# Patient Record
Sex: FEMALE | Race: WHITE | NOT HISPANIC OR LATINO | Employment: UNEMPLOYED | URBAN - METROPOLITAN AREA
[De-identification: names, ages, dates, MRNs, and addresses within clinical notes are randomized per-mention and may not be internally consistent; named-entity substitution may affect disease eponyms.]

---

## 2018-02-09 ENCOUNTER — HOSPITAL ENCOUNTER (EMERGENCY)
Facility: HOSPITAL | Age: 28
Discharge: HOME | End: 2018-02-10
Attending: EMERGENCY MEDICINE | Admitting: EMERGENCY MEDICINE

## 2018-02-09 ENCOUNTER — APPOINTMENT (OUTPATIENT)
Dept: GENERAL RADIOLOGY | Facility: HOSPITAL | Age: 28
End: 2018-02-09

## 2018-02-09 DIAGNOSIS — R11.2 NAUSEA AND VOMITING IN ADULT: Primary | ICD-10-CM

## 2018-02-09 DIAGNOSIS — R53.83 FATIGUE, UNSPECIFIED TYPE: ICD-10-CM

## 2018-02-09 PROCEDURE — 80164 ASSAY DIPROPYLACETIC ACD TOT: CPT | Performed by: EMERGENCY MEDICINE

## 2018-02-09 PROCEDURE — 99284 EMERGENCY DEPT VISIT MOD MDM: CPT

## 2018-02-09 PROCEDURE — 85025 COMPLETE CBC W/AUTO DIFF WBC: CPT | Performed by: EMERGENCY MEDICINE

## 2018-02-09 PROCEDURE — 25010000002 ONDANSETRON PER 1 MG: Performed by: EMERGENCY MEDICINE

## 2018-02-09 PROCEDURE — 96374 THER/PROPH/DIAG INJ IV PUSH: CPT

## 2018-02-09 PROCEDURE — 71045 X-RAY EXAM CHEST 1 VIEW: CPT

## 2018-02-09 PROCEDURE — 96361 HYDRATE IV INFUSION ADD-ON: CPT

## 2018-02-09 PROCEDURE — 80307 DRUG TEST PRSMV CHEM ANLYZR: CPT | Performed by: EMERGENCY MEDICINE

## 2018-02-09 PROCEDURE — 80053 COMPREHEN METABOLIC PANEL: CPT | Performed by: EMERGENCY MEDICINE

## 2018-02-09 RX ORDER — NALTREXONE HYDROCHLORIDE 50 MG/1
50 TABLET, FILM COATED ORAL DAILY
COMMUNITY
End: 2018-05-01 | Stop reason: HOSPADM

## 2018-02-09 RX ORDER — ONDANSETRON 2 MG/ML
4 INJECTION INTRAMUSCULAR; INTRAVENOUS ONCE
Status: COMPLETED | OUTPATIENT
Start: 2018-02-09 | End: 2018-02-09

## 2018-02-09 RX ORDER — SODIUM CHLORIDE 0.9 % (FLUSH) 0.9 %
10 SYRINGE (ML) INJECTION AS NEEDED
Status: DISCONTINUED | OUTPATIENT
Start: 2018-02-09 | End: 2018-02-10 | Stop reason: HOSPADM

## 2018-02-09 RX ORDER — HALOPERIDOL 5 MG/1
5 TABLET ORAL DAILY PRN
COMMUNITY

## 2018-02-09 RX ORDER — MONTELUKAST SODIUM 10 MG/1
10 TABLET ORAL NIGHTLY
COMMUNITY

## 2018-02-09 RX ADMIN — SODIUM CHLORIDE 1000 ML: 9 INJECTION, SOLUTION INTRAVENOUS at 22:34

## 2018-02-09 RX ADMIN — ONDANSETRON 4 MG: 2 INJECTION INTRAMUSCULAR; INTRAVENOUS at 22:34

## 2018-02-10 VITALS
DIASTOLIC BLOOD PRESSURE: 69 MMHG | TEMPERATURE: 98 F | RESPIRATION RATE: 16 BRPM | HEART RATE: 84 BPM | SYSTOLIC BLOOD PRESSURE: 105 MMHG | HEIGHT: 61 IN | BODY MASS INDEX: 36.25 KG/M2 | OXYGEN SATURATION: 98 % | WEIGHT: 192 LBS

## 2018-02-10 LAB
ALBUMIN SERPL-MCNC: 3.6 G/DL (ref 3.2–4.8)
ALBUMIN/GLOB SERPL: 1.5 G/DL (ref 1.5–2.5)
ALP SERPL-CCNC: 92 U/L (ref 25–100)
ALT SERPL W P-5'-P-CCNC: 21 U/L (ref 7–40)
AMPHET+METHAMPHET UR QL: NEGATIVE
AMPHETAMINES UR QL: NEGATIVE
ANION GAP SERPL CALCULATED.3IONS-SCNC: 8 MMOL/L (ref 3–11)
AST SERPL-CCNC: 24 U/L (ref 0–33)
BARBITURATES UR QL SCN: NEGATIVE
BASOPHILS # BLD AUTO: 0.03 10*3/MM3 (ref 0–0.2)
BASOPHILS NFR BLD AUTO: 0.2 % (ref 0–1)
BENZODIAZ UR QL SCN: NEGATIVE
BILIRUB SERPL-MCNC: 0.2 MG/DL (ref 0.3–1.2)
BILIRUB UR QL STRIP: NEGATIVE
BUN BLD-MCNC: 12 MG/DL (ref 9–23)
BUN/CREAT SERPL: 13.3 (ref 7–25)
BUPRENORPHINE SERPL-MCNC: NEGATIVE NG/ML
CALCIUM SPEC-SCNC: 8.5 MG/DL (ref 8.7–10.4)
CANNABINOIDS SERPL QL: NEGATIVE
CHLORIDE SERPL-SCNC: 106 MMOL/L (ref 99–109)
CLARITY UR: CLEAR
CO2 SERPL-SCNC: 22 MMOL/L (ref 20–31)
COCAINE UR QL: NEGATIVE
COLOR UR: YELLOW
CREAT BLD-MCNC: 0.9 MG/DL (ref 0.6–1.3)
DEPRECATED RDW RBC AUTO: 50.5 FL (ref 37–54)
EOSINOPHIL # BLD AUTO: 0.05 10*3/MM3 (ref 0–0.3)
EOSINOPHIL NFR BLD AUTO: 0.4 % (ref 0–3)
ERYTHROCYTE [DISTWIDTH] IN BLOOD BY AUTOMATED COUNT: 17.5 % (ref 11.3–14.5)
ETHANOL BLD-MCNC: <10 MG/DL (ref 0–10)
GFR SERPL CREATININE-BSD FRML MDRD: 76 ML/MIN/1.73
GLOBULIN UR ELPH-MCNC: 2.4 GM/DL
GLUCOSE BLD-MCNC: 99 MG/DL (ref 70–100)
GLUCOSE UR STRIP-MCNC: NEGATIVE MG/DL
HCT VFR BLD AUTO: 33.4 % (ref 34.5–44)
HGB BLD-MCNC: 10.4 G/DL (ref 11.5–15.5)
HGB UR QL STRIP.AUTO: NEGATIVE
IMM GRANULOCYTES # BLD: 0.12 10*3/MM3 (ref 0–0.03)
IMM GRANULOCYTES NFR BLD: 1 % (ref 0–0.6)
KETONES UR QL STRIP: NEGATIVE
LEUKOCYTE ESTERASE UR QL STRIP.AUTO: NEGATIVE
LYMPHOCYTES # BLD AUTO: 2.08 10*3/MM3 (ref 0.6–4.8)
LYMPHOCYTES NFR BLD AUTO: 17.3 % (ref 24–44)
MCH RBC QN AUTO: 25 PG (ref 27–31)
MCHC RBC AUTO-ENTMCNC: 31.1 G/DL (ref 32–36)
MCV RBC AUTO: 80.3 FL (ref 80–99)
METHADONE UR QL SCN: NEGATIVE
MONOCYTES # BLD AUTO: 1.7 10*3/MM3 (ref 0–1)
MONOCYTES NFR BLD AUTO: 14.2 % (ref 0–12)
NEUTROPHILS # BLD AUTO: 8.03 10*3/MM3 (ref 1.5–8.3)
NEUTROPHILS NFR BLD AUTO: 66.9 % (ref 41–71)
NITRITE UR QL STRIP: NEGATIVE
OPIATES UR QL: NEGATIVE
OXYCODONE UR QL SCN: NEGATIVE
PCP UR QL SCN: NEGATIVE
PH UR STRIP.AUTO: 5.5 [PH] (ref 5–8)
PLATELET # BLD AUTO: 311 10*3/MM3 (ref 150–450)
PMV BLD AUTO: 9.8 FL (ref 6–12)
POTASSIUM BLD-SCNC: 4.3 MMOL/L (ref 3.5–5.5)
PROPOXYPH UR QL: NEGATIVE
PROT SERPL-MCNC: 6 G/DL (ref 5.7–8.2)
PROT UR QL STRIP: NEGATIVE
RBC # BLD AUTO: 4.16 10*6/MM3 (ref 3.89–5.14)
SODIUM BLD-SCNC: 136 MMOL/L (ref 132–146)
SP GR UR STRIP: 1.01 (ref 1–1.03)
TRICYCLICS UR QL SCN: POSITIVE
UROBILINOGEN UR QL STRIP: NORMAL
VALPROATE SERPL-MCNC: 41 MCG/ML (ref 50–150)
WBC NRBC COR # BLD: 12.01 10*3/MM3 (ref 3.5–10.8)

## 2018-02-10 PROCEDURE — 80306 DRUG TEST PRSMV INSTRMNT: CPT | Performed by: EMERGENCY MEDICINE

## 2018-02-10 PROCEDURE — 81003 URINALYSIS AUTO W/O SCOPE: CPT | Performed by: EMERGENCY MEDICINE

## 2018-02-10 NOTE — ED PROVIDER NOTES
Subjective   HPI Comments: Ms. Fartun Amin is a 27 y.o. female who presents to the ED with c/o vomiting. She reports that she was feeling good this morning and then went out to [facility] with a staff member and 3 other residents of her group home. However, while she was out she states that she developed vomiting as well as weakness, which she reports has been unchanged since onset. She also complains of a decreased appetite. Her caregiver called to tell us she started a new medication a week ago while at [facility] for a behavioral issue. No other acute complaints at this time.    Patient is a 27 y.o. female presenting with vomiting.   History provided by:  Patient and caregiver  Vomiting   The primary symptoms include vomiting. The illness began today. The onset was sudden. The problem has not changed since onset.  The vomiting began today.       Review of Systems   Constitutional: Positive for appetite change (Decreased appetite).   Gastrointestinal: Positive for vomiting.   Neurological: Positive for weakness.   All other systems reviewed and are negative.      Past Medical History:   Diagnosis Date   • ADHD (attention deficit hyperactivity disorder)    • Adjustment disorder    • Asthma    • GERD (gastroesophageal reflux disease)    • Hearing impairment    • Hypertension    • Intermittent explosive disorder    • Mood disorder    • Obesity    • PCOS (polycystic ovarian syndrome)    • Personality disorder    • PTSD (post-traumatic stress disorder)        Allergies   Allergen Reactions   • Penicillins    • Sulfa Antibiotics        History reviewed. No pertinent surgical history.    History reviewed. No pertinent family history.    Social History     Social History   • Marital status: Single     Spouse name: N/A   • Number of children: N/A   • Years of education: N/A     Social History Main Topics   • Smoking status: Never Smoker   • Smokeless tobacco: Never Used   • Alcohol use No   • Drug use: No   • Sexual  activity: Defer     Other Topics Concern   • None     Social History Narrative    Lives in a group home         Objective   Physical Exam   Constitutional: She is oriented to person, place, and time. She appears well-developed and well-nourished. No distress.   Patient is awake but somewhat somnolent appearing.   HENT:   Head: Normocephalic and atraumatic.   Nose: Nose normal.   Eyes: Conjunctivae are normal. No scleral icterus.   Neck: Normal range of motion. Neck supple.   Cardiovascular: Normal rate, regular rhythm and normal heart sounds.    No murmur heard.  Pulmonary/Chest: Effort normal and breath sounds normal. No respiratory distress.   Abdominal: Soft. There is no tenderness.   Musculoskeletal: Normal range of motion.   Patient has no significant edema or abnormalities to her lower extremities.    Neurological: She is alert and oriented to person, place, and time.   Skin: Skin is warm and dry. She is not diaphoretic. No erythema.   Patient has a wound in her antecubital fossa, which appears to be an old scar. There is no surrounding erythema or purulent drainage.   Psychiatric: She has a normal mood and affect. Her behavior is normal. Her speech is slurred.   Patient has slowed and slightly slurred speech.   Nursing note and vitals reviewed.      Procedures         ED Course  ED Course                     MDM  Number of Diagnoses or Management Options  Fatigue, unspecified type: new and requires workup  Nausea and vomiting in adult: new and requires workup  Diagnosis management comments: No acute abnormalities on labs or imaging.     Patient has stable vitals, and continues to appear well, nontoxic, in no distress.        Amount and/or Complexity of Data Reviewed  Clinical lab tests: ordered and reviewed  Tests in the radiology section of CPT®: ordered and reviewed  Decide to obtain previous medical records or to obtain history from someone other than the patient: yes  Obtain history from someone other  than the patient: yes  Review and summarize past medical records: yes  Independent visualization of images, tracings, or specimens: yes    Patient Progress  Patient progress: stable      Final diagnoses:   Nausea and vomiting in adult   Fatigue, unspecified type       Documentation assistance provided by cinthia Palafox.  Information recorded by the scribe was done at my direction and has been verified and validated by me.     Emerald Palafox  02/09/18 2227       Marquis Anne MD  02/10/18 012

## 2018-04-23 ENCOUNTER — APPOINTMENT (OUTPATIENT)
Dept: GENERAL RADIOLOGY | Facility: HOSPITAL | Age: 28
End: 2018-04-23

## 2018-04-23 ENCOUNTER — HOSPITAL ENCOUNTER (INPATIENT)
Facility: HOSPITAL | Age: 28
LOS: 8 days | End: 2018-05-01
Attending: EMERGENCY MEDICINE | Admitting: FAMILY MEDICINE

## 2018-04-23 ENCOUNTER — APPOINTMENT (OUTPATIENT)
Dept: CT IMAGING | Facility: HOSPITAL | Age: 28
End: 2018-04-23

## 2018-04-23 DIAGNOSIS — E86.0 DEHYDRATION: ICD-10-CM

## 2018-04-23 DIAGNOSIS — N39.0 ACUTE UTI: Primary | ICD-10-CM

## 2018-04-23 DIAGNOSIS — Z74.09 IMPAIRED MOBILITY AND ADLS: ICD-10-CM

## 2018-04-23 DIAGNOSIS — N17.9 AKI (ACUTE KIDNEY INJURY) (HCC): ICD-10-CM

## 2018-04-23 DIAGNOSIS — Z78.9 IMPAIRED MOBILITY AND ADLS: ICD-10-CM

## 2018-04-23 DIAGNOSIS — R41.82 ALTERED MENTAL STATUS, UNSPECIFIED ALTERED MENTAL STATUS TYPE: ICD-10-CM

## 2018-04-23 DIAGNOSIS — K52.9 ACUTE COLITIS: ICD-10-CM

## 2018-04-23 DIAGNOSIS — Z74.09 IMPAIRED FUNCTIONAL MOBILITY, BALANCE, GAIT, AND ENDURANCE: ICD-10-CM

## 2018-04-23 PROBLEM — E28.2 PCOS (POLYCYSTIC OVARIAN SYNDROME): Chronic | Status: ACTIVE | Noted: 2018-04-23

## 2018-04-23 PROBLEM — I10 HTN (HYPERTENSION): Chronic | Status: ACTIVE | Noted: 2018-04-23

## 2018-04-23 PROBLEM — E87.5 HYPERKALEMIA: Status: ACTIVE | Noted: 2018-04-23

## 2018-04-23 PROBLEM — R94.31 QT PROLONGATION: Status: ACTIVE | Noted: 2018-04-23

## 2018-04-23 PROBLEM — F43.10 PTSD (POST-TRAUMATIC STRESS DISORDER): Chronic | Status: ACTIVE | Noted: 2018-04-23

## 2018-04-23 PROBLEM — J45.909 ASTHMA: Chronic | Status: ACTIVE | Noted: 2018-04-23

## 2018-04-23 PROBLEM — T50.902A SUICIDE ATTEMPT BY DRUG INGESTION (HCC): Status: ACTIVE | Noted: 2018-04-23

## 2018-04-23 PROBLEM — K21.9 GERD (GASTROESOPHAGEAL REFLUX DISEASE): Chronic | Status: ACTIVE | Noted: 2018-04-23

## 2018-04-23 PROBLEM — F63.81 INTERMITTENT EXPLOSIVE DISORDER: Chronic | Status: ACTIVE | Noted: 2018-04-23

## 2018-04-23 PROBLEM — A41.9 SEPSIS (HCC): Status: ACTIVE | Noted: 2018-04-23

## 2018-04-23 PROBLEM — F60.9 PERSONALITY DISORDER (HCC): Chronic | Status: ACTIVE | Noted: 2018-04-23

## 2018-04-23 PROBLEM — F43.20 ADJUSTMENT DISORDER: Chronic | Status: ACTIVE | Noted: 2018-04-23

## 2018-04-23 PROBLEM — R89.2 ABNORMAL DRUG SCREEN: Status: ACTIVE | Noted: 2018-04-23

## 2018-04-23 LAB
ALBUMIN SERPL-MCNC: 3.5 G/DL (ref 3.2–4.8)
ALBUMIN/GLOB SERPL: 1.3 G/DL (ref 1.5–2.5)
ALP SERPL-CCNC: 69 U/L (ref 25–100)
ALT SERPL W P-5'-P-CCNC: 123 U/L (ref 7–40)
AMORPH URATE CRY URNS QL MICRO: ABNORMAL /HPF
AMPHET+METHAMPHET UR QL: NEGATIVE
AMPHETAMINES UR QL: NEGATIVE
ANION GAP SERPL CALCULATED.3IONS-SCNC: 10 MMOL/L (ref 3–11)
APAP SERPL-MCNC: <10 MCG/ML (ref 0–30)
AST SERPL-CCNC: 116 U/L (ref 0–33)
B-HCG UR QL: NEGATIVE
BACTERIA UR QL AUTO: ABNORMAL /HPF
BARBITURATES UR QL SCN: NEGATIVE
BASOPHILS # BLD AUTO: 0.03 10*3/MM3 (ref 0–0.2)
BASOPHILS NFR BLD AUTO: 0.2 % (ref 0–1)
BENZODIAZ UR QL SCN: NEGATIVE
BILIRUB SERPL-MCNC: 0.1 MG/DL (ref 0.3–1.2)
BILIRUB UR QL STRIP: NEGATIVE
BNP SERPL-MCNC: 14 PG/ML (ref 0–100)
BUN BLD-MCNC: 26 MG/DL (ref 9–23)
BUN/CREAT SERPL: 7.6 (ref 7–25)
BUPRENORPHINE SERPL-MCNC: NEGATIVE NG/ML
CALCIUM SPEC-SCNC: 8.9 MG/DL (ref 8.7–10.4)
CANNABINOIDS SERPL QL: NEGATIVE
CHLORIDE SERPL-SCNC: 103 MMOL/L (ref 99–109)
CLARITY UR: ABNORMAL
CO2 SERPL-SCNC: 23 MMOL/L (ref 20–31)
COCAINE UR QL: NEGATIVE
COLOR UR: ABNORMAL
CREAT BLD-MCNC: 3.4 MG/DL (ref 0.6–1.3)
CREAT UR-MCNC: 190 MG/DL
D-LACTATE SERPL-SCNC: 1.3 MMOL/L (ref 0.5–2)
D-LACTATE SERPL-SCNC: 1.4 MMOL/L (ref 0.5–2)
DEPRECATED RDW RBC AUTO: 51.2 FL (ref 37–54)
EOSINOPHIL # BLD AUTO: 0.03 10*3/MM3 (ref 0–0.3)
EOSINOPHIL NFR BLD AUTO: 0.2 % (ref 0–3)
ERYTHROCYTE [DISTWIDTH] IN BLOOD BY AUTOMATED COUNT: 16.7 % (ref 11.3–14.5)
ETHANOL BLD-MCNC: <10 MG/DL (ref 0–10)
GFR SERPL CREATININE-BSD FRML MDRD: 16 ML/MIN/1.73
GLOBULIN UR ELPH-MCNC: 2.6 GM/DL
GLUCOSE BLD-MCNC: 107 MG/DL (ref 70–100)
GLUCOSE BLDC GLUCOMTR-MCNC: 111 MG/DL (ref 70–130)
GLUCOSE UR STRIP-MCNC: NEGATIVE MG/DL
HCT VFR BLD AUTO: 35 % (ref 34.5–44)
HGB BLD-MCNC: 11.4 G/DL (ref 11.5–15.5)
HGB UR QL STRIP.AUTO: ABNORMAL
HOLD SPECIMEN: NORMAL
HOLD SPECIMEN: NORMAL
HYALINE CASTS UR QL AUTO: ABNORMAL /LPF
IMM GRANULOCYTES # BLD: 0.31 10*3/MM3 (ref 0–0.03)
IMM GRANULOCYTES NFR BLD: 2.5 % (ref 0–0.6)
KETONES UR QL STRIP: ABNORMAL
LEUKOCYTE ESTERASE UR QL STRIP.AUTO: ABNORMAL
LIPASE SERPL-CCNC: 24 U/L (ref 6–51)
LYMPHOCYTES # BLD AUTO: 1.91 10*3/MM3 (ref 0.6–4.8)
LYMPHOCYTES NFR BLD AUTO: 15.3 % (ref 24–44)
MAGNESIUM SERPL-MCNC: 2.4 MG/DL (ref 1.3–2.7)
MCH RBC QN AUTO: 27.1 PG (ref 27–31)
MCHC RBC AUTO-ENTMCNC: 32.6 G/DL (ref 32–36)
MCV RBC AUTO: 83.3 FL (ref 80–99)
METHADONE UR QL SCN: NEGATIVE
MONOCYTES # BLD AUTO: 2.39 10*3/MM3 (ref 0–1)
MONOCYTES NFR BLD AUTO: 19.1 % (ref 0–12)
NEUTROPHILS # BLD AUTO: 7.84 10*3/MM3 (ref 1.5–8.3)
NEUTROPHILS NFR BLD AUTO: 62.7 % (ref 41–71)
NITRITE UR QL STRIP: NEGATIVE
OPIATES UR QL: POSITIVE
OXYCODONE UR QL SCN: NEGATIVE
PCP UR QL SCN: NEGATIVE
PH UR STRIP.AUTO: <=5 [PH] (ref 5–8)
PLATELET # BLD AUTO: 284 10*3/MM3 (ref 150–450)
PMV BLD AUTO: 9.6 FL (ref 6–12)
POTASSIUM BLD-SCNC: 6 MMOL/L (ref 3.5–5.5)
PROCALCITONIN SERPL-MCNC: 0.81 NG/ML
PROPOXYPH UR QL: POSITIVE
PROT SERPL-MCNC: 6.1 G/DL (ref 5.7–8.2)
PROT UR QL STRIP: ABNORMAL
RBC # BLD AUTO: 4.2 10*6/MM3 (ref 3.89–5.14)
RBC # UR: ABNORMAL /HPF
REF LAB TEST METHOD: ABNORMAL
SODIUM BLD-SCNC: 136 MMOL/L (ref 132–146)
SODIUM UR-SCNC: 51 MMOL/L (ref 30–90)
SP GR UR STRIP: 1.02 (ref 1–1.03)
SQUAMOUS #/AREA URNS HPF: ABNORMAL /HPF
TRICYCLICS UR QL SCN: POSITIVE
TROPONIN I SERPL-MCNC: 0.01 NG/ML (ref 0–0.07)
UROBILINOGEN UR QL STRIP: ABNORMAL
VALPROATE SERPL-MCNC: 89 MCG/ML (ref 50–150)
WBC NRBC COR # BLD: 12.51 10*3/MM3 (ref 3.5–10.8)
WBC UR QL AUTO: ABNORMAL /HPF
WHOLE BLOOD HOLD SPECIMEN: NORMAL
WHOLE BLOOD HOLD SPECIMEN: NORMAL

## 2018-04-23 PROCEDURE — 82962 GLUCOSE BLOOD TEST: CPT

## 2018-04-23 PROCEDURE — 81025 URINE PREGNANCY TEST: CPT | Performed by: EMERGENCY MEDICINE

## 2018-04-23 PROCEDURE — G0378 HOSPITAL OBSERVATION PER HR: HCPCS

## 2018-04-23 PROCEDURE — 83690 ASSAY OF LIPASE: CPT | Performed by: NURSE PRACTITIONER

## 2018-04-23 PROCEDURE — 82550 ASSAY OF CK (CPK): CPT | Performed by: NURSE PRACTITIONER

## 2018-04-23 PROCEDURE — 83880 ASSAY OF NATRIURETIC PEPTIDE: CPT | Performed by: NURSE PRACTITIONER

## 2018-04-23 PROCEDURE — 83735 ASSAY OF MAGNESIUM: CPT | Performed by: EMERGENCY MEDICINE

## 2018-04-23 PROCEDURE — 84484 ASSAY OF TROPONIN QUANT: CPT | Performed by: NURSE PRACTITIONER

## 2018-04-23 PROCEDURE — 84145 PROCALCITONIN (PCT): CPT | Performed by: EMERGENCY MEDICINE

## 2018-04-23 PROCEDURE — 80307 DRUG TEST PRSMV CHEM ANLYZR: CPT | Performed by: EMERGENCY MEDICINE

## 2018-04-23 PROCEDURE — 80307 DRUG TEST PRSMV CHEM ANLYZR: CPT | Performed by: NURSE PRACTITIONER

## 2018-04-23 PROCEDURE — P9612 CATHETERIZE FOR URINE SPEC: HCPCS

## 2018-04-23 PROCEDURE — 71045 X-RAY EXAM CHEST 1 VIEW: CPT

## 2018-04-23 PROCEDURE — 87086 URINE CULTURE/COLONY COUNT: CPT | Performed by: HOSPITALIST

## 2018-04-23 PROCEDURE — 93005 ELECTROCARDIOGRAM TRACING: CPT

## 2018-04-23 PROCEDURE — 83605 ASSAY OF LACTIC ACID: CPT | Performed by: NURSE PRACTITIONER

## 2018-04-23 PROCEDURE — 74176 CT ABD & PELVIS W/O CONTRAST: CPT

## 2018-04-23 PROCEDURE — 87186 SC STD MICRODIL/AGAR DIL: CPT | Performed by: HOSPITALIST

## 2018-04-23 PROCEDURE — 83605 ASSAY OF LACTIC ACID: CPT | Performed by: EMERGENCY MEDICINE

## 2018-04-23 PROCEDURE — 87077 CULTURE AEROBIC IDENTIFY: CPT | Performed by: HOSPITALIST

## 2018-04-23 PROCEDURE — 99285 EMERGENCY DEPT VISIT HI MDM: CPT

## 2018-04-23 PROCEDURE — 93005 ELECTROCARDIOGRAM TRACING: CPT | Performed by: EMERGENCY MEDICINE

## 2018-04-23 PROCEDURE — 87040 BLOOD CULTURE FOR BACTERIA: CPT | Performed by: EMERGENCY MEDICINE

## 2018-04-23 PROCEDURE — 80164 ASSAY DIPROPYLACETIC ACD TOT: CPT | Performed by: EMERGENCY MEDICINE

## 2018-04-23 PROCEDURE — 84300 ASSAY OF URINE SODIUM: CPT | Performed by: NURSE PRACTITIONER

## 2018-04-23 PROCEDURE — 99223 1ST HOSP IP/OBS HIGH 75: CPT | Performed by: FAMILY MEDICINE

## 2018-04-23 PROCEDURE — 81001 URINALYSIS AUTO W/SCOPE: CPT | Performed by: EMERGENCY MEDICINE

## 2018-04-23 PROCEDURE — 25010000002 MEROPENEM: Performed by: EMERGENCY MEDICINE

## 2018-04-23 PROCEDURE — 84484 ASSAY OF TROPONIN QUANT: CPT

## 2018-04-23 PROCEDURE — 85025 COMPLETE CBC W/AUTO DIFF WBC: CPT | Performed by: EMERGENCY MEDICINE

## 2018-04-23 PROCEDURE — 82570 ASSAY OF URINE CREATININE: CPT | Performed by: NURSE PRACTITIONER

## 2018-04-23 PROCEDURE — 70450 CT HEAD/BRAIN W/O DYE: CPT

## 2018-04-23 PROCEDURE — 25010000002 VANCOMYCIN: Performed by: EMERGENCY MEDICINE

## 2018-04-23 PROCEDURE — 80306 DRUG TEST PRSMV INSTRMNT: CPT | Performed by: EMERGENCY MEDICINE

## 2018-04-23 PROCEDURE — 82140 ASSAY OF AMMONIA: CPT | Performed by: NURSE PRACTITIONER

## 2018-04-23 PROCEDURE — 80053 COMPREHEN METABOLIC PANEL: CPT | Performed by: EMERGENCY MEDICINE

## 2018-04-23 RX ORDER — DIVALPROEX SODIUM 500 MG/1
500 TABLET, EXTENDED RELEASE ORAL DAILY
Status: DISCONTINUED | OUTPATIENT
Start: 2018-04-24 | End: 2018-05-01 | Stop reason: HOSPADM

## 2018-04-23 RX ORDER — BISACODYL 10 MG
10 SUPPOSITORY, RECTAL RECTAL ONCE
Status: DISCONTINUED | OUTPATIENT
Start: 2018-04-23 | End: 2018-04-24

## 2018-04-23 RX ORDER — ATORVASTATIN CALCIUM 20 MG/1
20 TABLET, FILM COATED ORAL NIGHTLY
COMMUNITY

## 2018-04-23 RX ORDER — MONTELUKAST SODIUM 10 MG/1
10 TABLET ORAL NIGHTLY
Status: DISCONTINUED | OUTPATIENT
Start: 2018-04-23 | End: 2018-05-01 | Stop reason: HOSPADM

## 2018-04-23 RX ORDER — FOLIC ACID 1 MG/1
1 TABLET ORAL DAILY
Status: DISCONTINUED | OUTPATIENT
Start: 2018-04-24 | End: 2018-05-01 | Stop reason: HOSPADM

## 2018-04-23 RX ORDER — ESCITALOPRAM OXALATE 10 MG/1
10 TABLET ORAL EVERY MORNING
COMMUNITY

## 2018-04-23 RX ORDER — ESCITALOPRAM OXALATE 10 MG/1
10 TABLET ORAL EVERY MORNING
Status: DISCONTINUED | OUTPATIENT
Start: 2018-04-24 | End: 2018-05-01 | Stop reason: HOSPADM

## 2018-04-23 RX ORDER — DIVALPROEX SODIUM 500 MG/1
1000 TABLET, EXTENDED RELEASE ORAL NIGHTLY
Status: DISCONTINUED | OUTPATIENT
Start: 2018-04-24 | End: 2018-05-01 | Stop reason: HOSPADM

## 2018-04-23 RX ORDER — FLUTICASONE PROPIONATE 50 MCG
2 SPRAY, SUSPENSION (ML) NASAL DAILY
Status: DISCONTINUED | OUTPATIENT
Start: 2018-04-24 | End: 2018-05-01 | Stop reason: HOSPADM

## 2018-04-23 RX ORDER — ACETAMINOPHEN 325 MG/1
650 TABLET ORAL EVERY 6 HOURS PRN
Status: DISCONTINUED | OUTPATIENT
Start: 2018-04-23 | End: 2018-05-01 | Stop reason: HOSPADM

## 2018-04-23 RX ORDER — BISACODYL 10 MG
10 SUPPOSITORY, RECTAL RECTAL DAILY PRN
Status: DISCONTINUED | OUTPATIENT
Start: 2018-04-23 | End: 2018-05-01 | Stop reason: HOSPADM

## 2018-04-23 RX ORDER — RANITIDINE 150 MG/1
150 TABLET ORAL NIGHTLY
COMMUNITY

## 2018-04-23 RX ORDER — BENZTROPINE MESYLATE 1 MG/1
2 TABLET ORAL EVERY 12 HOURS SCHEDULED
Status: DISCONTINUED | OUTPATIENT
Start: 2018-04-23 | End: 2018-05-01 | Stop reason: HOSPADM

## 2018-04-23 RX ORDER — DOCUSATE SODIUM 100 MG/1
100 CAPSULE, LIQUID FILLED ORAL 2 TIMES DAILY
Status: DISCONTINUED | OUTPATIENT
Start: 2018-04-24 | End: 2018-04-30

## 2018-04-23 RX ORDER — CETIRIZINE HYDROCHLORIDE 10 MG/1
10 TABLET ORAL NIGHTLY
Status: DISCONTINUED | OUTPATIENT
Start: 2018-04-23 | End: 2018-05-01 | Stop reason: HOSPADM

## 2018-04-23 RX ORDER — FAMOTIDINE 20 MG/1
20 TABLET, FILM COATED ORAL NIGHTLY
Status: DISCONTINUED | OUTPATIENT
Start: 2018-04-23 | End: 2018-05-01 | Stop reason: HOSPADM

## 2018-04-23 RX ORDER — ACETAMINOPHEN 650 MG/1
650 SUPPOSITORY RECTAL 2 TIMES DAILY PRN
Status: DISCONTINUED | OUTPATIENT
Start: 2018-04-23 | End: 2018-05-01 | Stop reason: HOSPADM

## 2018-04-23 RX ORDER — PANTOPRAZOLE SODIUM 40 MG/1
40 TABLET, DELAYED RELEASE ORAL DAILY
Status: DISCONTINUED | OUTPATIENT
Start: 2018-04-24 | End: 2018-05-01 | Stop reason: HOSPADM

## 2018-04-23 RX ORDER — SODIUM CHLORIDE 0.9 % (FLUSH) 0.9 %
10 SYRINGE (ML) INJECTION AS NEEDED
Status: DISCONTINUED | OUTPATIENT
Start: 2018-04-23 | End: 2018-05-01 | Stop reason: HOSPADM

## 2018-04-23 RX ORDER — HEPARIN SODIUM 5000 [USP'U]/ML
5000 INJECTION, SOLUTION INTRAVENOUS; SUBCUTANEOUS EVERY 12 HOURS SCHEDULED
Status: DISCONTINUED | OUTPATIENT
Start: 2018-04-24 | End: 2018-05-01 | Stop reason: HOSPADM

## 2018-04-23 RX ORDER — SODIUM CHLORIDE 9 MG/ML
125 INJECTION, SOLUTION INTRAVENOUS CONTINUOUS
Status: DISCONTINUED | OUTPATIENT
Start: 2018-04-23 | End: 2018-04-27

## 2018-04-23 RX ORDER — SODIUM CHLORIDE 0.9 % (FLUSH) 0.9 %
1-10 SYRINGE (ML) INJECTION AS NEEDED
Status: DISCONTINUED | OUTPATIENT
Start: 2018-04-23 | End: 2018-05-01 | Stop reason: HOSPADM

## 2018-04-23 RX ORDER — PROMETHAZINE HYDROCHLORIDE 12.5 MG/1
12.5 SUPPOSITORY RECTAL EVERY 6 HOURS PRN
Status: DISCONTINUED | OUTPATIENT
Start: 2018-04-23 | End: 2018-05-01 | Stop reason: HOSPADM

## 2018-04-23 RX ADMIN — CETIRIZINE HYDROCHLORIDE 10 MG: 10 TABLET, FILM COATED ORAL at 22:36

## 2018-04-23 RX ADMIN — SODIUM CHLORIDE 100 ML/HR: 9 INJECTION, SOLUTION INTRAVENOUS at 22:37

## 2018-04-23 RX ADMIN — FAMOTIDINE 20 MG: 20 TABLET, FILM COATED ORAL at 22:36

## 2018-04-23 RX ADMIN — BENZTROPINE MESYLATE 2 MG: 1 TABLET ORAL at 22:36

## 2018-04-23 RX ADMIN — MEROPENEM 1 G: 1 INJECTION, POWDER, FOR SOLUTION INTRAVENOUS at 18:55

## 2018-04-23 RX ADMIN — MONTELUKAST SODIUM 10 MG: 10 TABLET, FILM COATED ORAL at 22:36

## 2018-04-23 RX ADMIN — VANCOMYCIN HYDROCHLORIDE 1750 MG: 10 INJECTION, POWDER, LYOPHILIZED, FOR SOLUTION INTRAVENOUS at 19:37

## 2018-04-23 RX ADMIN — SODIUM CHLORIDE 2000 ML: 9 INJECTION, SOLUTION INTRAVENOUS at 17:41

## 2018-04-23 RX ADMIN — ACETAMINOPHEN 650 MG: 325 TABLET ORAL at 23:08

## 2018-04-24 ENCOUNTER — APPOINTMENT (OUTPATIENT)
Dept: NEUROLOGY | Facility: HOSPITAL | Age: 28
End: 2018-04-24
Attending: FAMILY MEDICINE

## 2018-04-24 ENCOUNTER — APPOINTMENT (OUTPATIENT)
Dept: CARDIOLOGY | Facility: HOSPITAL | Age: 28
End: 2018-04-24
Attending: FAMILY MEDICINE

## 2018-04-24 PROBLEM — M62.82 NON-TRAUMATIC RHABDOMYOLYSIS: Status: ACTIVE | Noted: 2018-04-24

## 2018-04-24 LAB
ALBUMIN SERPL-MCNC: 2.9 G/DL (ref 3.2–4.8)
ALBUMIN/GLOB SERPL: 1.3 G/DL (ref 1.5–2.5)
ALP SERPL-CCNC: 52 U/L (ref 25–100)
ALT SERPL W P-5'-P-CCNC: 137 U/L (ref 7–40)
AMMONIA BLD-SCNC: <10 UMOL/L (ref 19–60)
ANION GAP SERPL CALCULATED.3IONS-SCNC: 6 MMOL/L (ref 3–11)
ANION GAP SERPL CALCULATED.3IONS-SCNC: 9 MMOL/L (ref 3–11)
APTT PPP: 25.6 SECONDS (ref 24–31)
ARTICHOKE IGE QN: 38 MG/DL (ref 0–130)
AST SERPL-CCNC: 244 U/L (ref 0–33)
BASOPHILS # BLD AUTO: 0.02 10*3/MM3 (ref 0–0.2)
BASOPHILS NFR BLD AUTO: 0.2 % (ref 0–1)
BH CV ECHO MEAS - BSA(HAYCOCK): 2.3 M^2
BH CV ECHO MEAS - BSA: 2.1 M^2
BH CV ECHO MEAS - BZI_BMI: 42.9 KILOGRAMS/M^2
BH CV ECHO MEAS - BZI_METRIC_HEIGHT: 160 CM
BH CV ECHO MEAS - BZI_METRIC_WEIGHT: 109.8 KG
BH CV LOWER VASCULAR LEFT COMMON FEMORAL AUGMENT: NORMAL
BH CV LOWER VASCULAR LEFT COMMON FEMORAL COMPRESS: NORMAL
BH CV LOWER VASCULAR LEFT COMMON FEMORAL PHASIC: NORMAL
BH CV LOWER VASCULAR LEFT COMMON FEMORAL SPONT: NORMAL
BH CV LOWER VASCULAR RIGHT COMMON FEMORAL AUGMENT: NORMAL
BH CV LOWER VASCULAR RIGHT COMMON FEMORAL COMPRESS: NORMAL
BH CV LOWER VASCULAR RIGHT COMMON FEMORAL PHASIC: NORMAL
BH CV LOWER VASCULAR RIGHT COMMON FEMORAL SPONT: NORMAL
BH CV LOWER VASCULAR RIGHT DISTAL FEMORAL COMPRESS: NORMAL
BH CV LOWER VASCULAR RIGHT GASTRONEMIUS COMPRESS: NORMAL
BH CV LOWER VASCULAR RIGHT GREATER SAPH AK COMPRESS: NORMAL
BH CV LOWER VASCULAR RIGHT GREATER SAPH BK COMPRESS: NORMAL
BH CV LOWER VASCULAR RIGHT MID FEMORAL AUGMENT: NORMAL
BH CV LOWER VASCULAR RIGHT MID FEMORAL COMPRESS: NORMAL
BH CV LOWER VASCULAR RIGHT MID FEMORAL PHASIC: NORMAL
BH CV LOWER VASCULAR RIGHT MID FEMORAL SPONT: NORMAL
BH CV LOWER VASCULAR RIGHT PERONEAL COMPRESS: NORMAL
BH CV LOWER VASCULAR RIGHT POPLITEAL AUGMENT: NORMAL
BH CV LOWER VASCULAR RIGHT POPLITEAL COMPRESS: NORMAL
BH CV LOWER VASCULAR RIGHT POPLITEAL PHASIC: NORMAL
BH CV LOWER VASCULAR RIGHT POPLITEAL SPONT: NORMAL
BH CV LOWER VASCULAR RIGHT POSTERIOR TIBIAL COMPRESS: NORMAL
BH CV LOWER VASCULAR RIGHT PROXIMAL FEMORAL COMPRESS: NORMAL
BH CV LOWER VASCULAR RIGHT SAPHENOFEMORAL JUNCTION AUGMENT: NORMAL
BH CV LOWER VASCULAR RIGHT SAPHENOFEMORAL JUNCTION COMPRESS: NORMAL
BH CV LOWER VASCULAR RIGHT SAPHENOFEMORAL JUNCTION PHASIC: NORMAL
BH CV LOWER VASCULAR RIGHT SAPHENOFEMORAL JUNCTION SPONT: NORMAL
BILIRUB SERPL-MCNC: 0.2 MG/DL (ref 0.3–1.2)
BUN BLD-MCNC: 20 MG/DL (ref 9–23)
BUN BLD-MCNC: 24 MG/DL (ref 9–23)
BUN/CREAT SERPL: 10 (ref 7–25)
BUN/CREAT SERPL: 12.5 (ref 7–25)
CALCIUM SPEC-SCNC: 7.6 MG/DL (ref 8.7–10.4)
CALCIUM SPEC-SCNC: 7.8 MG/DL (ref 8.7–10.4)
CHLORIDE SERPL-SCNC: 108 MMOL/L (ref 99–109)
CHLORIDE SERPL-SCNC: 109 MMOL/L (ref 99–109)
CHOLEST SERPL-MCNC: 118 MG/DL (ref 0–200)
CK SERPL-CCNC: 6559 U/L (ref 26–174)
CK SERPL-CCNC: >7800 U/L (ref 26–174)
CO2 SERPL-SCNC: 21 MMOL/L (ref 20–31)
CO2 SERPL-SCNC: 22 MMOL/L (ref 20–31)
CREAT BLD-MCNC: 1.6 MG/DL (ref 0.6–1.3)
CREAT BLD-MCNC: 2.4 MG/DL (ref 0.6–1.3)
DEPRECATED RDW RBC AUTO: 52.6 FL (ref 37–54)
EOSINOPHIL # BLD AUTO: 0.07 10*3/MM3 (ref 0–0.3)
EOSINOPHIL NFR BLD AUTO: 0.7 % (ref 0–3)
ERYTHROCYTE [DISTWIDTH] IN BLOOD BY AUTOMATED COUNT: 17 % (ref 11.3–14.5)
GFR SERPL CREATININE-BSD FRML MDRD: 24 ML/MIN/1.73
GFR SERPL CREATININE-BSD FRML MDRD: 39 ML/MIN/1.73
GLOBULIN UR ELPH-MCNC: 2.3 GM/DL
GLUCOSE BLD-MCNC: 79 MG/DL (ref 70–100)
GLUCOSE BLD-MCNC: 92 MG/DL (ref 70–100)
HAV IGM SERPL QL IA: NORMAL
HBA1C MFR BLD: 5.5 % (ref 4.8–5.6)
HBV CORE IGM SERPL QL IA: NORMAL
HBV SURFACE AG SERPL QL IA: NORMAL
HCT VFR BLD AUTO: 28 % (ref 34.5–44)
HCT VFR BLD AUTO: 31.1 % (ref 34.5–44)
HCV AB SER DONR QL: NORMAL
HDLC SERPL-MCNC: 37 MG/DL (ref 40–60)
HGB BLD-MCNC: 9 G/DL (ref 11.5–15.5)
HGB BLD-MCNC: 9.5 G/DL (ref 11.5–15.5)
HIV1+2 AB SER QL: NORMAL
IMM GRANULOCYTES # BLD: 0.21 10*3/MM3 (ref 0–0.03)
IMM GRANULOCYTES NFR BLD: 2.1 % (ref 0–0.6)
INR PPP: 1.04 (ref 0.91–1.09)
LYMPHOCYTES # BLD AUTO: 1.73 10*3/MM3 (ref 0.6–4.8)
LYMPHOCYTES NFR BLD AUTO: 16.9 % (ref 24–44)
MCH RBC QN AUTO: 27 PG (ref 27–31)
MCHC RBC AUTO-ENTMCNC: 32.1 G/DL (ref 32–36)
MCV RBC AUTO: 84.1 FL (ref 80–99)
MONOCYTES # BLD AUTO: 2.03 10*3/MM3 (ref 0–1)
MONOCYTES NFR BLD AUTO: 19.8 % (ref 0–12)
NEUTROPHILS # BLD AUTO: 6.39 10*3/MM3 (ref 1.5–8.3)
NEUTROPHILS NFR BLD AUTO: 62.4 % (ref 41–71)
PLATELET # BLD AUTO: 259 10*3/MM3 (ref 150–450)
PMV BLD AUTO: 9.9 FL (ref 6–12)
POTASSIUM BLD-SCNC: 4.5 MMOL/L (ref 3.5–5.5)
POTASSIUM BLD-SCNC: 5.1 MMOL/L (ref 3.5–5.5)
PROT SERPL-MCNC: 5.2 G/DL (ref 5.7–8.2)
PROTHROMBIN TIME: 10.9 SECONDS (ref 9.6–11.5)
RBC # BLD AUTO: 3.33 10*6/MM3 (ref 3.89–5.14)
SODIUM BLD-SCNC: 136 MMOL/L (ref 132–146)
SODIUM BLD-SCNC: 139 MMOL/L (ref 132–146)
TRIGL SERPL-MCNC: 322 MG/DL (ref 0–150)
TROPONIN I SERPL-MCNC: <0.006 NG/ML
TSH SERPL DL<=0.05 MIU/L-ACNC: 2 MIU/ML (ref 0.35–5.35)
VANCOMYCIN TROUGH SERPL-MCNC: 13.9 MCG/ML (ref 10–20)
WBC NRBC COR # BLD: 10.24 10*3/MM3 (ref 3.5–10.8)

## 2018-04-24 PROCEDURE — 25010000002 MORPHINE SULFATE (PF) 2 MG/ML SOLUTION: Performed by: NURSE PRACTITIONER

## 2018-04-24 PROCEDURE — 80053 COMPREHEN METABOLIC PANEL: CPT | Performed by: NURSE PRACTITIONER

## 2018-04-24 PROCEDURE — 84443 ASSAY THYROID STIM HORMONE: CPT | Performed by: NURSE PRACTITIONER

## 2018-04-24 PROCEDURE — 86592 SYPHILIS TEST NON-TREP QUAL: CPT | Performed by: NURSE PRACTITIONER

## 2018-04-24 PROCEDURE — 80061 LIPID PANEL: CPT | Performed by: NURSE PRACTITIONER

## 2018-04-24 PROCEDURE — 25010000002 VANCOMYCIN: Performed by: EMERGENCY MEDICINE

## 2018-04-24 PROCEDURE — 85610 PROTHROMBIN TIME: CPT | Performed by: NURSE PRACTITIONER

## 2018-04-24 PROCEDURE — 99233 SBSQ HOSP IP/OBS HIGH 50: CPT | Performed by: HOSPITALIST

## 2018-04-24 PROCEDURE — 85014 HEMATOCRIT: CPT | Performed by: NURSE PRACTITIONER

## 2018-04-24 PROCEDURE — 93971 EXTREMITY STUDY: CPT | Performed by: INTERNAL MEDICINE

## 2018-04-24 PROCEDURE — 25010000002 VANCOMYCIN

## 2018-04-24 PROCEDURE — 80202 ASSAY OF VANCOMYCIN: CPT | Performed by: FAMILY MEDICINE

## 2018-04-24 PROCEDURE — 85025 COMPLETE CBC W/AUTO DIFF WBC: CPT | Performed by: NURSE PRACTITIONER

## 2018-04-24 PROCEDURE — 85730 THROMBOPLASTIN TIME PARTIAL: CPT | Performed by: NURSE PRACTITIONER

## 2018-04-24 PROCEDURE — 25010000002 MEROPENEM: Performed by: NURSE PRACTITIONER

## 2018-04-24 PROCEDURE — 80074 ACUTE HEPATITIS PANEL: CPT | Performed by: NURSE PRACTITIONER

## 2018-04-24 PROCEDURE — 25010000002 HEPARIN (PORCINE) PER 1000 UNITS: Performed by: NURSE PRACTITIONER

## 2018-04-24 PROCEDURE — 85018 HEMOGLOBIN: CPT | Performed by: NURSE PRACTITIONER

## 2018-04-24 PROCEDURE — 82550 ASSAY OF CK (CPK): CPT | Performed by: FAMILY MEDICINE

## 2018-04-24 PROCEDURE — 95819 EEG AWAKE AND ASLEEP: CPT

## 2018-04-24 PROCEDURE — 93971 EXTREMITY STUDY: CPT

## 2018-04-24 PROCEDURE — 25010000002 VANCOMYCIN PER 500 MG

## 2018-04-24 PROCEDURE — 83036 HEMOGLOBIN GLYCOSYLATED A1C: CPT | Performed by: NURSE PRACTITIONER

## 2018-04-24 PROCEDURE — 25010000002 LORAZEPAM PER 2 MG: Performed by: FAMILY MEDICINE

## 2018-04-24 PROCEDURE — G0432 EIA HIV-1/HIV-2 SCREEN: HCPCS | Performed by: NURSE PRACTITIONER

## 2018-04-24 RX ORDER — LORAZEPAM 2 MG/ML
1 INJECTION INTRAMUSCULAR EVERY 6 HOURS PRN
Status: DISCONTINUED | OUTPATIENT
Start: 2018-04-24 | End: 2018-04-30

## 2018-04-24 RX ORDER — MORPHINE SULFATE 2 MG/ML
2 INJECTION, SOLUTION INTRAMUSCULAR; INTRAVENOUS
Status: DISCONTINUED | OUTPATIENT
Start: 2018-04-24 | End: 2018-04-30

## 2018-04-24 RX ADMIN — SODIUM CHLORIDE 100 ML/HR: 9 INJECTION, SOLUTION INTRAVENOUS at 13:22

## 2018-04-24 RX ADMIN — LORAZEPAM 1 MG: 2 INJECTION INTRAMUSCULAR; INTRAVENOUS at 01:39

## 2018-04-24 RX ADMIN — AZTREONAM 1 G: 1 INJECTION, POWDER, LYOPHILIZED, FOR SOLUTION INTRAMUSCULAR; INTRAVENOUS at 21:57

## 2018-04-24 RX ADMIN — MORPHINE SULFATE 2 MG: 10 INJECTION INTRAVENOUS at 01:06

## 2018-04-24 RX ADMIN — AZTREONAM 1 G: 1 INJECTION, POWDER, LYOPHILIZED, FOR SOLUTION INTRAMUSCULAR; INTRAVENOUS at 14:14

## 2018-04-24 RX ADMIN — HEPARIN SODIUM 5000 UNITS: 5000 INJECTION, SOLUTION INTRAVENOUS; SUBCUTANEOUS at 20:12

## 2018-04-24 RX ADMIN — VANCOMYCIN HYDROCHLORIDE 1750 MG: 10 INJECTION, POWDER, LYOPHILIZED, FOR SOLUTION INTRAVENOUS at 15:21

## 2018-04-24 RX ADMIN — ESCITALOPRAM OXALATE 10 MG: 20 TABLET, FILM COATED ORAL at 09:03

## 2018-04-24 RX ADMIN — MEROPENEM 500 MG: 500 INJECTION, POWDER, FOR SOLUTION INTRAVENOUS at 06:04

## 2018-04-24 RX ADMIN — METRONIDAZOLE 500 MG: 500 INJECTION, SOLUTION INTRAVENOUS at 17:37

## 2018-04-24 RX ADMIN — DIVALPROEX SODIUM 500 MG: 500 TABLET, FILM COATED, EXTENDED RELEASE ORAL at 09:02

## 2018-04-24 RX ADMIN — DIVALPROEX SODIUM 1000 MG: 500 TABLET, FILM COATED, EXTENDED RELEASE ORAL at 20:10

## 2018-04-24 RX ADMIN — METRONIDAZOLE 500 MG: 500 INJECTION, SOLUTION INTRAVENOUS at 08:58

## 2018-04-24 RX ADMIN — CETIRIZINE HYDROCHLORIDE 10 MG: 10 TABLET, FILM COATED ORAL at 20:10

## 2018-04-24 RX ADMIN — FAMOTIDINE 20 MG: 20 TABLET, FILM COATED ORAL at 20:12

## 2018-04-24 RX ADMIN — LORAZEPAM 1 MG: 2 INJECTION INTRAMUSCULAR; INTRAVENOUS at 20:24

## 2018-04-24 RX ADMIN — BENZTROPINE MESYLATE 2 MG: 1 TABLET ORAL at 20:10

## 2018-04-24 RX ADMIN — MORPHINE SULFATE 2 MG: 10 INJECTION INTRAVENOUS at 16:10

## 2018-04-24 RX ADMIN — DOCUSATE SODIUM 100 MG: 100 CAPSULE, LIQUID FILLED ORAL at 20:13

## 2018-04-24 RX ADMIN — MONTELUKAST SODIUM 10 MG: 10 TABLET, FILM COATED ORAL at 20:10

## 2018-04-24 RX ADMIN — POLYETHYLENE GLYCOL (3350) 17 G: 17 POWDER, FOR SOLUTION ORAL at 20:13

## 2018-04-24 RX ADMIN — BENZTROPINE MESYLATE 2 MG: 1 TABLET ORAL at 09:02

## 2018-04-24 RX ADMIN — PANTOPRAZOLE SODIUM 40 MG: 40 TABLET, DELAYED RELEASE ORAL at 06:04

## 2018-04-24 RX ADMIN — METRONIDAZOLE 500 MG: 500 INJECTION, SOLUTION INTRAVENOUS at 00:42

## 2018-04-24 RX ADMIN — VANCOMYCIN HYDROCHLORIDE 750 MG: 750 INJECTION, SOLUTION INTRAVENOUS at 16:42

## 2018-04-24 RX ADMIN — MORPHINE SULFATE 2 MG: 10 INJECTION INTRAVENOUS at 19:44

## 2018-04-24 RX ADMIN — PSYLLIUM HUSK 1 PACKET: 3.5 GRANULE ORAL at 20:13

## 2018-04-24 RX ADMIN — VANCOMYCIN HYDROCHLORIDE 1250 MG: 10 INJECTION, POWDER, LYOPHILIZED, FOR SOLUTION INTRAVENOUS at 09:47

## 2018-04-25 ENCOUNTER — APPOINTMENT (OUTPATIENT)
Dept: GENERAL RADIOLOGY | Facility: HOSPITAL | Age: 28
End: 2018-04-25

## 2018-04-25 LAB
ALBUMIN SERPL-MCNC: 3 G/DL (ref 3.2–4.8)
ALBUMIN/GLOB SERPL: 1.3 G/DL (ref 1.5–2.5)
ALP SERPL-CCNC: 74 U/L (ref 25–100)
ALT SERPL W P-5'-P-CCNC: 204 U/L (ref 7–40)
ANION GAP SERPL CALCULATED.3IONS-SCNC: 6 MMOL/L (ref 3–11)
AST SERPL-CCNC: 430 U/L (ref 0–33)
BASOPHILS # BLD AUTO: 0.06 10*3/MM3 (ref 0–0.2)
BASOPHILS NFR BLD AUTO: 0.5 % (ref 0–1)
BILIRUB SERPL-MCNC: 0.2 MG/DL (ref 0.3–1.2)
BUN BLD-MCNC: 9 MG/DL (ref 9–23)
BUN/CREAT SERPL: 15 (ref 7–25)
CALCIUM SPEC-SCNC: 7.3 MG/DL (ref 8.7–10.4)
CHLORIDE SERPL-SCNC: 108 MMOL/L (ref 99–109)
CK SERPL-CCNC: >7800 U/L (ref 26–174)
CO2 SERPL-SCNC: 24 MMOL/L (ref 20–31)
CREAT BLD-MCNC: 0.6 MG/DL (ref 0.6–1.3)
DEPRECATED RDW RBC AUTO: 51.9 FL (ref 37–54)
EOSINOPHIL # BLD AUTO: 0.03 10*3/MM3 (ref 0–0.3)
EOSINOPHIL NFR BLD AUTO: 0.2 % (ref 0–3)
ERYTHROCYTE [DISTWIDTH] IN BLOOD BY AUTOMATED COUNT: 17 % (ref 11.3–14.5)
GFR SERPL CREATININE-BSD FRML MDRD: 121 ML/MIN/1.73
GLOBULIN UR ELPH-MCNC: 2.3 GM/DL
GLUCOSE BLD-MCNC: 86 MG/DL (ref 70–100)
GLUCOSE BLDC GLUCOMTR-MCNC: 94 MG/DL (ref 70–130)
HAV IGM SERPL QL IA: NORMAL
HBV CORE IGM SERPL QL IA: NORMAL
HBV SURFACE AG SERPL QL IA: NORMAL
HCT VFR BLD AUTO: 28.5 % (ref 34.5–44)
HCV AB SER DONR QL: NORMAL
HEMOCCULT STL QL: POSITIVE
HGB BLD-MCNC: 9.1 G/DL (ref 11.5–15.5)
IMM GRANULOCYTES # BLD: 0.48 10*3/MM3 (ref 0–0.03)
IMM GRANULOCYTES NFR BLD: 4 % (ref 0–0.6)
LYMPHOCYTES # BLD AUTO: 2.34 10*3/MM3 (ref 0.6–4.8)
LYMPHOCYTES NFR BLD AUTO: 19.3 % (ref 24–44)
MCH RBC QN AUTO: 26.7 PG (ref 27–31)
MCHC RBC AUTO-ENTMCNC: 31.9 G/DL (ref 32–36)
MCV RBC AUTO: 83.6 FL (ref 80–99)
MONOCYTES # BLD AUTO: 2.57 10*3/MM3 (ref 0–1)
MONOCYTES NFR BLD AUTO: 21.2 % (ref 0–12)
NEUTROPHILS # BLD AUTO: 7.14 10*3/MM3 (ref 1.5–8.3)
NEUTROPHILS NFR BLD AUTO: 58.8 % (ref 41–71)
PLATELET # BLD AUTO: 243 10*3/MM3 (ref 150–450)
PMV BLD AUTO: 9.8 FL (ref 6–12)
POTASSIUM BLD-SCNC: 4.2 MMOL/L (ref 3.5–5.5)
PROT SERPL-MCNC: 5.3 G/DL (ref 5.7–8.2)
RBC # BLD AUTO: 3.41 10*6/MM3 (ref 3.89–5.14)
RPR SER QL: NORMAL
SODIUM BLD-SCNC: 138 MMOL/L (ref 132–146)
VANCOMYCIN SERPL-MCNC: 9.9 MCG/ML (ref 5–40)
VANCOMYCIN TROUGH SERPL-MCNC: 10 MCG/ML (ref 10–20)
WBC NRBC COR # BLD: 12.14 10*3/MM3 (ref 3.5–10.8)

## 2018-04-25 PROCEDURE — 25010000002 MORPHINE SULFATE (PF) 2 MG/ML SOLUTION: Performed by: NURSE PRACTITIONER

## 2018-04-25 PROCEDURE — 82272 OCCULT BLD FECES 1-3 TESTS: CPT | Performed by: NURSE PRACTITIONER

## 2018-04-25 PROCEDURE — 87046 STOOL CULTR AEROBIC BACT EA: CPT | Performed by: NURSE PRACTITIONER

## 2018-04-25 PROCEDURE — 87045 FECES CULTURE AEROBIC BACT: CPT | Performed by: NURSE PRACTITIONER

## 2018-04-25 PROCEDURE — 85025 COMPLETE CBC W/AUTO DIFF WBC: CPT | Performed by: HOSPITALIST

## 2018-04-25 PROCEDURE — 80053 COMPREHEN METABOLIC PANEL: CPT | Performed by: HOSPITALIST

## 2018-04-25 PROCEDURE — 80202 ASSAY OF VANCOMYCIN: CPT | Performed by: FAMILY MEDICINE

## 2018-04-25 PROCEDURE — 87338 HPYLORI STOOL AG IA: CPT | Performed by: NURSE PRACTITIONER

## 2018-04-25 PROCEDURE — 99254 IP/OBS CNSLTJ NEW/EST MOD 60: CPT | Performed by: PSYCHIATRY & NEUROLOGY

## 2018-04-25 PROCEDURE — 25010000002 VANCOMYCIN

## 2018-04-25 PROCEDURE — 99233 SBSQ HOSP IP/OBS HIGH 50: CPT | Performed by: HOSPITALIST

## 2018-04-25 PROCEDURE — 25010000002 HEPARIN (PORCINE) PER 1000 UNITS: Performed by: NURSE PRACTITIONER

## 2018-04-25 PROCEDURE — 82962 GLUCOSE BLOOD TEST: CPT

## 2018-04-25 PROCEDURE — 82550 ASSAY OF CK (CPK): CPT | Performed by: HOSPITALIST

## 2018-04-25 PROCEDURE — 80074 ACUTE HEPATITIS PANEL: CPT | Performed by: HOSPITALIST

## 2018-04-25 PROCEDURE — 80202 ASSAY OF VANCOMYCIN: CPT

## 2018-04-25 PROCEDURE — 71045 X-RAY EXAM CHEST 1 VIEW: CPT

## 2018-04-25 PROCEDURE — 25010000002 LORAZEPAM PER 2 MG: Performed by: FAMILY MEDICINE

## 2018-04-25 RX ORDER — SACCHAROMYCES BOULARDII 250 MG
250 CAPSULE ORAL 2 TIMES DAILY
Status: DISCONTINUED | OUTPATIENT
Start: 2018-04-25 | End: 2018-05-01 | Stop reason: HOSPADM

## 2018-04-25 RX ADMIN — BISACODYL 10 MG: 10 SUPPOSITORY RECTAL at 16:08

## 2018-04-25 RX ADMIN — FOLIC ACID 1 MG: 1 TABLET ORAL at 08:55

## 2018-04-25 RX ADMIN — VANCOMYCIN HYDROCHLORIDE 1250 MG: 10 INJECTION, POWDER, LYOPHILIZED, FOR SOLUTION INTRAVENOUS at 08:55

## 2018-04-25 RX ADMIN — SODIUM CHLORIDE 125 ML/HR: 9 INJECTION, SOLUTION INTRAVENOUS at 23:26

## 2018-04-25 RX ADMIN — SODIUM CHLORIDE 2000 ML: 9 INJECTION, SOLUTION INTRAVENOUS at 10:20

## 2018-04-25 RX ADMIN — AZTREONAM 1 G: 1 INJECTION, POWDER, LYOPHILIZED, FOR SOLUTION INTRAMUSCULAR; INTRAVENOUS at 06:20

## 2018-04-25 RX ADMIN — PANTOPRAZOLE SODIUM 40 MG: 40 TABLET, DELAYED RELEASE ORAL at 08:55

## 2018-04-25 RX ADMIN — DIVALPROEX SODIUM 500 MG: 500 TABLET, FILM COATED, EXTENDED RELEASE ORAL at 08:55

## 2018-04-25 RX ADMIN — METRONIDAZOLE 500 MG: 500 INJECTION, SOLUTION INTRAVENOUS at 07:35

## 2018-04-25 RX ADMIN — HEPARIN SODIUM 5000 UNITS: 5000 INJECTION, SOLUTION INTRAVENOUS; SUBCUTANEOUS at 08:55

## 2018-04-25 RX ADMIN — MONTELUKAST SODIUM 10 MG: 10 TABLET, FILM COATED ORAL at 22:10

## 2018-04-25 RX ADMIN — BENZTROPINE MESYLATE 2 MG: 1 TABLET ORAL at 08:55

## 2018-04-25 RX ADMIN — DOCUSATE SODIUM 100 MG: 100 CAPSULE, LIQUID FILLED ORAL at 08:55

## 2018-04-25 RX ADMIN — SODIUM CHLORIDE 100 ML/HR: 9 INJECTION, SOLUTION INTRAVENOUS at 02:27

## 2018-04-25 RX ADMIN — Medication 250 MG: at 22:10

## 2018-04-25 RX ADMIN — DIVALPROEX SODIUM 1000 MG: 500 TABLET, FILM COATED, EXTENDED RELEASE ORAL at 22:09

## 2018-04-25 RX ADMIN — AZTREONAM 1 G: 1 INJECTION, POWDER, LYOPHILIZED, FOR SOLUTION INTRAMUSCULAR; INTRAVENOUS at 13:00

## 2018-04-25 RX ADMIN — LORAZEPAM 1 MG: 2 INJECTION INTRAMUSCULAR; INTRAVENOUS at 22:08

## 2018-04-25 RX ADMIN — MORPHINE SULFATE 2 MG: 10 INJECTION INTRAVENOUS at 05:36

## 2018-04-25 RX ADMIN — METRONIDAZOLE 500 MG: 500 INJECTION, SOLUTION INTRAVENOUS at 23:26

## 2018-04-25 RX ADMIN — ESCITALOPRAM OXALATE 10 MG: 20 TABLET, FILM COATED ORAL at 08:56

## 2018-04-25 RX ADMIN — METRONIDAZOLE 500 MG: 500 INJECTION, SOLUTION INTRAVENOUS at 00:15

## 2018-04-25 RX ADMIN — CETIRIZINE HYDROCHLORIDE 10 MG: 10 TABLET, FILM COATED ORAL at 22:10

## 2018-04-25 RX ADMIN — AZTREONAM 1 G: 1 INJECTION, POWDER, LYOPHILIZED, FOR SOLUTION INTRAMUSCULAR; INTRAVENOUS at 22:09

## 2018-04-25 RX ADMIN — LORAZEPAM 1 MG: 2 INJECTION INTRAMUSCULAR; INTRAVENOUS at 02:26

## 2018-04-25 RX ADMIN — FAMOTIDINE 20 MG: 20 TABLET, FILM COATED ORAL at 22:10

## 2018-04-25 RX ADMIN — DOCUSATE SODIUM 100 MG: 100 CAPSULE, LIQUID FILLED ORAL at 22:10

## 2018-04-25 RX ADMIN — HEPARIN SODIUM 5000 UNITS: 5000 INJECTION, SOLUTION INTRAVENOUS; SUBCUTANEOUS at 22:08

## 2018-04-25 RX ADMIN — MORPHINE SULFATE 2 MG: 10 INJECTION INTRAVENOUS at 00:15

## 2018-04-25 RX ADMIN — METRONIDAZOLE 500 MG: 500 INJECTION, SOLUTION INTRAVENOUS at 15:00

## 2018-04-25 RX ADMIN — POLYETHYLENE GLYCOL (3350) 17 G: 17 POWDER, FOR SOLUTION ORAL at 08:55

## 2018-04-26 ENCOUNTER — APPOINTMENT (OUTPATIENT)
Dept: GENERAL RADIOLOGY | Facility: HOSPITAL | Age: 28
End: 2018-04-26

## 2018-04-26 ENCOUNTER — APPOINTMENT (OUTPATIENT)
Dept: MRI IMAGING | Facility: HOSPITAL | Age: 28
End: 2018-04-26

## 2018-04-26 LAB
ALBUMIN SERPL-MCNC: 2.8 G/DL (ref 3.2–4.8)
ALBUMIN/GLOB SERPL: 1.3 G/DL (ref 1.5–2.5)
ALP SERPL-CCNC: 105 U/L (ref 25–100)
ALT SERPL W P-5'-P-CCNC: 164 U/L (ref 7–40)
ANION GAP SERPL CALCULATED.3IONS-SCNC: 7 MMOL/L (ref 3–11)
AST SERPL-CCNC: 289 U/L (ref 0–33)
BACTERIA SPEC AEROBE CULT: ABNORMAL
BACTERIA SPEC AEROBE CULT: ABNORMAL
BASOPHILS # BLD MANUAL: 0.12 10*3/MM3 (ref 0–0.2)
BASOPHILS NFR BLD AUTO: 1 % (ref 0–1)
BILIRUB SERPL-MCNC: 0.2 MG/DL (ref 0.3–1.2)
BUN BLD-MCNC: 6 MG/DL (ref 9–23)
BUN/CREAT SERPL: 12 (ref 7–25)
CALCIUM SPEC-SCNC: 7.4 MG/DL (ref 8.7–10.4)
CHLORIDE SERPL-SCNC: 107 MMOL/L (ref 99–109)
CK SERPL-CCNC: >7800 U/L (ref 26–174)
CO2 SERPL-SCNC: 24 MMOL/L (ref 20–31)
CREAT BLD-MCNC: 0.5 MG/DL (ref 0.6–1.3)
DEPRECATED RDW RBC AUTO: 52.3 FL (ref 37–54)
EOSINOPHIL # BLD MANUAL: 0.12 10*3/MM3 (ref 0.1–0.3)
EOSINOPHIL NFR BLD MANUAL: 1 % (ref 0–3)
ERYTHROCYTE [DISTWIDTH] IN BLOOD BY AUTOMATED COUNT: 16.6 % (ref 11.3–14.5)
GFR SERPL CREATININE-BSD FRML MDRD: 149 ML/MIN/1.73
GLOBULIN UR ELPH-MCNC: 2.1 GM/DL
GLUCOSE BLD-MCNC: 85 MG/DL (ref 70–100)
GLUCOSE BLDC GLUCOMTR-MCNC: 95 MG/DL (ref 70–130)
HCT VFR BLD AUTO: 25.2 % (ref 34.5–44)
HGB BLD-MCNC: 8.1 G/DL (ref 11.5–15.5)
HYPOCHROMIA BLD QL: ABNORMAL
LYMPHOCYTES # BLD MANUAL: 4.16 10*3/MM3 (ref 0.6–4.8)
LYMPHOCYTES NFR BLD MANUAL: 12 % (ref 0–12)
LYMPHOCYTES NFR BLD MANUAL: 34 % (ref 24–44)
MCH RBC QN AUTO: 27.4 PG (ref 27–31)
MCHC RBC AUTO-ENTMCNC: 32.1 G/DL (ref 32–36)
MCV RBC AUTO: 85.1 FL (ref 80–99)
METAMYELOCYTES NFR BLD MANUAL: 3 % (ref 0–0)
MONOCYTES # BLD AUTO: 1.47 10*3/MM3 (ref 0–1)
MYELOCYTES NFR BLD MANUAL: 1 % (ref 0–0)
NEUTROPHILS # BLD AUTO: 5.88 10*3/MM3 (ref 1.5–8.3)
NEUTROPHILS NFR BLD MANUAL: 47 % (ref 41–71)
NEUTS BAND NFR BLD MANUAL: 1 % (ref 0–5)
NRBC SPEC MANUAL: 1 /100 WBC (ref 0–0)
PLAT MORPH BLD: NORMAL
PLATELET # BLD AUTO: 209 10*3/MM3 (ref 150–450)
PMV BLD AUTO: 8.8 FL (ref 6–12)
POTASSIUM BLD-SCNC: 4 MMOL/L (ref 3.5–5.5)
PROT SERPL-MCNC: 4.9 G/DL (ref 5.7–8.2)
RBC # BLD AUTO: 2.96 10*6/MM3 (ref 3.89–5.14)
SMUDGE CELLS BLD QL SMEAR: ABNORMAL
SODIUM BLD-SCNC: 138 MMOL/L (ref 132–146)
WBC NRBC COR # BLD: 12.24 10*3/MM3 (ref 3.5–10.8)

## 2018-04-26 PROCEDURE — 82550 ASSAY OF CK (CPK): CPT | Performed by: HOSPITALIST

## 2018-04-26 PROCEDURE — 94799 UNLISTED PULMONARY SVC/PX: CPT

## 2018-04-26 PROCEDURE — 87591 N.GONORRHOEAE DNA AMP PROB: CPT | Performed by: NURSE PRACTITIONER

## 2018-04-26 PROCEDURE — 99232 SBSQ HOSP IP/OBS MODERATE 35: CPT | Performed by: PSYCHIATRY & NEUROLOGY

## 2018-04-26 PROCEDURE — 25010000002 HEPARIN (PORCINE) PER 1000 UNITS: Performed by: NURSE PRACTITIONER

## 2018-04-26 PROCEDURE — 80053 COMPREHEN METABOLIC PANEL: CPT | Performed by: HOSPITALIST

## 2018-04-26 PROCEDURE — 85025 COMPLETE CBC W/AUTO DIFF WBC: CPT | Performed by: HOSPITALIST

## 2018-04-26 PROCEDURE — 99253 IP/OBS CNSLTJ NEW/EST LOW 45: CPT | Performed by: INTERNAL MEDICINE

## 2018-04-26 PROCEDURE — 82962 GLUCOSE BLOOD TEST: CPT

## 2018-04-26 PROCEDURE — 72141 MRI NECK SPINE W/O DYE: CPT

## 2018-04-26 PROCEDURE — 87661 TRICHOMONAS VAGINALIS AMPLIF: CPT | Performed by: NURSE PRACTITIONER

## 2018-04-26 PROCEDURE — 73060 X-RAY EXAM OF HUMERUS: CPT

## 2018-04-26 PROCEDURE — 99233 SBSQ HOSP IP/OBS HIGH 50: CPT | Performed by: FAMILY MEDICINE

## 2018-04-26 PROCEDURE — 70551 MRI BRAIN STEM W/O DYE: CPT

## 2018-04-26 PROCEDURE — 73552 X-RAY EXAM OF FEMUR 2/>: CPT

## 2018-04-26 PROCEDURE — 85007 BL SMEAR W/DIFF WBC COUNT: CPT | Performed by: HOSPITALIST

## 2018-04-26 PROCEDURE — 25010000002 LORAZEPAM PER 2 MG: Performed by: FAMILY MEDICINE

## 2018-04-26 PROCEDURE — 87491 CHLMYD TRACH DNA AMP PROBE: CPT | Performed by: NURSE PRACTITIONER

## 2018-04-26 PROCEDURE — 72170 X-RAY EXAM OF PELVIS: CPT

## 2018-04-26 RX ORDER — QUETIAPINE FUMARATE 25 MG/1
25 TABLET, FILM COATED ORAL EVERY 12 HOURS SCHEDULED
Status: DISCONTINUED | OUTPATIENT
Start: 2018-04-26 | End: 2018-04-30

## 2018-04-26 RX ORDER — LORAZEPAM 2 MG/ML
1 INJECTION INTRAMUSCULAR EVERY 8 HOURS PRN
Status: DISCONTINUED | OUTPATIENT
Start: 2018-04-26 | End: 2018-04-29

## 2018-04-26 RX ORDER — HALOPERIDOL 5 MG/1
5 TABLET ORAL 2 TIMES DAILY PRN
Status: DISCONTINUED | OUTPATIENT
Start: 2018-04-26 | End: 2018-04-30

## 2018-04-26 RX ORDER — TRAZODONE HYDROCHLORIDE 50 MG/1
50 TABLET ORAL NIGHTLY
Status: DISCONTINUED | OUTPATIENT
Start: 2018-04-26 | End: 2018-05-01 | Stop reason: HOSPADM

## 2018-04-26 RX ADMIN — FOLIC ACID 1 MG: 1 TABLET ORAL at 08:39

## 2018-04-26 RX ADMIN — PANTOPRAZOLE SODIUM 40 MG: 40 TABLET, DELAYED RELEASE ORAL at 06:15

## 2018-04-26 RX ADMIN — TRAZODONE HYDROCHLORIDE 50 MG: 50 TABLET ORAL at 21:39

## 2018-04-26 RX ADMIN — METRONIDAZOLE 500 MG: 500 INJECTION, SOLUTION INTRAVENOUS at 16:23

## 2018-04-26 RX ADMIN — FAMOTIDINE 20 MG: 20 TABLET, FILM COATED ORAL at 21:39

## 2018-04-26 RX ADMIN — AZTREONAM 1 G: 1 INJECTION, POWDER, LYOPHILIZED, FOR SOLUTION INTRAMUSCULAR; INTRAVENOUS at 15:24

## 2018-04-26 RX ADMIN — METRONIDAZOLE 500 MG: 500 INJECTION, SOLUTION INTRAVENOUS at 23:44

## 2018-04-26 RX ADMIN — SODIUM CHLORIDE 125 ML/HR: 9 INJECTION, SOLUTION INTRAVENOUS at 21:51

## 2018-04-26 RX ADMIN — BENZTROPINE MESYLATE 2 MG: 1 TABLET ORAL at 08:40

## 2018-04-26 RX ADMIN — DIVALPROEX SODIUM 1000 MG: 500 TABLET, FILM COATED, EXTENDED RELEASE ORAL at 21:38

## 2018-04-26 RX ADMIN — AZTREONAM 1 G: 1 INJECTION, POWDER, LYOPHILIZED, FOR SOLUTION INTRAMUSCULAR; INTRAVENOUS at 21:37

## 2018-04-26 RX ADMIN — DOCUSATE SODIUM 100 MG: 100 CAPSULE, LIQUID FILLED ORAL at 08:39

## 2018-04-26 RX ADMIN — MONTELUKAST SODIUM 10 MG: 10 TABLET, FILM COATED ORAL at 21:40

## 2018-04-26 RX ADMIN — PSYLLIUM HUSK 2 PACKET: 3.5 GRANULE ORAL at 21:49

## 2018-04-26 RX ADMIN — POLYETHYLENE GLYCOL (3350) 17 G: 17 POWDER, FOR SOLUTION ORAL at 08:40

## 2018-04-26 RX ADMIN — LORAZEPAM 1 MG: 2 INJECTION INTRAMUSCULAR; INTRAVENOUS at 16:14

## 2018-04-26 RX ADMIN — LORAZEPAM 1 MG: 2 INJECTION INTRAMUSCULAR; INTRAVENOUS at 22:39

## 2018-04-26 RX ADMIN — Medication 250 MG: at 21:38

## 2018-04-26 RX ADMIN — Medication 250 MG: at 08:39

## 2018-04-26 RX ADMIN — ESCITALOPRAM OXALATE 10 MG: 20 TABLET, FILM COATED ORAL at 08:39

## 2018-04-26 RX ADMIN — FLUTICASONE PROPIONATE 2 SPRAY: 50 SPRAY, METERED NASAL at 08:40

## 2018-04-26 RX ADMIN — METRONIDAZOLE 500 MG: 500 INJECTION, SOLUTION INTRAVENOUS at 08:06

## 2018-04-26 RX ADMIN — SODIUM CHLORIDE 125 ML/HR: 9 INJECTION, SOLUTION INTRAVENOUS at 08:05

## 2018-04-26 RX ADMIN — QUETIAPINE FUMARATE 25 MG: 25 TABLET ORAL at 21:39

## 2018-04-26 RX ADMIN — HEPARIN SODIUM 5000 UNITS: 5000 INJECTION, SOLUTION INTRAVENOUS; SUBCUTANEOUS at 21:39

## 2018-04-26 RX ADMIN — HEPARIN SODIUM 5000 UNITS: 5000 INJECTION, SOLUTION INTRAVENOUS; SUBCUTANEOUS at 08:39

## 2018-04-26 RX ADMIN — PSYLLIUM HUSK 1 PACKET: 3.5 GRANULE ORAL at 08:39

## 2018-04-26 RX ADMIN — BENZTROPINE MESYLATE 2 MG: 1 TABLET ORAL at 21:51

## 2018-04-26 RX ADMIN — DOCUSATE SODIUM 100 MG: 100 CAPSULE, LIQUID FILLED ORAL at 21:39

## 2018-04-26 RX ADMIN — DIVALPROEX SODIUM 500 MG: 500 TABLET, FILM COATED, EXTENDED RELEASE ORAL at 08:40

## 2018-04-26 RX ADMIN — ACETAMINOPHEN 650 MG: 325 TABLET ORAL at 21:39

## 2018-04-26 RX ADMIN — CETIRIZINE HYDROCHLORIDE 10 MG: 10 TABLET, FILM COATED ORAL at 21:39

## 2018-04-26 RX ADMIN — AZTREONAM 1 G: 1 INJECTION, POWDER, LYOPHILIZED, FOR SOLUTION INTRAMUSCULAR; INTRAVENOUS at 06:15

## 2018-04-27 ENCOUNTER — APPOINTMENT (OUTPATIENT)
Dept: GENERAL RADIOLOGY | Facility: HOSPITAL | Age: 28
End: 2018-04-27

## 2018-04-27 LAB
ALBUMIN SERPL-MCNC: 2.3 G/DL (ref 3.2–4.8)
ALBUMIN/GLOB SERPL: 1.2 G/DL (ref 1.5–2.5)
ALP SERPL-CCNC: 107 U/L (ref 25–100)
ALT SERPL W P-5'-P-CCNC: 170 U/L (ref 7–40)
ANION GAP SERPL CALCULATED.3IONS-SCNC: 6 MMOL/L (ref 3–11)
AST SERPL-CCNC: 212 U/L (ref 0–33)
BILIRUB SERPL-MCNC: 0.2 MG/DL (ref 0.3–1.2)
BILIRUB UR QL STRIP: NEGATIVE
BUN BLD-MCNC: 7 MG/DL (ref 9–23)
BUN/CREAT SERPL: 14 (ref 7–25)
CALCIUM SPEC-SCNC: 6.6 MG/DL (ref 8.7–10.4)
CHLORIDE SERPL-SCNC: 110 MMOL/L (ref 99–109)
CK SERPL-CCNC: 7078 U/L (ref 26–174)
CLARITY UR: CLEAR
CO2 SERPL-SCNC: 23 MMOL/L (ref 20–31)
COLOR UR: YELLOW
CREAT BLD-MCNC: 0.5 MG/DL (ref 0.6–1.3)
DEPRECATED RDW RBC AUTO: 54.4 FL (ref 37–54)
ERYTHROCYTE [DISTWIDTH] IN BLOOD BY AUTOMATED COUNT: 17.1 % (ref 11.3–14.5)
GFR SERPL CREATININE-BSD FRML MDRD: 149 ML/MIN/1.73
GLOBULIN UR ELPH-MCNC: 1.9 GM/DL
GLUCOSE BLD-MCNC: 111 MG/DL (ref 70–100)
GLUCOSE UR STRIP-MCNC: NEGATIVE MG/DL
HCT VFR BLD AUTO: 25.7 % (ref 34.5–44)
HGB BLD-MCNC: 7.9 G/DL (ref 11.5–15.5)
HGB UR QL STRIP.AUTO: NEGATIVE
KETONES UR QL STRIP: NEGATIVE
LEUKOCYTE ESTERASE UR QL STRIP.AUTO: NEGATIVE
MCH RBC QN AUTO: 26.7 PG (ref 27–31)
MCHC RBC AUTO-ENTMCNC: 30.7 G/DL (ref 32–36)
MCV RBC AUTO: 86.8 FL (ref 80–99)
NITRITE UR QL STRIP: NEGATIVE
PH UR STRIP.AUTO: 5.5 [PH] (ref 5–8)
PLATELET # BLD AUTO: 221 10*3/MM3 (ref 150–450)
PMV BLD AUTO: 9.3 FL (ref 6–12)
POTASSIUM BLD-SCNC: 4.1 MMOL/L (ref 3.5–5.5)
PROT SERPL-MCNC: 4.2 G/DL (ref 5.7–8.2)
PROT UR QL STRIP: NEGATIVE
RBC # BLD AUTO: 2.96 10*6/MM3 (ref 3.89–5.14)
SODIUM BLD-SCNC: 139 MMOL/L (ref 132–146)
SP GR UR STRIP: 1.02 (ref 1–1.03)
UROBILINOGEN UR QL STRIP: NORMAL
WBC NRBC COR # BLD: 14.92 10*3/MM3 (ref 3.5–10.8)

## 2018-04-27 PROCEDURE — 81003 URINALYSIS AUTO W/O SCOPE: CPT | Performed by: FAMILY MEDICINE

## 2018-04-27 PROCEDURE — 25010000002 HEPARIN (PORCINE) PER 1000 UNITS: Performed by: NURSE PRACTITIONER

## 2018-04-27 PROCEDURE — 25010000002 CEFTRIAXONE PER 250 MG: Performed by: FAMILY MEDICINE

## 2018-04-27 PROCEDURE — 80053 COMPREHEN METABOLIC PANEL: CPT | Performed by: FAMILY MEDICINE

## 2018-04-27 PROCEDURE — 82550 ASSAY OF CK (CPK): CPT | Performed by: FAMILY MEDICINE

## 2018-04-27 PROCEDURE — 99233 SBSQ HOSP IP/OBS HIGH 50: CPT | Performed by: FAMILY MEDICINE

## 2018-04-27 PROCEDURE — 25010000002 MORPHINE SULFATE (PF) 2 MG/ML SOLUTION: Performed by: NURSE PRACTITIONER

## 2018-04-27 PROCEDURE — 85027 COMPLETE CBC AUTOMATED: CPT | Performed by: FAMILY MEDICINE

## 2018-04-27 PROCEDURE — 25010000002 LORAZEPAM PER 2 MG: Performed by: FAMILY MEDICINE

## 2018-04-27 PROCEDURE — 71045 X-RAY EXAM CHEST 1 VIEW: CPT

## 2018-04-27 PROCEDURE — 99232 SBSQ HOSP IP/OBS MODERATE 35: CPT | Performed by: PSYCHIATRY & NEUROLOGY

## 2018-04-27 PROCEDURE — 25010000002 HALOPERIDOL LACTATE PER 5 MG: Performed by: NURSE PRACTITIONER

## 2018-04-27 RX ORDER — HALOPERIDOL 5 MG/ML
3 INJECTION INTRAMUSCULAR ONCE
Status: COMPLETED | OUTPATIENT
Start: 2018-04-27 | End: 2018-04-27

## 2018-04-27 RX ORDER — SODIUM CHLORIDE 9 MG/ML
100 INJECTION, SOLUTION INTRAVENOUS CONTINUOUS
Status: DISCONTINUED | OUTPATIENT
Start: 2018-04-27 | End: 2018-04-29

## 2018-04-27 RX ORDER — CEFTRIAXONE SODIUM 1 G/50ML
1 INJECTION, SOLUTION INTRAVENOUS EVERY 24 HOURS
Status: COMPLETED | OUTPATIENT
Start: 2018-04-27 | End: 2018-04-29

## 2018-04-27 RX ADMIN — HEPARIN SODIUM 5000 UNITS: 5000 INJECTION, SOLUTION INTRAVENOUS; SUBCUTANEOUS at 20:37

## 2018-04-27 RX ADMIN — POLYETHYLENE GLYCOL (3350) 17 G: 17 POWDER, FOR SOLUTION ORAL at 08:03

## 2018-04-27 RX ADMIN — HALOPERIDOL LACTATE 3 MG: 5 INJECTION, SOLUTION INTRAMUSCULAR at 20:38

## 2018-04-27 RX ADMIN — TRAZODONE HYDROCHLORIDE 50 MG: 50 TABLET ORAL at 20:36

## 2018-04-27 RX ADMIN — CEFTRIAXONE SODIUM 1 G: 1 INJECTION, SOLUTION INTRAVENOUS at 16:54

## 2018-04-27 RX ADMIN — METRONIDAZOLE 500 MG: 500 INJECTION, SOLUTION INTRAVENOUS at 15:56

## 2018-04-27 RX ADMIN — DOCUSATE SODIUM 100 MG: 100 CAPSULE, LIQUID FILLED ORAL at 20:36

## 2018-04-27 RX ADMIN — AZTREONAM 1 G: 1 INJECTION, POWDER, LYOPHILIZED, FOR SOLUTION INTRAMUSCULAR; INTRAVENOUS at 06:01

## 2018-04-27 RX ADMIN — CETIRIZINE HYDROCHLORIDE 10 MG: 10 TABLET, FILM COATED ORAL at 20:37

## 2018-04-27 RX ADMIN — PANTOPRAZOLE SODIUM 40 MG: 40 TABLET, DELAYED RELEASE ORAL at 06:01

## 2018-04-27 RX ADMIN — METRONIDAZOLE 500 MG: 500 INJECTION, SOLUTION INTRAVENOUS at 07:52

## 2018-04-27 RX ADMIN — DOCUSATE SODIUM 100 MG: 100 CAPSULE, LIQUID FILLED ORAL at 08:04

## 2018-04-27 RX ADMIN — LORAZEPAM 1 MG: 2 INJECTION INTRAMUSCULAR; INTRAVENOUS at 19:28

## 2018-04-27 RX ADMIN — FOLIC ACID 1 MG: 1 TABLET ORAL at 08:03

## 2018-04-27 RX ADMIN — FAMOTIDINE 20 MG: 20 TABLET, FILM COATED ORAL at 20:36

## 2018-04-27 RX ADMIN — ACETAMINOPHEN 650 MG: 325 TABLET ORAL at 12:39

## 2018-04-27 RX ADMIN — QUETIAPINE FUMARATE 25 MG: 25 TABLET ORAL at 20:36

## 2018-04-27 RX ADMIN — MORPHINE SULFATE 2 MG: 10 INJECTION INTRAVENOUS at 19:51

## 2018-04-27 RX ADMIN — MONTELUKAST SODIUM 10 MG: 10 TABLET, FILM COATED ORAL at 20:36

## 2018-04-27 RX ADMIN — BENZTROPINE MESYLATE 2 MG: 1 TABLET ORAL at 20:37

## 2018-04-27 RX ADMIN — SODIUM CHLORIDE 125 ML/HR: 9 INJECTION, SOLUTION INTRAVENOUS at 09:19

## 2018-04-27 RX ADMIN — Medication 250 MG: at 20:37

## 2018-04-27 RX ADMIN — Medication 250 MG: at 08:03

## 2018-04-27 RX ADMIN — QUETIAPINE FUMARATE 25 MG: 25 TABLET ORAL at 08:03

## 2018-04-27 RX ADMIN — BENZTROPINE MESYLATE 2 MG: 1 TABLET ORAL at 08:03

## 2018-04-27 RX ADMIN — HEPARIN SODIUM 5000 UNITS: 5000 INJECTION, SOLUTION INTRAVENOUS; SUBCUTANEOUS at 08:03

## 2018-04-27 RX ADMIN — DIVALPROEX SODIUM 1000 MG: 500 TABLET, FILM COATED, EXTENDED RELEASE ORAL at 20:40

## 2018-04-27 RX ADMIN — DIVALPROEX SODIUM 500 MG: 500 TABLET, FILM COATED, EXTENDED RELEASE ORAL at 08:04

## 2018-04-27 RX ADMIN — ESCITALOPRAM OXALATE 10 MG: 20 TABLET, FILM COATED ORAL at 08:04

## 2018-04-28 ENCOUNTER — APPOINTMENT (OUTPATIENT)
Dept: MRI IMAGING | Facility: HOSPITAL | Age: 28
End: 2018-04-28

## 2018-04-28 LAB
ALBUMIN SERPL-MCNC: 2.6 G/DL (ref 3.2–4.8)
ALBUMIN/GLOB SERPL: 1.3 G/DL (ref 1.5–2.5)
ALP SERPL-CCNC: 115 U/L (ref 25–100)
ALT SERPL W P-5'-P-CCNC: 156 U/L (ref 7–40)
ANION GAP SERPL CALCULATED.3IONS-SCNC: 3 MMOL/L (ref 3–11)
AST SERPL-CCNC: 152 U/L (ref 0–33)
BACTERIA SPEC AEROBE CULT: NORMAL
BACTERIA SPEC AEROBE CULT: NORMAL
BACTERIA STL CULT: NORMAL
BILIRUB SERPL-MCNC: 0.2 MG/DL (ref 0.3–1.2)
BUN BLD-MCNC: 8 MG/DL (ref 9–23)
BUN/CREAT SERPL: 16 (ref 7–25)
C TRACH RRNA SPEC DONR QL NAA+PROBE: NEGATIVE
CALCIUM SPEC-SCNC: 7.8 MG/DL (ref 8.7–10.4)
CHLORIDE SERPL-SCNC: 107 MMOL/L (ref 99–109)
CK SERPL-CCNC: 3477 U/L (ref 26–174)
CO2 SERPL-SCNC: 29 MMOL/L (ref 20–31)
CREAT BLD-MCNC: 0.5 MG/DL (ref 0.6–1.3)
DEPRECATED RDW RBC AUTO: 51.2 FL (ref 37–54)
ERYTHROCYTE [DISTWIDTH] IN BLOOD BY AUTOMATED COUNT: 16.6 % (ref 11.3–14.5)
GFR SERPL CREATININE-BSD FRML MDRD: 149 ML/MIN/1.73
GLOBULIN UR ELPH-MCNC: 2 GM/DL
GLUCOSE BLD-MCNC: 89 MG/DL (ref 70–100)
GNRH SERPL-MCNC: NEGATIVE
HCT VFR BLD AUTO: 28.5 % (ref 34.5–44)
HGB BLD-MCNC: 9.2 G/DL (ref 11.5–15.5)
MCH RBC QN AUTO: 27.1 PG (ref 27–31)
MCHC RBC AUTO-ENTMCNC: 32.3 G/DL (ref 32–36)
MCV RBC AUTO: 83.8 FL (ref 80–99)
PLATELET # BLD AUTO: 290 10*3/MM3 (ref 150–450)
PMV BLD AUTO: 8.7 FL (ref 6–12)
POTASSIUM BLD-SCNC: 3.9 MMOL/L (ref 3.5–5.5)
PROT SERPL-MCNC: 4.6 G/DL (ref 5.7–8.2)
RBC # BLD AUTO: 3.4 10*6/MM3 (ref 3.89–5.14)
SODIUM BLD-SCNC: 139 MMOL/L (ref 132–146)
T VAGINALIS RRNA GENITAL QL PROBE: NEGATIVE
WBC NRBC COR # BLD: 12.48 10*3/MM3 (ref 3.5–10.8)

## 2018-04-28 PROCEDURE — 25010000002 LORAZEPAM PER 2 MG: Performed by: FAMILY MEDICINE

## 2018-04-28 PROCEDURE — 72148 MRI LUMBAR SPINE W/O DYE: CPT

## 2018-04-28 PROCEDURE — 97166 OT EVAL MOD COMPLEX 45 MIN: CPT | Performed by: OCCUPATIONAL THERAPIST

## 2018-04-28 PROCEDURE — 99254 IP/OBS CNSLTJ NEW/EST MOD 60: CPT | Performed by: ORTHOPAEDIC SURGERY

## 2018-04-28 PROCEDURE — 72146 MRI CHEST SPINE W/O DYE: CPT

## 2018-04-28 PROCEDURE — 97530 THERAPEUTIC ACTIVITIES: CPT | Performed by: OCCUPATIONAL THERAPIST

## 2018-04-28 PROCEDURE — 25010000002 HEPARIN (PORCINE) PER 1000 UNITS: Performed by: NURSE PRACTITIONER

## 2018-04-28 PROCEDURE — 97162 PT EVAL MOD COMPLEX 30 MIN: CPT

## 2018-04-28 PROCEDURE — 25010000002 CEFTRIAXONE PER 250 MG: Performed by: FAMILY MEDICINE

## 2018-04-28 PROCEDURE — 25010000002 MORPHINE SULFATE (PF) 2 MG/ML SOLUTION: Performed by: NURSE PRACTITIONER

## 2018-04-28 PROCEDURE — 99233 SBSQ HOSP IP/OBS HIGH 50: CPT | Performed by: FAMILY MEDICINE

## 2018-04-28 PROCEDURE — 80053 COMPREHEN METABOLIC PANEL: CPT | Performed by: FAMILY MEDICINE

## 2018-04-28 PROCEDURE — 82550 ASSAY OF CK (CPK): CPT | Performed by: FAMILY MEDICINE

## 2018-04-28 PROCEDURE — 85027 COMPLETE CBC AUTOMATED: CPT | Performed by: FAMILY MEDICINE

## 2018-04-28 RX ADMIN — MORPHINE SULFATE 2 MG: 10 INJECTION INTRAVENOUS at 17:38

## 2018-04-28 RX ADMIN — Medication 250 MG: at 09:24

## 2018-04-28 RX ADMIN — METRONIDAZOLE 500 MG: 500 INJECTION, SOLUTION INTRAVENOUS at 09:22

## 2018-04-28 RX ADMIN — FOLIC ACID 1 MG: 1 TABLET ORAL at 09:23

## 2018-04-28 RX ADMIN — PANTOPRAZOLE SODIUM 40 MG: 40 TABLET, DELAYED RELEASE ORAL at 09:23

## 2018-04-28 RX ADMIN — LORAZEPAM 1 MG: 2 INJECTION INTRAMUSCULAR; INTRAVENOUS at 16:29

## 2018-04-28 RX ADMIN — LORAZEPAM 1 MG: 2 INJECTION INTRAMUSCULAR; INTRAVENOUS at 20:55

## 2018-04-28 RX ADMIN — HEPARIN SODIUM 5000 UNITS: 5000 INJECTION, SOLUTION INTRAVENOUS; SUBCUTANEOUS at 09:24

## 2018-04-28 RX ADMIN — BENZTROPINE MESYLATE 2 MG: 1 TABLET ORAL at 09:24

## 2018-04-28 RX ADMIN — QUETIAPINE FUMARATE 25 MG: 25 TABLET ORAL at 09:23

## 2018-04-28 RX ADMIN — CEFTRIAXONE SODIUM 1 G: 1 INJECTION, SOLUTION INTRAVENOUS at 16:03

## 2018-04-28 RX ADMIN — FLUTICASONE PROPIONATE 2 SPRAY: 50 SPRAY, METERED NASAL at 08:30

## 2018-04-28 RX ADMIN — ESCITALOPRAM OXALATE 10 MG: 20 TABLET, FILM COATED ORAL at 09:24

## 2018-04-28 RX ADMIN — METRONIDAZOLE 500 MG: 500 INJECTION, SOLUTION INTRAVENOUS at 16:02

## 2018-04-29 LAB
ANION GAP SERPL CALCULATED.3IONS-SCNC: 4 MMOL/L (ref 3–11)
BUN BLD-MCNC: 11 MG/DL (ref 9–23)
BUN/CREAT SERPL: 22 (ref 7–25)
CALCIUM SPEC-SCNC: 8.9 MG/DL (ref 8.7–10.4)
CHLORIDE SERPL-SCNC: 102 MMOL/L (ref 99–109)
CK SERPL-CCNC: 2353 U/L (ref 26–174)
CO2 SERPL-SCNC: 30 MMOL/L (ref 20–31)
CREAT BLD-MCNC: 0.5 MG/DL (ref 0.6–1.3)
DEPRECATED RDW RBC AUTO: 50.9 FL (ref 37–54)
ERYTHROCYTE [DISTWIDTH] IN BLOOD BY AUTOMATED COUNT: 16.6 % (ref 11.3–14.5)
GFR SERPL CREATININE-BSD FRML MDRD: 149 ML/MIN/1.73
GLUCOSE BLD-MCNC: 110 MG/DL (ref 70–100)
HCT VFR BLD AUTO: 27.2 % (ref 34.5–44)
HGB BLD-MCNC: 8.8 G/DL (ref 11.5–15.5)
MCH RBC QN AUTO: 27.1 PG (ref 27–31)
MCHC RBC AUTO-ENTMCNC: 32.4 G/DL (ref 32–36)
MCV RBC AUTO: 83.7 FL (ref 80–99)
PLATELET # BLD AUTO: 346 10*3/MM3 (ref 150–450)
PMV BLD AUTO: 8.6 FL (ref 6–12)
POTASSIUM BLD-SCNC: 3.9 MMOL/L (ref 3.5–5.5)
RBC # BLD AUTO: 3.25 10*6/MM3 (ref 3.89–5.14)
SODIUM BLD-SCNC: 136 MMOL/L (ref 132–146)
WBC NRBC COR # BLD: 11.73 10*3/MM3 (ref 3.5–10.8)

## 2018-04-29 PROCEDURE — 80048 BASIC METABOLIC PNL TOTAL CA: CPT | Performed by: FAMILY MEDICINE

## 2018-04-29 PROCEDURE — 25010000002 LORAZEPAM PER 2 MG: Performed by: FAMILY MEDICINE

## 2018-04-29 PROCEDURE — 85027 COMPLETE CBC AUTOMATED: CPT | Performed by: FAMILY MEDICINE

## 2018-04-29 PROCEDURE — 25010000002 LORAZEPAM PER 2 MG: Performed by: INTERNAL MEDICINE

## 2018-04-29 PROCEDURE — 99233 SBSQ HOSP IP/OBS HIGH 50: CPT | Performed by: FAMILY MEDICINE

## 2018-04-29 PROCEDURE — 25010000002 MORPHINE SULFATE (PF) 2 MG/ML SOLUTION: Performed by: NURSE PRACTITIONER

## 2018-04-29 PROCEDURE — 25010000002 HEPARIN (PORCINE) PER 1000 UNITS: Performed by: NURSE PRACTITIONER

## 2018-04-29 PROCEDURE — 82550 ASSAY OF CK (CPK): CPT | Performed by: FAMILY MEDICINE

## 2018-04-29 PROCEDURE — 25010000002 CEFTRIAXONE PER 250 MG: Performed by: FAMILY MEDICINE

## 2018-04-29 RX ORDER — LORAZEPAM 2 MG/ML
1 INJECTION INTRAMUSCULAR EVERY 4 HOURS PRN
Status: DISCONTINUED | OUTPATIENT
Start: 2018-04-29 | End: 2018-05-01 | Stop reason: HOSPADM

## 2018-04-29 RX ORDER — SODIUM CHLORIDE 9 MG/ML
75 INJECTION, SOLUTION INTRAVENOUS CONTINUOUS
Status: DISCONTINUED | OUTPATIENT
Start: 2018-04-29 | End: 2018-04-30

## 2018-04-29 RX ADMIN — FOLIC ACID 1 MG: 1 TABLET ORAL at 08:08

## 2018-04-29 RX ADMIN — Medication 250 MG: at 08:08

## 2018-04-29 RX ADMIN — MORPHINE SULFATE 2 MG: 10 INJECTION INTRAVENOUS at 20:01

## 2018-04-29 RX ADMIN — POLYETHYLENE GLYCOL (3350) 17 G: 17 POWDER, FOR SOLUTION ORAL at 20:01

## 2018-04-29 RX ADMIN — FAMOTIDINE 20 MG: 20 TABLET, FILM COATED ORAL at 20:01

## 2018-04-29 RX ADMIN — METRONIDAZOLE 500 MG: 500 INJECTION, SOLUTION INTRAVENOUS at 23:10

## 2018-04-29 RX ADMIN — METRONIDAZOLE 500 MG: 500 INJECTION, SOLUTION INTRAVENOUS at 16:20

## 2018-04-29 RX ADMIN — BENZTROPINE MESYLATE 2 MG: 1 TABLET ORAL at 20:00

## 2018-04-29 RX ADMIN — MORPHINE SULFATE 2 MG: 10 INJECTION INTRAVENOUS at 05:22

## 2018-04-29 RX ADMIN — LORAZEPAM 1 MG: 2 INJECTION INTRAMUSCULAR; INTRAVENOUS at 20:53

## 2018-04-29 RX ADMIN — QUETIAPINE FUMARATE 25 MG: 25 TABLET ORAL at 22:42

## 2018-04-29 RX ADMIN — METRONIDAZOLE 500 MG: 500 INJECTION, SOLUTION INTRAVENOUS at 09:13

## 2018-04-29 RX ADMIN — LORAZEPAM 1 MG: 2 INJECTION INTRAMUSCULAR; INTRAVENOUS at 23:10

## 2018-04-29 RX ADMIN — MONTELUKAST SODIUM 10 MG: 10 TABLET, FILM COATED ORAL at 20:01

## 2018-04-29 RX ADMIN — CEFTRIAXONE SODIUM 1 G: 1 INJECTION, SOLUTION INTRAVENOUS at 16:20

## 2018-04-29 RX ADMIN — DIVALPROEX SODIUM 500 MG: 500 TABLET, FILM COATED, EXTENDED RELEASE ORAL at 09:13

## 2018-04-29 RX ADMIN — HEPARIN SODIUM 5000 UNITS: 5000 INJECTION, SOLUTION INTRAVENOUS; SUBCUTANEOUS at 20:01

## 2018-04-29 RX ADMIN — QUETIAPINE FUMARATE 25 MG: 25 TABLET ORAL at 08:08

## 2018-04-29 RX ADMIN — DIVALPROEX SODIUM 1000 MG: 500 TABLET, FILM COATED, EXTENDED RELEASE ORAL at 20:01

## 2018-04-29 RX ADMIN — FLUTICASONE PROPIONATE 2 SPRAY: 50 SPRAY, METERED NASAL at 08:09

## 2018-04-29 RX ADMIN — PANTOPRAZOLE SODIUM 40 MG: 40 TABLET, DELAYED RELEASE ORAL at 09:13

## 2018-04-29 RX ADMIN — TRAZODONE HYDROCHLORIDE 50 MG: 50 TABLET ORAL at 20:01

## 2018-04-29 RX ADMIN — MORPHINE SULFATE 2 MG: 10 INJECTION INTRAVENOUS at 11:08

## 2018-04-29 RX ADMIN — ESCITALOPRAM OXALATE 10 MG: 20 TABLET, FILM COATED ORAL at 08:08

## 2018-04-29 RX ADMIN — HEPARIN SODIUM 5000 UNITS: 5000 INJECTION, SOLUTION INTRAVENOUS; SUBCUTANEOUS at 08:08

## 2018-04-29 RX ADMIN — BENZTROPINE MESYLATE 2 MG: 1 TABLET ORAL at 08:08

## 2018-04-29 RX ADMIN — SODIUM CHLORIDE 75 ML/HR: 9 INJECTION, SOLUTION INTRAVENOUS at 12:15

## 2018-04-29 RX ADMIN — Medication 250 MG: at 20:00

## 2018-04-29 RX ADMIN — CETIRIZINE HYDROCHLORIDE 10 MG: 10 TABLET, FILM COATED ORAL at 20:00

## 2018-04-29 RX ADMIN — DOCUSATE SODIUM 100 MG: 100 CAPSULE, LIQUID FILLED ORAL at 20:01

## 2018-04-29 RX ADMIN — DOCUSATE SODIUM 100 MG: 100 CAPSULE, LIQUID FILLED ORAL at 08:08

## 2018-04-29 RX ADMIN — POLYETHYLENE GLYCOL (3350) 17 G: 17 POWDER, FOR SOLUTION ORAL at 08:08

## 2018-04-29 RX ADMIN — LORAZEPAM 1 MG: 2 INJECTION INTRAMUSCULAR; INTRAVENOUS at 10:16

## 2018-04-30 LAB
H PYLORI AG STL QL IA: POSITIVE
PROCALCITONIN SERPL-MCNC: <0.05 NG/ML

## 2018-04-30 PROCEDURE — 94799 UNLISTED PULMONARY SVC/PX: CPT

## 2018-04-30 PROCEDURE — 99233 SBSQ HOSP IP/OBS HIGH 50: CPT | Performed by: HOSPITALIST

## 2018-04-30 PROCEDURE — 25010000002 LORAZEPAM PER 2 MG: Performed by: INTERNAL MEDICINE

## 2018-04-30 PROCEDURE — 84145 PROCALCITONIN (PCT): CPT | Performed by: HOSPITALIST

## 2018-04-30 PROCEDURE — 25010000002 HEPARIN (PORCINE) PER 1000 UNITS: Performed by: NURSE PRACTITIONER

## 2018-04-30 PROCEDURE — 25010000002 MORPHINE SULFATE (PF) 2 MG/ML SOLUTION: Performed by: NURSE PRACTITIONER

## 2018-04-30 PROCEDURE — 25010000002 HALOPERIDOL LACTATE PER 5 MG: Performed by: HOSPITALIST

## 2018-04-30 RX ORDER — SENNA AND DOCUSATE SODIUM 50; 8.6 MG/1; MG/1
2 TABLET, FILM COATED ORAL 2 TIMES DAILY
Status: DISCONTINUED | OUTPATIENT
Start: 2018-04-30 | End: 2018-05-01 | Stop reason: HOSPADM

## 2018-04-30 RX ORDER — HALOPERIDOL 5 MG/ML
5 INJECTION INTRAMUSCULAR EVERY 6 HOURS PRN
Status: DISCONTINUED | OUTPATIENT
Start: 2018-04-30 | End: 2018-05-01 | Stop reason: HOSPADM

## 2018-04-30 RX ORDER — FLUCONAZOLE 100 MG/1
150 TABLET ORAL ONCE
Status: COMPLETED | OUTPATIENT
Start: 2018-04-30 | End: 2018-04-30

## 2018-04-30 RX ORDER — QUETIAPINE FUMARATE 25 MG/1
50 TABLET, FILM COATED ORAL NIGHTLY
Status: DISCONTINUED | OUTPATIENT
Start: 2018-04-30 | End: 2018-05-01 | Stop reason: HOSPADM

## 2018-04-30 RX ORDER — OLANZAPINE 10 MG/1
7.5 INJECTION, POWDER, LYOPHILIZED, FOR SOLUTION INTRAMUSCULAR ONCE AS NEEDED
Status: COMPLETED | OUTPATIENT
Start: 2018-04-30 | End: 2018-04-30

## 2018-04-30 RX ORDER — MAGNESIUM CARB/ALUMINUM HYDROX 105-160MG
150 TABLET,CHEWABLE ORAL DAILY
Status: DISCONTINUED | OUTPATIENT
Start: 2018-04-30 | End: 2018-05-01 | Stop reason: HOSPADM

## 2018-04-30 RX ADMIN — FOLIC ACID 1 MG: 1 TABLET ORAL at 08:40

## 2018-04-30 RX ADMIN — FLUTICASONE PROPIONATE 2 SPRAY: 50 SPRAY, METERED NASAL at 08:00

## 2018-04-30 RX ADMIN — BENZTROPINE MESYLATE 2 MG: 1 TABLET ORAL at 08:01

## 2018-04-30 RX ADMIN — Medication 150 ML: at 13:23

## 2018-04-30 RX ADMIN — HEPARIN SODIUM 5000 UNITS: 5000 INJECTION, SOLUTION INTRAVENOUS; SUBCUTANEOUS at 08:00

## 2018-04-30 RX ADMIN — METRONIDAZOLE 500 MG: 500 INJECTION, SOLUTION INTRAVENOUS at 07:46

## 2018-04-30 RX ADMIN — CETIRIZINE HYDROCHLORIDE 10 MG: 10 TABLET, FILM COATED ORAL at 20:02

## 2018-04-30 RX ADMIN — QUETIAPINE FUMARATE 25 MG: 25 TABLET ORAL at 08:00

## 2018-04-30 RX ADMIN — DIVALPROEX SODIUM 1000 MG: 500 TABLET, FILM COATED, EXTENDED RELEASE ORAL at 20:02

## 2018-04-30 RX ADMIN — HALOPERIDOL LACTATE 5 MG: 5 INJECTION, SOLUTION INTRAMUSCULAR at 20:44

## 2018-04-30 RX ADMIN — LORAZEPAM 1 MG: 2 INJECTION INTRAMUSCULAR; INTRAVENOUS at 11:59

## 2018-04-30 RX ADMIN — FLUCONAZOLE 150 MG: 100 TABLET ORAL at 10:26

## 2018-04-30 RX ADMIN — POLYETHYLENE GLYCOL (3350) 17 G: 17 POWDER, FOR SOLUTION ORAL at 08:00

## 2018-04-30 RX ADMIN — MORPHINE SULFATE 2 MG: 10 INJECTION INTRAVENOUS at 01:45

## 2018-04-30 RX ADMIN — Medication 250 MG: at 20:02

## 2018-04-30 RX ADMIN — DIVALPROEX SODIUM 500 MG: 500 TABLET, FILM COATED, EXTENDED RELEASE ORAL at 08:01

## 2018-04-30 RX ADMIN — MORPHINE SULFATE 2 MG: 10 INJECTION INTRAVENOUS at 16:39

## 2018-04-30 RX ADMIN — TRAZODONE HYDROCHLORIDE 50 MG: 50 TABLET ORAL at 20:02

## 2018-04-30 RX ADMIN — FAMOTIDINE 20 MG: 20 TABLET, FILM COATED ORAL at 20:02

## 2018-04-30 RX ADMIN — Medication 2 TABLET: at 10:26

## 2018-04-30 RX ADMIN — SODIUM CHLORIDE 75 ML/HR: 9 INJECTION, SOLUTION INTRAVENOUS at 05:38

## 2018-04-30 RX ADMIN — Medication 250 MG: at 08:00

## 2018-04-30 RX ADMIN — MONTELUKAST SODIUM 10 MG: 10 TABLET, FILM COATED ORAL at 20:02

## 2018-04-30 RX ADMIN — POLYETHYLENE GLYCOL (3350) 17 G: 17 POWDER, FOR SOLUTION ORAL at 20:02

## 2018-04-30 RX ADMIN — LORAZEPAM 1 MG: 2 INJECTION INTRAMUSCULAR; INTRAVENOUS at 19:42

## 2018-04-30 RX ADMIN — ACETAMINOPHEN 650 MG: 325 TABLET ORAL at 19:44

## 2018-04-30 RX ADMIN — OLANZAPINE 7.5 MG: 10 INJECTION, POWDER, FOR SOLUTION INTRAMUSCULAR at 02:11

## 2018-04-30 RX ADMIN — MORPHINE SULFATE 2 MG: 10 INJECTION INTRAVENOUS at 08:39

## 2018-04-30 RX ADMIN — ESCITALOPRAM OXALATE 10 MG: 20 TABLET, FILM COATED ORAL at 08:00

## 2018-04-30 RX ADMIN — PANTOPRAZOLE SODIUM 40 MG: 40 TABLET, DELAYED RELEASE ORAL at 08:39

## 2018-04-30 RX ADMIN — LORAZEPAM 1 MG: 2 INJECTION INTRAMUSCULAR; INTRAVENOUS at 07:40

## 2018-04-30 RX ADMIN — MORPHINE SULFATE 2 MG: 10 INJECTION INTRAVENOUS at 11:38

## 2018-04-30 RX ADMIN — BENZTROPINE MESYLATE 2 MG: 1 TABLET ORAL at 20:02

## 2018-04-30 RX ADMIN — QUETIAPINE FUMARATE 50 MG: 25 TABLET ORAL at 20:02

## 2018-04-30 RX ADMIN — Medication 2 TABLET: at 20:02

## 2018-04-30 RX ADMIN — DOCUSATE SODIUM 100 MG: 100 CAPSULE, LIQUID FILLED ORAL at 08:00

## 2018-04-30 RX ADMIN — HEPARIN SODIUM 5000 UNITS: 5000 INJECTION, SOLUTION INTRAVENOUS; SUBCUTANEOUS at 20:02

## 2018-05-01 ENCOUNTER — APPOINTMENT (OUTPATIENT)
Dept: GENERAL RADIOLOGY | Facility: HOSPITAL | Age: 28
End: 2018-05-01

## 2018-05-01 ENCOUNTER — HOSPITAL ENCOUNTER (INPATIENT)
Facility: HOSPITAL | Age: 28
LOS: 14 days | Discharge: PSYCHIATRIC OR SUBSTANCE USE FACILITY | End: 2018-05-16
Attending: EMERGENCY MEDICINE | Admitting: HOSPITALIST

## 2018-05-01 VITALS
HEIGHT: 63 IN | RESPIRATION RATE: 16 BRPM | DIASTOLIC BLOOD PRESSURE: 74 MMHG | HEART RATE: 118 BPM | TEMPERATURE: 98.6 F | WEIGHT: 255.6 LBS | OXYGEN SATURATION: 97 % | SYSTOLIC BLOOD PRESSURE: 109 MMHG | BODY MASS INDEX: 45.29 KG/M2

## 2018-05-01 DIAGNOSIS — Z87.39 HISTORY OF RHABDOMYOLYSIS: ICD-10-CM

## 2018-05-01 DIAGNOSIS — Z74.09 IMPAIRED FUNCTIONAL MOBILITY, BALANCE, GAIT, AND ENDURANCE: ICD-10-CM

## 2018-05-01 DIAGNOSIS — Z74.09 IMPAIRED MOBILITY AND ADLS: ICD-10-CM

## 2018-05-01 DIAGNOSIS — D72.829 LEUKOCYTOSIS, UNSPECIFIED TYPE: ICD-10-CM

## 2018-05-01 DIAGNOSIS — F43.29 ADJUSTMENT DISORDER WITH OTHER SYMPTOM: Chronic | ICD-10-CM

## 2018-05-01 DIAGNOSIS — Z78.9 IMPAIRED MOBILITY AND ADLS: ICD-10-CM

## 2018-05-01 DIAGNOSIS — R13.12 OROPHARYNGEAL DYSPHAGIA: ICD-10-CM

## 2018-05-01 DIAGNOSIS — Z86.59 HISTORY OF SUICIDAL IDEATION: ICD-10-CM

## 2018-05-01 DIAGNOSIS — T79.6XXS TRAUMATIC RHABDOMYOLYSIS, SEQUELA: Primary | ICD-10-CM

## 2018-05-01 DIAGNOSIS — D50.8 OTHER IRON DEFICIENCY ANEMIA: ICD-10-CM

## 2018-05-01 DIAGNOSIS — R53.1 GENERALIZED WEAKNESS: ICD-10-CM

## 2018-05-01 DIAGNOSIS — Z86.19 HISTORY OF SEPSIS: ICD-10-CM

## 2018-05-01 LAB
ALBUMIN SERPL-MCNC: 2.8 G/DL (ref 3.2–4.8)
ANION GAP SERPL CALCULATED.3IONS-SCNC: 4 MMOL/L (ref 3–11)
BUN BLD-MCNC: 8 MG/DL (ref 9–23)
BUN/CREAT SERPL: 13.3 (ref 7–25)
CALCIUM SPEC-SCNC: 8.8 MG/DL (ref 8.7–10.4)
CHLORIDE SERPL-SCNC: 101 MMOL/L (ref 99–109)
CK SERPL-CCNC: 853 U/L (ref 26–174)
CO2 SERPL-SCNC: 31 MMOL/L (ref 20–31)
CREAT BLD-MCNC: 0.6 MG/DL (ref 0.6–1.3)
DEPRECATED RDW RBC AUTO: 50.4 FL (ref 37–54)
DEVELOPER EXPIRATION DATE: NORMAL
DEVELOPER LOT NUMBER: NORMAL
ERYTHROCYTE [DISTWIDTH] IN BLOOD BY AUTOMATED COUNT: 16.7 % (ref 11.3–14.5)
EXPIRATION DATE: NORMAL
FECAL OCCULT BLOOD SCREEN, POC: NEGATIVE
GFR SERPL CREATININE-BSD FRML MDRD: 121 ML/MIN/1.73
GLUCOSE BLD-MCNC: 88 MG/DL (ref 70–100)
HCT VFR BLD AUTO: 27.9 % (ref 34.5–44)
HGB BLD-MCNC: 9 G/DL (ref 11.5–15.5)
Lab: NORMAL
MCH RBC QN AUTO: 27.3 PG (ref 27–31)
MCHC RBC AUTO-ENTMCNC: 32.3 G/DL (ref 32–36)
MCV RBC AUTO: 84.5 FL (ref 80–99)
NEGATIVE CONTROL: NEGATIVE
PHOSPHATE SERPL-MCNC: 4.4 MG/DL (ref 2.4–5.1)
PLATELET # BLD AUTO: 415 10*3/MM3 (ref 150–450)
PMV BLD AUTO: 8.6 FL (ref 6–12)
POSITIVE CONTROL: POSITIVE
POTASSIUM BLD-SCNC: 4.5 MMOL/L (ref 3.5–5.5)
RBC # BLD AUTO: 3.3 10*6/MM3 (ref 3.89–5.14)
SODIUM BLD-SCNC: 136 MMOL/L (ref 132–146)
WBC NRBC COR # BLD: 15.06 10*3/MM3 (ref 3.5–10.8)

## 2018-05-01 PROCEDURE — 97116 GAIT TRAINING THERAPY: CPT

## 2018-05-01 PROCEDURE — 71045 X-RAY EXAM CHEST 1 VIEW: CPT

## 2018-05-01 PROCEDURE — 82270 OCCULT BLOOD FECES: CPT | Performed by: NURSE PRACTITIONER

## 2018-05-01 PROCEDURE — 99285 EMERGENCY DEPT VISIT HI MDM: CPT

## 2018-05-01 PROCEDURE — 25010000002 HEPARIN (PORCINE) PER 1000 UNITS: Performed by: NURSE PRACTITIONER

## 2018-05-01 PROCEDURE — 99239 HOSP IP/OBS DSCHRG MGMT >30: CPT | Performed by: INTERNAL MEDICINE

## 2018-05-01 PROCEDURE — 81001 URINALYSIS AUTO W/SCOPE: CPT | Performed by: NURSE PRACTITIONER

## 2018-05-01 PROCEDURE — 97110 THERAPEUTIC EXERCISES: CPT

## 2018-05-01 PROCEDURE — 25010000002 LORAZEPAM PER 2 MG: Performed by: INTERNAL MEDICINE

## 2018-05-01 PROCEDURE — 82550 ASSAY OF CK (CPK): CPT | Performed by: HOSPITALIST

## 2018-05-01 PROCEDURE — 97530 THERAPEUTIC ACTIVITIES: CPT

## 2018-05-01 PROCEDURE — 93005 ELECTROCARDIOGRAM TRACING: CPT | Performed by: NURSE PRACTITIONER

## 2018-05-01 PROCEDURE — 80069 RENAL FUNCTION PANEL: CPT | Performed by: HOSPITALIST

## 2018-05-01 PROCEDURE — 25010000002 HALOPERIDOL LACTATE PER 5 MG: Performed by: HOSPITALIST

## 2018-05-01 PROCEDURE — 85027 COMPLETE CBC AUTOMATED: CPT | Performed by: HOSPITALIST

## 2018-05-01 RX ORDER — SODIUM CHLORIDE 0.9 % (FLUSH) 0.9 %
10 SYRINGE (ML) INJECTION AS NEEDED
Status: DISCONTINUED | OUTPATIENT
Start: 2018-05-01 | End: 2018-05-16 | Stop reason: HOSPADM

## 2018-05-01 RX ADMIN — LORAZEPAM 1 MG: 2 INJECTION INTRAMUSCULAR; INTRAVENOUS at 00:46

## 2018-05-01 RX ADMIN — FLUTICASONE PROPIONATE 2 SPRAY: 50 SPRAY, METERED NASAL at 09:25

## 2018-05-01 RX ADMIN — Medication 250 MG: at 09:24

## 2018-05-01 RX ADMIN — DIVALPROEX SODIUM 500 MG: 500 TABLET, FILM COATED, EXTENDED RELEASE ORAL at 09:25

## 2018-05-01 RX ADMIN — ESCITALOPRAM OXALATE 10 MG: 20 TABLET, FILM COATED ORAL at 09:25

## 2018-05-01 RX ADMIN — BENZTROPINE MESYLATE 2 MG: 1 TABLET ORAL at 09:25

## 2018-05-01 RX ADMIN — HEPARIN SODIUM 5000 UNITS: 5000 INJECTION, SOLUTION INTRAVENOUS; SUBCUTANEOUS at 09:25

## 2018-05-01 RX ADMIN — POLYETHYLENE GLYCOL (3350) 17 G: 17 POWDER, FOR SOLUTION ORAL at 09:25

## 2018-05-01 RX ADMIN — ACETAMINOPHEN 650 MG: 325 TABLET ORAL at 11:38

## 2018-05-01 RX ADMIN — ACETAMINOPHEN 650 MG: 325 TABLET ORAL at 17:29

## 2018-05-01 RX ADMIN — FOLIC ACID 1 MG: 1 TABLET ORAL at 09:25

## 2018-05-01 RX ADMIN — LORAZEPAM 1 MG: 2 INJECTION INTRAMUSCULAR; INTRAVENOUS at 07:45

## 2018-05-01 RX ADMIN — Medication 2 TABLET: at 09:25

## 2018-05-01 RX ADMIN — HALOPERIDOL LACTATE 5 MG: 5 INJECTION, SOLUTION INTRAMUSCULAR at 09:07

## 2018-05-01 NOTE — THERAPY TREATMENT NOTE
Acute Care - Occupational Therapy Treatment Note  [facility]     Patient Name: Fartun Amin  : 1990  MRN: [id number]  Today's Date: 2018  Onset of Illness/Injury or Date of Surgery: 18  Date of Referral to OT: 18  Referring Physician: DO Radha    Admit Date: 2018       ICD-10-CM ICD-9-CM   1. Acute UTI N39.0 599.0   2. Acute colitis K52.9 558.9   3. LORE (acute kidney injury) N17.9 584.9   4. Dehydration E86.0 276.51   5. Altered mental status, unspecified altered mental status type R41.82 780.97   6. Impaired functional mobility, balance, gait, and endurance Z74.09 V49.89   7. Impaired mobility and ADLs Z74.09 799.89     Patient Active Problem List   Diagnosis   • Abnormal drug screen   • LORE (acute kidney injury)   • Altered mental status   • Sepsis   • UTI (urinary tract infection)   • HTN (hypertension)   • Adjustment disorder   • Personality disorder   • PTSD (post-traumatic stress disorder)   • PCOS (polycystic ovarian syndrome)   • Intermittent explosive disorder   • Suicide attempt by drug ingestion   • Hyperkalemia   • GERD (gastroesophageal reflux disease)   • Asthma   • QT prolongation   • Non-traumatic rhabdomyolysis     Past Medical History:   Diagnosis Date   • Abnormal drug screen 2018   • ADHD (attention deficit hyperactivity disorder)    • Adjustment disorder    • LORE (acute kidney injury) 2018   • Altered mental status 2018   • Asthma    • GERD (gastroesophageal reflux disease)    • Hearing impairment    • HTN (hypertension) 2018   • Hyperkalemia 2018   • Hypertension    • Intermittent explosive disorder    • Mood disorder    • Obesity    • PCOS (polycystic ovarian syndrome)    • Personality disorder    • PTSD (post-traumatic stress disorder)    • QT prolongation 2018   • Sepsis 2018   • Suicide attempt by drug ingestion 2018   • UTI (urinary tract infection) 2018     History reviewed. No pertinent surgical  history.    Therapy Treatment          Rehabilitation Treatment Summary     Row Name 05/01/18 1020             Treatment Time/Intention    Discipline occupational therapist  -TB      Document Type therapy note (daily note)  -TB      Subjective Information complains of;pain  -TB      Mode of Treatment individual therapy  -TB      Patient/Family Observations Pt rec'vd in bed/fowlers, room air, IV heplocked; sitter present  -TB      Care Plan Review risks/benefits reviewed;care plan/treatment goals reviewed  -TB      Patient Effort good  -TB      Comment Increased pain R UE/LE with movement  -TB      Existing Precautions/Restrictions fall   exit alarms; active A/V hallucinations  -TB      Treatment Considerations/Comments Pt observed to move R UE spontaneously with effort/grimacing; makes no attempt to integrate R UE into midline  bimanual tasks  -TB      Recorded by [TB] Cindi Florence, OT 05/01/18 1115 05/01/18 1115      Row Name 05/01/18 1020             Vital Signs    Pre Systolic BP Rehab 117  -TB      Pre Treatment Diastolic BP 68  -TB      O2 Delivery Pre Treatment room air  -TB      Pre Patient Position Supine  -TB      Intra Patient Position Standing  -TB      Post Patient Position Sitting  -TB      Recorded by [TB] Cindi Florence, OT 05/01/18 1115 05/01/18 1115      Row Name 05/01/18 1020             Cognitive Assessment/Intervention- PT/OT    Affect/Mental Status (Cognitive) anxious;flat/blunted affect  -TB      Orientation Status (Cognition) oriented to;person;place;situation  -TB      Follows Commands (Cognition) follows one step commands;75-90% accuracy  -TB      Safety Deficit (Cognitive) insight into deficits/self awareness  -TB      Personal Safety Interventions fall prevention program maintained   exit alarms; sitter  -TB      Recorded by [TB] Cindi Florence, OT 05/01/18 1115 05/01/18 1115      Row Name 05/01/18 1020             Safety Issues, Functional Mobility    Safety Issues  Affecting Function (Mobility) insight into deficits/self awareness  -TB      Impairments Affecting Function (Mobility) balance;endurance/activity tolerance;pain;strength  -TB      Recorded by [TB] Cindi Florence, MATTI 05/01/18 1115 05/01/18 1115      Row Name 05/01/18 1020             Bed Mobility Assessment/Treatment    Bed Mobility Assessment/Treatment rolling right;scooting/bridging;supine-sit  -TB      Rolling Right [place] (Bed Mobility) moderate assist (50% patient effort);verbal cues  -TB      Scooting/Bridging [place] (Bed Mobility) moderate assist (50% patient effort);verbal cues  -TB      Supine-Sit [place] (Bed Mobility) moderate assist (50% patient effort);verbal cues  -TB      Bed Mobility, Safety Issues decreased use of arms for pushing/pulling;decreased use of legs for bridging/pushing  -TB      Assistive Device (Bed Mobility) bed rails;head of bed elevated  -TB      Comment (Bed Mobility) encouragement, cues to sequence, increased time  -TB      Recorded by [TB] Cindi Florence, MATTI 05/01/18 1115 05/01/18 1115      Row Name 05/01/18 1020             Functional Mobility    Functional Mobility- Ind. Level minimum assist (75% patient effort);verbal cues required;1 person + 1 person to manage equipment  -TB      Functional Mobility- Device standard walker  -TB      Functional Mobility-Distance (Feet) 8  -TB      Functional Mobility- Safety Issues step length decreased;weight-shifting ability decreased;sequencing ability decreased;balance decreased during turns  -TB      Functional Mobility- Comment encouragment, cues to sequence, assist to advance SW  -TB      Recorded by [TB] Cindi Florence, MATTI 05/01/18 1115 05/01/18 1115      Row Name 05/01/18 1020             Transfer Assessment/Treatment    Transfer Assessment/Treatment sit-stand transfer;stand-sit transfer;bed-chair transfer  -TB      Comment (Transfers) cues for hand placement; encouragement  -TB      Bed-Chair  [place] (Transfers) minimum assist (75% patient effort);2 person assist  -TB      Assistive Device (Bed-Chair Transfers) walker, standard  -TB      Sit-Stand [place] (Transfers) minimum assist (75% patient effort);2 person assist  -TB      Stand-Sit [place] (Transfers) minimum assist (75% patient effort);verbal cues  -TB      Recorded by [TB] Cindi Florence, OT 05/01/18 1115 05/01/18 1115      Row Name 05/01/18 1020             Sit-Stand Transfer    Assistive Device (Sit-Stand Transfers) walker, standard  -TB      Recorded by [TB] Cindi Florence, OT 05/01/18 1115 05/01/18 1115      Row Name 05/01/18 1020             Stand-Sit Transfer    Assistive Device (Stand-Sit Transfers) walker, standard  -TB      Recorded by [TB] Cindi Florence, OT 05/01/18 1115 05/01/18 1115      Row Name 05/01/18 1020             ADL Assessment/Intervention    BADL Assessment/Intervention upper body dressing;lower body dressing;grooming  -TB      Recorded by [TB] Cindi Florence, OT 05/01/18 1115 05/01/18 1115      Row Name 05/01/18 1020             Upper Body Dressing Assessment/Training    Upper Body Dressing [place] Level don;pajama/robe  -TB      Assistive Devices (Upper Body Dressing) one hand technique  -TB      Upper Body Dressing Position edge of bed sitting  -TB      Comment (Upper Body Dressing) Education reinforced for R UE tucker-dressing  -TB      Recorded by [TB] Cindi Florence, OT 05/01/18 1115 05/01/18 1115      Row Name 05/01/18 1020             Lower Body Dressing Assessment/Training    Lower Body Dressing [place] Level don;socks;dependent (less than 25% patient effort)  -TB      Lower Body Dressing Position edge of bed sitting  -TB      Comment (Lower Body Dressing) Pt reports, staff completes socks/shoes at group home  -TB      Recorded by [TB] Cindi Florence, OT 05/01/18 1115 05/01/18 1115      Row Name 05/01/18 1020             Grooming Assessment/Training     [place] Level (Grooming) hair care, combing/brushing;oral care regimen;minimum assist (75% patient effort)  -TB      Grooming Position supported sitting  -TB      Comment (Grooming) Setup to comb hair; Min A for oral care as pt declines to integrate R UE in midline task  -TB      Recorded by [TB] Cindi Florence, MATTI 05/01/18 1115 05/01/18 1115      Row Name 05/01/18 1020             Therapeutic Exercise    Upper Extremity Range of Motion (Therapeutic Exercise) shoulder flexion/extension, bilateral;shoulder abduction/adduction, bilateral;shoulder horizontal abduction/adduction, bilateral;elbow flexion/extension, bilateral  -TB      Hand (Therapeutic Exercise) hand , bilateral  -TB      Exercise Type (Therapeutic Exercise) AROM (active range of motion);AAROM (active assistive range of motion)   hand  AROM x10 reps  -TB      Position (Therapeutic Exercise) seated  -TB      Sets/Reps (Therapeutic Exercise) x10  -TB      Recorded by [TB] Cindi Florence OT 05/01/18 1115 05/01/18 1115      Row Name 05/01/18 1020             Static Sitting Balance    Level of [place] (Unsupported Sitting, Static Balance) supervision  -TB      Recorded by [TB] Cindi Florence OT 05/01/18 1115 05/01/18 1115      Row Name 05/01/18 1020             Static Standing Balance    Level of [place] (Supported Standing, Static Balance) minimal assist, 75% patient effort  -TB      Time Able to Maintain Position (Supported Standing, Static Balance) 1 to 2 minutes  -TB      Recorded by [TB] Cindi Florence OT 05/01/18 1115 05/01/18 1115      Row Name 05/01/18 1020             Positioning and Restraints    Pre-Treatment Position in bed  -TB      Post Treatment Position chair  -TB      In Chair reclined;call light within reach;encouraged to call for assist;with nsg   Sitter present  -TB      Recorded by [TB] Cindi Florence OT 05/01/18 1115 05/01/18 1115      Row Name 05/01/18 1020              Pain Scale: Numbers Pre/Post-Treatment    Pain Location - Side Right  -TB      Pain Location - Orientation generalized   UE, flank, hip  -TB      Pain Intervention(s) Ambulation/increased activity;Repositioned  -TB      Recorded by [TB] Cindi Florence, OT 05/01/18 1115 05/01/18 1115      Row Name 05/01/18 1020             Pain Scale: FACES Pre/Post-Treatment    Pain: FACES Scale, Pretreatment 2-->hurts little bit  -TB      Pain: FACES Scale, Post-Treatment 2-->hurts little bit   4/10 with mobility  -TB      Pre/Post Treatment Pain Comment Increased pain with mobility; pt resting comfortably at end of session  -TB      Recorded by [TB] Cindi Florence, OT 05/01/18 1115 05/01/18 1115      Row Name 05/01/18 1020             Coping    Observed Emotional State calm;cooperative  -TB      Verbalized Emotional State anxiety  -TB      Recorded by [TB] Cindi Florence OT 05/01/18 1115 05/01/18 1115      Row Name 05/01/18 1020             Plan of Care Review    Plan of Care Reviewed With patient  -TB      Recorded by [TB] Cindi Florence, OT 05/01/18 1115 05/01/18 1115      Row Name 05/01/18 1020             Outcome Summary/Treatment Plan (OT)    Daily Summary of Progress (OT) progress towards functional goals is fair  -TB      Barriers to Overall Progress (OT) pain, self-limiting behaviors  -TB2      Plan for Continued Treatment (OT) per POC  -TB2      Anticipated Discharge Disposition (OT) psychiatric hospital or [facility]  -TB2      Recorded by [TB] Cindi Florence, OT 05/01/18 1116 05/01/18 1116  [TB2] iCndi Florence, OT 05/01/18 1115 05/01/18 1115        User Key  (r) = Recorded By, (t) = Taken By, (c) = Cosigned By    Initials Name Effective Dates Discipline    TB Cindi Florence OT 06/22/15 -  OT               Occupational Therapy Education     Title: PT OT SLP Therapies (Active)     Topic: Occupational Therapy (Active)     Point: ADL training (Done)      Description: Instruct learner(s) on proper safety adaptation and remediation techniques during self care or transfers.   Instruct in proper use of assistive devices.   Learning Progress Summary     Learner Status Readiness Method Response Comment Documented by    Patient Done BILL Rodriguez TB VU, NR Education reinforced for benefits of OOB activity/therapy, role of OT and HEP  05/01/18 1116     Active Nonacceptance E,TB NR,[facility] 04/30/18 2019     Done BILL Ashraf NR  AR 04/28/18 1056          Point: Home exercise program (Active)     Description: Instruct learner(s) on appropriate technique for monitoring, assisting and/or progressing therapeutic exercises/activities.   Learning Progress Summary     Learner Status Readiness Method Response Comment Documented by    Patient Active Nonacceptance E,TB NR,[facility] 04/30/18 2019     Done BILL Ashraf NR  AR 04/28/18 1056          Point: Precautions (Active)     Description: Instruct learner(s) on prescribed precautions during self-care and functional transfers.   Learning Progress Summary     Learner Status Readiness Method Response Comment Documented by    Patient Active Nonacceptance E,TB NR,[facility] 04/30/18 2019     Done BILL Ashraf NR  AR 04/28/18 1056          Point: Body mechanics (Active)     Description: Instruct learner(s) on proper positioning and spine alignment during self-care, functional mobility activities and/or exercises.   Learning Progress Summary     Learner Status Readiness Method Response Comment Documented by    Patient Active Nonacceptance E,TB NR,[facility] 04/30/18 2019     Done BILL Ashraf NR  AR 04/28/18 1056                      User Key     Initials Effective Dates Name Provider Type Discipline     06/22/15 -  Cindi Florence, OT Occupational Therapist OT    AR 06/22/15 -  Ayana Romo, OT Occupational Therapist OT     06/16/16 -  Evelin Cedeño RN Registered Nurse Nurse                OT Recommendation and Plan  Outcome  Summary/Treatment Plan (OT)  Daily Summary of Progress (OT): progress towards functional goals is fair  Barriers to Overall Progress (OT): pain, self-limiting behaviors  Plan for Continued Treatment (OT): per POC  Anticipated Discharge Disposition (OT): psychiatric hospital or unit ([facility])  Daily Summary of Progress (OT): progress towards functional goals is fair  Plan of Care Review  Plan of Care Reviewed With: patient  Plan of Care Reviewed With: patient  Outcome Summary: Pt agreeable to skilled OT. Pt presents with ongoing R side/UE/LE pain limiting mobility and ADL participation. Mod A and increased time for bed mobility. Min A x2 STS and Bed to Chair. Mod ADL dressing/grooming tasks. OT to follow.         Outcome Measures     Row Name 05/01/18 1020             How much help from another is currently needed...    Putting on and taking off regular lower body clothing? 2  -TB      Bathing (including washing, rinsing, and drying) 2  -TB      Toileting (which includes using toilet bed pan or urinal) 2  -TB      Putting on and taking off regular upper body clothing 2  -TB      Taking care of personal grooming (such as brushing teeth) 3  -TB      Eating meals 3  -TB      Score 14  -TB         Functional Assessment    Outcome Measure Options AM-PAC 6 Clicks Daily Activity (OT)  -TB        User Key  (r) = Recorded By, (t) = Taken By, (c) = Cosigned By    Initials Name Provider Type    GABE Florence OT Occupational Therapist           Time Calculation:         Time Calculation- OT     Row Name 05/01/18 1119             Time Calculation- OT    OT Start Time 1020  -TB      Total Timed Code Minutes- OT 23 minute(s)  -TB      OT Received On 05/01/18  -TB      OT Goal Re-Cert Due Date 05/08/18  -TB        User Key  (r) = Recorded By, (t) = Taken By, (c) = Cosigned By    Initials Name Provider Type    GABE Florence OT Occupational Therapist           Therapy Charges for Today     Code  Description Service Date Service Provider Modifiers Qty    [id number]  OT THERAPEUTIC ACT EA 15 MIN 5/1/2018 Cindi Florence OT GO 2               Cindi Florence OT  5/1/2018

## 2018-05-01 NOTE — DISCHARGE SUMMARY
[facility]  DISCHARGE SUMMARY    Patient Name: Fartun Amin  : 1990  MRN: [id number]    Date of Admission: 2018  Date of Discharge:  18  Primary Care Physician: ADAM Harkins    Consults     Date and Time Order Name Status Description    2018 1004 Inpatient Orthopedic Surgery Consult Completed     2018 1349 Inpatient Gastroenterology Consult Completed     2018 0956 Inpatient Neurology Consult Completed         Hospital Course     Presenting Problem:   Altered mental status [R41.82]    Active Hospital Problems (** Indicates Principal Problem)    Diagnosis Date Noted   • **Altered mental status [R41.82] 2018   • Non-traumatic rhabdomyolysis [M62.82] 2018   • Abnormal drug screen [R89.2] 2018   • LORE (acute kidney injury) [N17.9] 2018   • Sepsis [A41.9] 2018   • UTI (urinary tract infection) [N39.0] 2018   • HTN (hypertension) [I10] 2018   • Adjustment disorder [F43.20] 2018   • Personality disorder [F60.9] 2018   • PTSD (post-traumatic stress disorder) [F43.10] 2018   • PCOS (polycystic ovarian syndrome) [E28.2] 2018   • Intermittent explosive disorder [F63.81] 2018   • Suicide attempt by drug ingestion [T50.902A] 2018   • Hyperkalemia [E87.5] 2018   • GERD (gastroesophageal reflux disease) [K21.9] 2018   • Asthma [J45.909] 2018   • QT prolongation [R94.31] 2018      Resolved Hospital Problems    Diagnosis Date Noted Date Resolved   No resolved problems to display.          Hospital Course:  Fartun Amin is a 27 y.o. female with a significant history of severe psychiatric disease who presents to the emergency room with an unclear history.  She lives in a psychiatric group home.  Apparently she took some pills but unknown quantity or substance from a friend in order to harm herself.  Also stated she ate glass.  She admitted to suicidal  ideation.  Mental status probably worse than baseline, though again is very difficult to obtain any sort of history.  On arrival here she had a unremarkable neurologic workup including MRI of her brain and EEG.  She was found to have an Escherichia coli UTI which was appropriately treated with antibiotics while here.  Hemoglobin is low around 8 with no prior baseline.  No evidence of ongoing bleeding.  Was seen by GI due to concern for consumed a glass but no evidence of bleeding and intervention was deferred.  Acute renal failure resolved with fluids.  Also have evidence of significant rhabdomyolysis which again resolved with aggressive hydration.  At one point she had right sided weakness but exam was inconsistent.  Neurology saw him felt this is likely conversion disorder versus related to psychiatric disease.  At this time she is medically stable and ready to leave the hospital.  However she continues to have active hallucinations.  Denied any suicidal ideation to me this morning.   has obtained for involuntary admission and to [facility].  Transfer arrangements made for later this afternoon.  There is no change to her chronic medications at the time of discharge.         Day of Discharge     HPI:   Patient unable to provide history.  RN/sitter at bedside.  Intermittently takes pills.  Says she was able to stand on her own.    Review of Systems  Unable to obtain from patient    Vital Signs:   Temp:  [98 °F (36.7 °C)-98.6 °F (37 °C)] 98.6 °F (37 °C)  Heart Rate:  [] 112  Resp:  [16-18] 16  BP: ()/(69-86) 117/73     Physical Exam:  Constitutional: No acute distress, awake, alert  HENT: NCAT, mucous membranes moist  Respiratory: Clear to auscultation bilaterally, respiratory effort normal   Cardiovascular: RRR, no murmurs, rubs, or gallops  Gastrointestinal: Positive bowel sounds, soft, nontender, nondistended  Musculoskeletal: trace bilateral ankle edema  Psychiatric: off affect, + AV  hallucination, denies SI/HI  Neurologic: awake, follows commands, right weakness is variable  Skin: No rashes      Pertinent  and/or Most Recent Results       Results from last 7 days  Lab Units 05/01/18  0644 04/29/18  0603 04/28/18  0630 04/28/18  0626 04/27/18  0758 04/26/18  0448 04/25/18  0732 04/24/18  1344   WBC 10*3/mm3 15.06* 11.73* 12.48*  --  14.92* 12.24* 12.14*  --    HEMOGLOBIN g/dL 9.0* 8.8* 9.2*  --  7.9* 8.1* 9.1* 9.5*   HEMATOCRIT % 27.9* 27.2* 28.5*  --  25.7* 25.2* 28.5* 31.1*   PLATELETS 10*3/mm3 415 346 290  --  221 209 243  --    SODIUM mmol/L 136 136  --  139 139 138 138  --    POTASSIUM mmol/L 4.5 3.9  --  3.9 4.1 4.0 4.2  --    CHLORIDE mmol/L 101 102  --  107 110* 107 108  --    CO2 mmol/L 31.0 30.0  --  29.0 23.0 24.0 24.0  --    BUN mg/dL 8* 11  --  8* 7* 6* 9  --    CREATININE mg/dL 0.60 0.50*  --  0.50* 0.50* 0.50* 0.60  --    GLUCOSE mg/dL 88 110*  --  89 111* 85 86  --    CALCIUM mg/dL 8.8 8.9  --  7.8* 6.6* 7.4* 7.3*  --        Results from last 7 days  Lab Units 04/28/18  0626 04/27/18  0758 04/26/18  0448 04/25/18  0732   BILIRUBIN mg/dL 0.2* 0.2* 0.2* 0.2*   ALK PHOS U/L 115* 107* 105* 74   ALT (SGPT) U/L 156* 170* 164* 204*   AST (SGOT) U/L 152* 212* 289* 430*           Invalid input(s): TG, LDLCALC, LDLREALC      Brief Urine Lab Results  (Last result in the past 365 days)      Color   Clarity   Blood   Leuk Est   Nitrite   Protein   CREAT   Urine HCG        04/27/18 1319 Yellow Clear Negative Negative Negative Negative               Microbiology Results Abnormal     Procedure Component Value - Date/Time    Blood Culture - Blood, [[id number]]  (Normal) Collected:  04/23/18 1840    Lab Status:  Final result Specimen:  Blood from Arm, Right Updated:  04/28/18 2000     Blood Culture No growth at 5 days    Blood Culture - Blood, [[id number]]  (Normal) Collected:  04/23/18 1845    Lab Status:  Final result Specimen:  Blood from Arm, Left Updated:  04/28/18 2000     Blood Culture No  growth at 5 days    Stool Culture With Yersinia - Stool, Per Rectum [[id number]]  (Normal) Collected:  04/25/18 2211    Lab Status:  Final result Specimen:  Stool from Per Rectum Updated:  04/28/18 1032     Stool Cx w/ Yersinia No Salmonella, Shigella, Campylobacter, E. coli O157, Vibrio, or Yersinia isolated    Chlamydia trachomatis, Neisseria gonorrhoeae, Trichomonas vaginalis, PCR - Swab, Vagina [[id number]] Collected:  04/26/18 1138    Lab Status:  Final result Specimen:  Swab from Vagina Updated:  04/28/18 0615     Chlamydia trachomatis, NITA Negative     Gonococcus by NITA Negative     Trichomonas vaginosis Negative    Narrative:       Performed at:  [address]  [id number]  : Suzy Sarmiento MD, Phone:  [phone]    Urine Culture - Urine, Urine, Clean Catch [[id number]]  (Abnormal)  (Susceptibility) Collected:  04/23/18 1648    Lab Status:  Final result Specimen:  Urine from Urine, Clean Catch Updated:  04/26/18 1145     Urine Culture --      >100,000 CFU/mL Escherichia coli (A)    Susceptibility      Escherichia coli     DENIS     Ampicillin >16 ug/ml Resistant     Ampicillin + Sulbactam >16/8 ug/ml Resistant     Aztreonam <=8 ug/ml Susceptible     Cefepime <=8 ug/ml Susceptible     Cefotaxime <=2 ug/ml Susceptible     Ceftriaxone <=8 ug/ml Susceptible     Cefuroxime sodium >16 ug/ml Resistant     Cephalothin >16 ug/ml Resistant     Ciprofloxacin >2 ug/ml Resistant     Ertapenem <=1 ug/ml Susceptible     Gentamicin <=4 ug/ml Susceptible     Levofloxacin >4 ug/ml Resistant     Meropenem <=1 ug/ml Susceptible     Nitrofurantoin <=32 ug/ml Susceptible     Piperacillin + Tazobactam <=16 ug/ml Susceptible     Tetracycline 8 ug/ml Intermediate     Tobramycin <=4 ug/ml Susceptible     Trimethoprim + Sulfamethoxazole <=2/38 ug/ml Susceptible                          Imaging Results (all)     Procedure Component Value Units Date/Time    MRI Lumbar Spine Without Contrast  [[id number]] Collected:  04/28/18 1345     Updated:  04/28/18 3893    Narrative:       EXAMINATION: MRI THORACIC SPINE WO CONTRAST, MRI LUMBAR SPINE WO  CONTRAST - 04/28/2018     INDICATION: N39.0-Urinary tract infection, site not specified;  K52.9-Noninfective gastroenteritis and colitis, unspecified; N17.9-Acute  kidney failure, unspecified; E86.0-Dehydration; R41.82-Altered mental  status, unspecified; Z74.09-Other reduced mobility.      TECHNIQUE: Multiplanar MRI of the thoracic and lumbar spine without  intravenous contrast administration.     COMPARISON: None.     FINDINGS:      THORACIC SPINE: Sagittal datasets reveal normal alignment without focal  subluxation. Preservation of vertebral body heights and intervertebral  disc height spaces which are well hydrated. Visualized spinal cord  without intrinsic signal abnormality. Axial datasets evaluation without  paraspinal hematoma or soft tissue abnormality of concern. No  significant degenerative changes; specifically no focal severe spinal  canal or neuroforaminal stenosis.     LUMBAR SPINE: Sagittal datasets reveal normal alignment of the vertebral  segments without vertebral body height loss. Intervertebral disc height  spaces are fairly well-preserved with disc hydration in normal limits.  Distal spinal cord, conus and cauda equina without contour irregularity.  Axial dataset evaluation without paraspinal hematoma or soft tissue  abnormality of concern demonstrating minimal motion artifact. At the  L4-L5 level there is moderate circumferential disc bulge producing mild  spinal canal stenosis without significant neuroforaminal stenosis. At  the L5-S1 level there is circumferential disc bulge without significant  spinal canal or neuroforaminal stenosis.       Impression:       No evidence for pathologic bone marrow signal abnormality or  significant spinal canal/neuroforaminal stenosis of the thoracic or  lumbar spine. Normal alignment with without evidence  for discrete  fracture.      DICTATED:     04/28/2018  EDITED/ls :     04/28/2018      This report was finalized on 4/28/2018 5:21 PM by Dr. Wilner Villar.       MRI Thoracic Spine Without Contrast [[id number]] Collected:  04/28/18 1345     Updated:  04/28/18 1723    Narrative:       EXAMINATION: MRI THORACIC SPINE WO CONTRAST, MRI LUMBAR SPINE WO  CONTRAST - 04/28/2018     INDICATION: N39.0-Urinary tract infection, site not specified;  K52.9-Noninfective gastroenteritis and colitis, unspecified; N17.9-Acute  kidney failure, unspecified; E86.0-Dehydration; R41.82-Altered mental  status, unspecified; Z74.09-Other reduced mobility.      TECHNIQUE: Multiplanar MRI of the thoracic and lumbar spine without  intravenous contrast administration.     COMPARISON: None.     FINDINGS:      THORACIC SPINE: Sagittal datasets reveal normal alignment without focal  subluxation. Preservation of vertebral body heights and intervertebral  disc height spaces which are well hydrated. Visualized spinal cord  without intrinsic signal abnormality. Axial datasets evaluation without  paraspinal hematoma or soft tissue abnormality of concern. No  significant degenerative changes; specifically no focal severe spinal  canal or neuroforaminal stenosis.     LUMBAR SPINE: Sagittal datasets reveal normal alignment of the vertebral  segments without vertebral body height loss. Intervertebral disc height  spaces are fairly well-preserved with disc hydration in normal limits.  Distal spinal cord, conus and cauda equina without contour irregularity.  Axial dataset evaluation without paraspinal hematoma or soft tissue  abnormality of concern demonstrating minimal motion artifact. At the  L4-L5 level there is moderate circumferential disc bulge producing mild  spinal canal stenosis without significant neuroforaminal stenosis. At  the L5-S1 level there is circumferential disc bulge without significant  spinal canal or neuroforaminal stenosis.        Impression:       No evidence for pathologic bone marrow signal abnormality or  significant spinal canal/neuroforaminal stenosis of the thoracic or  lumbar spine. Normal alignment with without evidence for discrete  fracture.      DICTATED:     04/28/2018  EDITED/ls :     04/28/2018      This report was finalized on 4/28/2018 5:21 PM by Dr. Wilner Villar.       XR Chest 1 View [[id number]] Collected:  04/27/18 1048     Updated:  04/27/18 1517    Narrative:       EXAMINATION: XR CHEST 1 VW-04/27/2018:      INDICATION: Leukocytosis, cough; N39.0-Urinary tract infection, site not  specified; K52.9-Noninfective gastroenteritis and colitis, unspecified;  N17.9-Acute kidney failure, unspecified; E86.0-Dehydration;  R41.82-Altered mental status, unspecified.      COMPARISON: 04/25/2018.     FINDINGS: Portable chest reveals mild increased markings seen at the mid  and lower lung fields bilaterally unchanged in the interval with low  lung volumes. The heart is borderline enlarged. Degenerative changes  seen within the spine.           Impression:       Airspace disease seen within the mid and lower lung fields  bilaterally unchanged in the interval. The lung volumes remain low.     D:  04/27/2018  E:  04/27/2018     This report was finalized on 4/27/2018 3:15 PM by Dr. Janelle Paulson MD.       MRI Brain Without Contrast [[id number]] Collected:  04/26/18 1641     Updated:  04/27/18 1515    Narrative:       EXAMINATION: MRI BRAIN WO CONTRAST-04/26/2018:     INDICATION: Neuro deficit(s), subacute; N39.0-Urinary tract infection,  site not specified; K52.9-Noninfective gastroenteritis and colitis,  unspecified; N17.9-Acute kidney failure, unspecified; E86.0-Dehydration;  R41.82-Altered mental status, unspecified, lower extremity weakness.     TECHNIQUE: Routine multiplanar imaging was obtained of the brain without  the administration of Gadolinium contrast.     COMPARISON: NONE.     FINDINGS: No evidence of restricted diffusion  to suggest evidence of an  acute ischemic insult. Flow voids are preserved in the major  intracranial vessels. The visualized paranasal sinuses are grossly  clear. No abnormal extra-axial fluid collection is identified. Globes  and orbits are intact. Mastoid air cells are patent. No cerebellopontine  angle mass identified. Pituitary and sella are unremarkable.  Craniovertebral junction is preserved. Mild atrophy identified of the  brain with mild chronic changes seen in the periventricular and  subcortical white matter with no evidence of acute intracranial  abnormality identified.       Impression:       Mild atrophy identified of the brain with no acute  intracranial abnormality present.      D:  04/26/2018  E:  04/26/2018     This report was finalized on 4/27/2018 3:13 PM by Dr. Janelle Paulson MD.       MRI Cervical Spine Without Contrast [[id number]] Collected:  04/26/18 1641     Updated:  04/27/18 1515    Narrative:       EXAMINATION: MRI CERVICAL SPINE WO CONTRAST-04/26/2018:     INDICATION: Weakness; N39.0-Urinary tract infection, site not specified;  K52.9-Noninfective gastroenteritis and colitis, unspecified; N17.9-Acute  kidney failure, unspecified; E86.0-Dehydration; R41.82-Altered mental  status, unspecified, weakness, neck pain.     TECHNIQUE: Routine multiplanar imaging was obtained of the cervical  spine without the administration of Gadolinium contrast.     COMPARISON: NONE.     FINDINGS: The vertebral body height and disc spaces are preserved.  Facets are well aligned. Pedicles are intact. No fracture or  dislocation. No signal abnormality identified within the spinal cord.  Craniovertebral junction is preserved. No abnormal mass or fluid  collection seen within the paraspinal muscles.       Impression:       Unremarkable MRI of the cervical spine.     D:  04/26/2018  E:  04/26/2018            This report was finalized on 4/27/2018 3:13 PM by Dr. Janelle Paulson MD.       XR Femur 2 View  Right [[id number]] Collected:  04/26/18 1538     Updated:  04/26/18 1651    Narrative:          EXAMINATION: XR FEMUR 2 VW RIGHT - 04/26/2018     INDICATION: N39.0-Urinary tract infection, site not specified;  K52.9-Noninfective gastroenteritis and colitis, unspecified; N17.9-Acute  kidney failure, unspecified; E86.0-Dehydration; R41.82-Altered mental  status, unspecified.      COMPARISON: None.     FINDINGS:   1. There may be trace fluid in the suprapatellar bursal space of the  right knee. Otherwise, there is no fracture of the right knee. Articular  joint space is preserved.     2. Right femur is intact. There is no evidence of right femoral  fracture.     3. The right hip is intact. Hip fracture or malalignment is not  currently identified.           Impression:       Negative right femur to include right hip and right knee. No  acute osseous injury, fracture or dislocation is seen.     DICTATED:     04/26/2018  EDITED/ls :     04/26/2018      This report was finalized on 4/26/2018 4:49 PM by Dr. Williams Romano MD.       XR Humerus Right [[id number]] Collected:  04/26/18 1541     Updated:  04/26/18 1651    Narrative:          EXAMINATION: XR HUMERUS RIGHT - 04/26/2018     INDICATION: N39.0-Urinary tract infection, site not specified;  K52.9-Noninfective gastroenteritis and colitis, unspecified; N17.9-Acute  kidney failure, unspecified; E86.0-Dehydration; R41.82-Altered mental  status, unspecified.     COMPARISON: None.     FINDINGS:   1. 2 small radiodensities are seen in the subcutis fat of the right  upper outer arm. These are likely degenerative if not artifact.     2. The glenohumeral joint appears to be well aligned. The humerus is  intact. Humeral fracture is not identified and there is no dislocation.     3. The right distal clavicle, AC joint, acromion on and glenoid are  intact. There is evidence of some mild degenerative changes along the  olecranon. The elbow cannot be evaluated from this humeral  series.       Impression:       Negative humerus for acute injury, fracture or malalignment.  Other incidental findings as noted.     DICTATED:     04/26/2018  EDITED/ls :     04/26/2018      This report was finalized on 4/26/2018 4:49 PM by Dr. Williams Romano MD.       XR Pelvis 1 or 2 View [[id number]] Collected:  04/26/18 1535     Updated:  04/26/18 1651    Narrative:          EXAMINATION: XR PELVIS 1 OR  2 VIEWS - 04/26/2018     INDICATION: N39.0-Urinary tract infection, site not specified;  K52.9-Noninfective gastroenteritis and colitis, unspecified; N17.9-Acute  kidney failure, unspecified; E86.0-Dehydration; R41.82-Altered mental  status, unspecified.     COMPARISON: None.     FINDINGS: The hips are intact. Pelvic bone structures are unremarkable.  The ischial rami are normal. SI joints are within normal limits.           Impression:       Negative AP pelvis and negative views of the hips.     DICTATED:     04/26/2018  EDITED/ls :     04/26/2018      This report was finalized on 4/26/2018 4:49 PM by Dr. Williams Romano MD.       CT Abdomen Pelvis Without Contrast [[id number]] Collected:  04/24/18 0936     Updated:  04/25/18 1313    Narrative:       EXAMINATION: CT ABDOMEN AND PELVIS WO CONTRAST-04/23/2018:      INDICATION: Vomiting, diarrhea, possible FB ingestion, abdominal pain.     TECHNIQUE: Multiple axial CT imaging was obtained of the abdomen and  pelvis from the lung bases through the pubic symphysis without the  administration of oral or intravenous contrast.     The radiation dose reduction device was turned on for each scan per the  ALARA (As Low as Reasonably Achievable) protocol.     COMPARISON: NONE.     FINDINGS:      ABDOMEN: The lung bases are grossly clear. The liver is homogeneous in  appearance as well as the spleen. There is abnormal wall thickening  identified of the colon involving the descending colon and sigmoid colon  suggesting a focal colitis. There is abnormal pericolonic stranding.  The  remainder of the colon is unremarkable. The appendix is normal. There is  no abdominal or retroperitoneal lymphadenopathy. No free fluid or free  air. The pancreas is homogeneous.     PELVIS: The pelvic organs are unremarkable. The pelvic portions of the  gastrointestinal tract reveal abnormal wall thickening identified of the  descending colon and sigmoid colon suggesting a colitis with pericolonic  stranding. No significant free fluid or free air. No abscess. The bony  structures reveal no evidence of osseous abnormality.       Impression:       Abnormal wall thickening involving the descending colon and  sigmoid colon suggesting a colitis.     D:  04/23/2018  E:  04/24/2018           This report was finalized on 4/25/2018 1:10 PM by Dr. Janelle Paulson MD.       CT Head Without Contrast [[id number]] Collected:  04/24/18 0932     Updated:  04/25/18 1313    Narrative:       EXAMINATION: CT HEAD WO CONTRAST-04/23/2018:      INDICATION: Head injury, AMS, trauma.     TECHNIQUE: Multiple axial CT imaging was obtained of the head from the  skull base to the skull vertex without the administration of intravenous  contrast.     The radiation dose reduction device was turned on for each scan per the  ALARA (As Low as Reasonably Achievable) protocol.     COMPARISON: 10/06/2016.     FINDINGS: The parenchyma is unremarkable in appearance. No intracranial  hemorrhage or hydrocephalus. No mass, mass effect, or midline shift. No  abnormal extra-axial fluid collection is identified. The bony structures  reveal no evidence of osseous abnormality. The visualized paranasal  sinuses are clear. The mastoid air cells are patent.       Impression:       No acute intracranial abnormality.     D:  04/23/2018  E:  04/24/2018           This report was finalized on 4/25/2018 1:10 PM by Dr. Janelle Paulson MD.       XR Chest 1 View [[id number]] Collected:  04/25/18 0922     Updated:  04/25/18 1015    Narrative:       EXAMINATION:  XR CHEST 1 VW- 04/25/2018     INDICATION: DYSPNEA, ON EXERTION      COMPARISON: 04/23/2018     FINDINGS: There is volume reduction right lung base. There is borderline  cardiomegaly. There is perihilar vascular prominence. Although the  findings have progressed when compared to previous studies of 2 days  ago. Only the upper lung zones remain clear.           Impression:       Central vascular prominence, volume reduction at the bases,  especially right base with mild cardiomegaly, significantly worse when  compared to 2 days ago.     D:  04/25/2018  E:  04/25/2018     This report was finalized on 4/25/2018 10:13 AM by Dr. Williams Romano MD.       XR Chest 1 View [[id number]] Collected:  04/23/18 2123     Updated:  04/23/18 2231    Narrative:       EXAM:    XR Chest, 1 View    CLINICAL HISTORY:    27 years old, female; Dehydration; Noninfective gastroenteritis and colitis,   unspecified; Acute kidney failure, unspecified; Urinary tract infection, site   not specified; Altered mental status, unspecified; Signs and symptoms; Cough;   Symptoms not specified; Additional info: Drug use, dyaphagia, sore throat,   cough, sepsis. HX asthma. Mental health disorders.    TECHNIQUE:    Frontal view of the chest.    COMPARISON:    CR - XR CHEST 1 VW 2018-02-09 22:12    FINDINGS:    Lungs:  Unremarkable.  No consolidation.    Pleural space:  Unremarkable.  No pneumothorax.    Heart:  Unremarkable.  No cardiomegaly.    Mediastinum:  Unremarkable.    Bones/joints:  Unremarkable.      Impression:         Normal chest x-ray. No detrimental change from prior exam.     THIS DOCUMENT HAS BEEN ELECTRONICALLY SIGNED BY DENISE PASTOR MD          Results for orders placed during the hospital encounter of 04/23/18   Duplex Venous Lower Extremity - Right CAR    Narrative · No evidence of deep or superficial venous thrombosis in the right lower   extremity.          Results for orders placed during the hospital encounter of 04/23/18   Duplex  Venous Lower Extremity - Right CAR    Narrative · No evidence of deep or superficial venous thrombosis in the right lower   extremity.                 Discharge Details      Fartun Amin   Home Medication Instructions MADELAINE:[id number]    Printed on:05/01/18 121   Medication Information                      acetaminophen (TYLENOL) 325 MG tablet  Take 650 mg by mouth Every 6 (Six) Hours As Needed for mild pain (1-3).             albuterol (PROVENTIL HFA;VENTOLIN HFA) 108 (90 BASE) MCG/ACT inhaler  Inhale 2 puffs Every 4 (Four) Hours As Needed for wheezing.             atorvastatin (LIPITOR) 20 MG tablet  Take 20 mg by mouth Every Night.             benztropine (COGENTIN) 2 MG tablet  Take 2 mg by mouth 2 (Two) Times a Day.             calcium carbonate (TUMS) 500 MG chewable tablet  Chew 1 tablet As Needed for indigestion or heartburn.             chlorproMAZINE (THORAZINE) 25 MG tablet  Take 25 mg by mouth 3 (Three) Times a Day.             chlorproMAZINE (THORAZINE) 50 MG tablet  Take 50 mg by mouth As Needed for nausea.             clozapine (CLOZARIL) 100 MG tablet  Take 200 mg by mouth Daily.             divalproex (DEPAKOTE) 500 MG 24 hr tablet  Take 500 mg by mouth Daily.             divalproex (DEPAKOTE) 500 MG 24 hr tablet  Take 1,000 mg by mouth Every Night.             EPINEPHrine (EPIPEN 2-RUTHIE) 0.3 MG/0.3ML solution auto-injector injection  As Needed.             escitalopram (LEXAPRO) 10 MG tablet  Take 10 mg by mouth Every Morning.             fluticasone (VERAMYST) 27.5 MCG/SPRAY nasal spray  2 sprays into each nostril Daily.             folic acid (FOLVITE) 1 MG tablet  Take 1 mg by mouth Daily.             GUAIFENESIN PO  Take 600 mg by mouth As Needed.             haloperidol (HALDOL) 5 MG tablet  Take 5 mg by mouth 2 (Two) Times a Day As Needed for Agitation.             LORATADINE PO  Take 10 mg by mouth As Needed.             LORazepam (ATIVAN) 0.5 MG tablet  Take 0.5 mg by mouth Every 8  (Eight) Hours As Needed for anxiety.             montelukast (SINGULAIR) 10 MG tablet  Take 10 mg by mouth Every Night.             neomycin-bacitracin-polymyxin (NEOSPORIN) 5-400-5000 ointment  Apply  topically As Needed.             Norgestimate-Eth Estradiol (SPRINTEC 28 PO)  Take 1 tablet by mouth Daily.             ondansetron ODT (ZOFRAN-ODT) 8 MG disintegrating tablet  Take 8 mg by mouth Every 8 (Eight) Hours As Needed for nausea or vomiting.             pantoprazole (PROTONIX) 40 MG EC tablet  Take 40 mg by mouth Daily.             polycarbophil (FIBERCON) 625 MG tablet  Take 1,250 mg by mouth 2 (Two) Times a Day.             polyethylene glycol (MIRALAX) packet  Take 17 g by mouth 2 (Two) Times a Day.             promethazine (PHENERGAN) 25 MG tablet  Take 25 mg by mouth Every 6 (Six) Hours As Needed for nausea or vomiting.             propranolol (INDERAL) 20 MG tablet  Take 20 mg by mouth 3 (Three) Times a Day.             raNITIdine (ZANTAC) 150 MG tablet  Take 150 mg by mouth Every Night.             traZODone (DESYREL) 50 MG tablet  Take 50 mg by mouth Every Night.                   Discharge Disposition:  [facility] or Unit ([place] [facility])    Discharge Diet:  Diet Instructions     You may resume a regular diet.                  Discharge Activity: As tolerated      No future appointments.      Time Spent on Discharge:  40 minutes    Electronically signed by Niles Cheatham MD, 05/01/18, 12:19 PM.

## 2018-05-01 NOTE — PLAN OF CARE
Problem: Patient Care Overview  Goal: Plan of Care Review  Outcome: Ongoing (interventions implemented as appropriate)   05/01/18 1118   Coping/Psychosocial   Plan of Care Reviewed With patient   OTHER   Outcome Summary Pt agreeable to skilled OT. Pt presents with ongoing R side/UE/LE pain limiting mobility and ADL participation. Mod A and increased time for bed mobility. Min A x2 STS and Bed to Chair. Mod ADL dressing/grooming tasks. OT to follow.

## 2018-05-01 NOTE — PLAN OF CARE
Problem: Patient Care Overview  Goal: Plan of Care Review   05/01/18 1503   Coping/Psychosocial   Plan of Care Reviewed With patient   Plan of Care Review   Progress improving   OTHER   Outcome Summary VSS. BM today. Medically ready to go to [facility].        Problem: Suicide Risk (Adult)  Goal: Physical Safety  Outcome: Ongoing (interventions implemented as appropriate)      Problem: Pain, Acute (Adult)  Goal: Acceptable Pain Control/Comfort Level  Outcome: Ongoing (interventions implemented as appropriate)      Problem: Skin Injury Risk (Adult)  Goal: Skin Health and Integrity  Outcome: Ongoing (interventions implemented as appropriate)      Problem: Fall Risk (Adult)  Goal: Absence of Fall  Outcome: Ongoing (interventions implemented as appropriate)

## 2018-05-01 NOTE — PLAN OF CARE
Problem: Patient Care Overview  Goal: Plan of Care Review  Outcome: Unable to achieve outcome(s) by discharge Date Met: 05/01/18 05/01/18 1211   Coping/Psychosocial   Plan of Care Reviewed With patient   Plan of Care Review   Progress improving   OTHER   Outcome Summary Able to amb total of 40 ft w/ R wx & min asst, w/ 7 min sit rest for pain medic.; c/o Ezekiel hip pain & fatigue; emotionally labile/crying; did not meet goals; will transf to [facility] for further intervention

## 2018-05-01 NOTE — THERAPY DISCHARGE NOTE
Acute Care - Physical Therapy Treatment Note/Discharge  [facility]     Patient Name: Fartun Amin  : 1990  MRN: [id number]  Today's Date: 2018  Onset of Illness/Injury or Date of Surgery: 18  Date of Referral to PT: 18  Referring Physician: DO Radha    Admit Date: 2018    Visit Dx:    ICD-10-CM ICD-9-CM   1. Acute UTI N39.0 599.0   2. Acute colitis K52.9 558.9   3. LORE (acute kidney injury) N17.9 584.9   4. Dehydration E86.0 276.51   5. Altered mental status, unspecified altered mental status type R41.82 780.97   6. Impaired functional mobility, balance, gait, and endurance Z74.09 V49.89   7. Impaired mobility and ADLs Z74.09 799.89     Patient Active Problem List   Diagnosis   • Abnormal drug screen   • LORE (acute kidney injury)   • Altered mental status   • Sepsis   • UTI (urinary tract infection)   • HTN (hypertension)   • Adjustment disorder   • Personality disorder   • PTSD (post-traumatic stress disorder)   • PCOS (polycystic ovarian syndrome)   • Intermittent explosive disorder   • Suicide attempt by drug ingestion   • Hyperkalemia   • GERD (gastroesophageal reflux disease)   • Asthma   • QT prolongation   • Non-traumatic rhabdomyolysis       Physical Therapy Education     Title: PT OT SLP Therapies (Active)     Topic: Physical Therapy (Active)     Point: Mobility training (Active)    Learning Progress Summary     Learner Status Readiness Method Response Comment Documented by    Patient Active Acceptance E,D NR   18 121     Active Nonacceptance EGABE NR,NL   18     Active Acceptance E NR   18 1041          Point: Home exercise program (Active)    Learning Progress Summary     Learner Status Readiness Method Response Comment Documented by    Patient Active Acceptance E,D NR   18 1211     Active Nonacceptance E,TB NR,NL   18     Active Acceptance E NR   18 1041          Point: Body mechanics (Active)    Learning Progress  Summary     Learner Status Readiness Method Response Comment Documented by    Patient Active Acceptance E,D NR  DM 05/01/18 1211     Active Nonacceptance E,TB NR,NL   04/30/18 2019     Active Acceptance E NR   04/28/18 1041          Point: Precautions (Active)    Learning Progress Summary     Learner Status Readiness Method Response Comment Documented by    Patient Active Acceptance E,D NR  DM 05/01/18 1211     Active Nonacceptance E,TB NR,NL   04/30/18 2019     Active Acceptance E NR   04/28/18 1041                      User Key     Initials Effective Dates Name Provider Type Discipline     06/19/15 -  Evelyn Palacios, PT Physical Therapist PT     06/19/15 -  Norma Lynne, PT Physical Therapist PT     06/16/16 -  Evelin Cedeño, RN Registered Nurse Nurse                    PT Rehab Goals     Row Name 05/01/18 1130             Bed Mobility Goal 1 (PT)    Activity/Assistive Device (Bed Mobility Goal 1, PT) sit to supine;supine to sit  -DM      [place] Level/Cues Needed (Bed Mobility Goal 1, PT) minimum assist (75% or more patient effort)  -DM      Time Frame (Bed Mobility Goal 1, PT) long term goal (LTG);10 days  -DM      Progress/Outcomes (Bed Mobility Goal 1, PT) goal not met  -DM         Transfer Goal 1 (PT)    Activity/Assistive Device (Transfer Goal 1, PT) sit-to-stand/stand-to-sit  -DM      [place] Level/Cues Needed (Transfer Goal 1, PT) standby assist  -DM      Time Frame (Transfer Goal 1, PT) 10 days;long term goal (LTG)  -DM      Progress/Outcome (Transfer Goal 1, PT) goal not met  -DM         Gait Training Goal 1 (PT)    Activity/Assistive Device (Gait Training Goal 1, PT) gait (walking locomotion)  -DM      [place] Level (Gait Training Goal 1, PT) contact guard assist  -DM      Distance (Gait Goal 1, PT) 50  -DM      Time Frame (Gait Training Goal 1, PT) long term goal (LTG);10 days  -DM      Progress/Outcome (Gait Training Goal 1, PT) goal not met  -DM        User  Key  (r) = Recorded By, (t) = Taken By, (c) = Cosigned By    Initials Name Provider Type Discipline    DM Norma Lynne, PT Physical Therapist PT        Therapy Treatment        Rehabilitation Treatment Summary     Row Name 05/01/18 1130 05/01/18 1020          Treatment Time/Intention    Discipline physical therapist  -DM occupational therapist  -TB     Document Type  -- therapy note (daily note)  -TB     Subjective Information complains of;pain  -DM complains of;pain  -TB     Mode of Treatment individual therapy;physical therapy  -DM individual therapy  -TB     Patient/Family Observations UIC;TELE;RA;IV hep locked; exit alarm;nsg present,trying to persuade pt to give best effort for mobil;; CM req. amb w/ AD & Call her w/ results  -DM Pt rec'vd in bed/fowlers, room air, IV heplocked; sitter present  -TB     Care Plan Review care plan/treatment goals reviewed;patient/other agree to care plan  -DM risks/benefits reviewed;care plan/treatment goals reviewed  -TB     Therapy Frequency (PT Clinical Impression) daily  -DM  --     Patient Effort good  -DM good  -TB     Comment onset pain B hips; nsg gave 2 tylenol during sit. rest between walks  -DM Increased pain R UE/LE with movement  -TB     Existing Precautions/Restrictions fall  -DM fall   exit alarms; active A/V hallucinations  -TB     Treatment Considerations/Comments  -- Pt observed to move R UE spontaneously with effort/grimacing; makes no attempt to integrate R UE into midline  bimanual tasks  -TB     Recorded by [DM] Norma Lynne, PT 05/01/18 1211 05/01/18 1211 [TB] Cindi Florence, OT 05/01/18 1115 05/01/18 1115     Row Name 05/01/18 1130 05/01/18 1020          Vital Signs    Pre Systolic BP Rehab 95  -  -TB     Pre Treatment Diastolic BP 70  -DM 68  -TB     Post Systolic BP Rehab 117  -DM  --     Post Treatment Diastolic BP 73  -DM  --     Pretreatment Heart Rate (beats/min) 117  -DM  --     Intratreatment Heart Rate (beats/min) 120  -DM  --      Posttreatment Heart Rate (beats/min) 117  -DM  --     Pre SpO2 (%) 97  -DM  --     O2 Delivery Pre Treatment room air  -DM room air  -TB     Post SpO2 (%) 97  -DM  --     O2 Delivery Post Treatment room air  -DM  --     Pre Patient Position Sitting  -DM Supine  -TB     Intra Patient Position Standing  -DM Standing  -TB     Post Patient Position Sitting  -DM Sitting  -TB     Recorded by [DM] Norma Lynne, PT 05/01/18 1211 05/01/18 1211 [TB] Cindi Florence, OT 05/01/18 1115 05/01/18 1115     Row Name 05/01/18 1130             Cognitive Assessment/Intervention    Additional Documentation Cognitive Assessment/Intervention (Group)  -DM      Recorded by [DM] Norma Lynne, PT 05/01/18 1211 05/01/18 1211      Row Name 05/01/18 1130 05/01/18 1020          Cognitive Assessment/Intervention- PT/OT    Affect/Mental Status (Cognitive) anxious  -DM anxious;flat/blunted affect  -TB     Orientation Status (Cognition) oriented to;person;place  -DM oriented to;person;place;situation  -TB     Follows Commands (Cognition) follows one step commands;75-90% accuracy  -DM follows one step commands;75-90% accuracy  -TB     Safety Deficit (Cognitive) insight into deficits/self awareness  -DM insight into deficits/self awareness  -TB     Personal Safety Interventions fall prevention program maintained;gait belt;nonskid shoes/slippers when out of bed  -DM fall prevention program maintained   exit alarms; sitter  -TB     Recorded by [DM] Norma Lynne, PT 05/01/18 1211 05/01/18 1211 [TB] Cindi Florence, OT 05/01/18 1115 05/01/18 1115     Row Name 05/01/18 1130 05/01/18 1020          Safety Issues, Functional Mobility    Safety Issues Affecting Function (Mobility) awareness of need for assistance;impulsivity;insight into deficits/self awareness  -DM insight into deficits/self awareness  -TB     Impairments Affecting Function (Mobility) balance;pain;postural/trunk control;strength  -DM balance;endurance/activity  tolerance;pain;strength  -TB     Recorded by [DM] Norma Lynne, PT 05/01/18 1211 05/01/18 1211 [TB] Cindi Elva Florence, OT 05/01/18 1115 05/01/18 1115     Row Name 05/01/18 1130 05/01/18 1020          Bed Mobility Assessment/Treatment    Bed Mobility Assessment/Treatment  -- rolling right;scooting/bridging;supine-sit  -TB     Rolling Right [place] (Bed Mobility)  -- moderate assist (50% patient effort);verbal cues  -TB     Scooting/Bridging [place] (Bed Mobility)  -- moderate assist (50% patient effort);verbal cues  -TB     Supine-Sit [place] (Bed Mobility)  -- moderate assist (50% patient effort);verbal cues  -TB     Bed Mobility, Safety Issues  -- decreased use of arms for pushing/pulling;decreased use of legs for bridging/pushing  -TB     Assistive Device (Bed Mobility)  -- bed rails;head of bed elevated  -TB     Comment (Bed Mobility) UI  -DM encouragement, cues to sequence, increased time  -TB     Recorded by [DM] oNrma Lynne, PT 05/01/18 1211 05/01/18 1211 [TB] Cindi Florence, OT 05/01/18 1115 05/01/18 1115     Row Name 05/01/18 1020             Functional Mobility    Functional Mobility- Ind. Level minimum assist (75% patient effort);verbal cues required;1 person + 1 person to manage equipment  -TB      Functional Mobility- Device standard walker  -TB      Functional Mobility-Distance (Feet) 8  -TB      Functional Mobility- Safety Issues step length decreased;weight-shifting ability decreased;sequencing ability decreased;balance decreased during turns  -TB      Functional Mobility- Comment encouragment, cues to sequence, assist to advance SW  -TB      Recorded by [TB] Cindi Florence, OT 05/01/18 1115 05/01/18 1115      Row Name 05/01/18 1130 05/01/18 1020          Transfer Assessment/Treatment    Transfer Assessment/Treatment sit-stand transfer;stand-sit transfer  -DM sit-stand transfer;stand-sit transfer;bed-chair transfer  -TB     Comment (Transfers) CUES FOR HP,seq   "-DM cues for hand placement; encouragement  -TB     Bed-Chair [place] (Transfers)  -- minimum assist (75% patient effort);2 person assist  -TB     Assistive Device (Bed-Chair Transfers)  -- walker, standard  -TB     Sit-Stand [place] (Transfers) moderate assist (50% patient effort)  -DM minimum assist (75% patient effort);2 person assist  -TB     Stand-Sit [place] (Transfers) verbal cues;minimum assist (75% patient effort)  -DM minimum assist (75% patient effort);verbal cues  -TB     Recorded by [DM] Norma Lynne, PT 05/01/18 1211 05/01/18 1211 [TB] Cindi Florence, OT 05/01/18 1115 05/01/18 1115     Row Name 05/01/18 1130 05/01/18 1020          Sit-Stand Transfer    Assistive Device (Sit-Stand Transfers) walker, front-wheeled  -DM walker, standard  -TB     Recorded by [DM] Norma Lynne, PT 05/01/18 1211 05/01/18 1211 [TB] Cindi Florence, OT 05/01/18 1115 05/01/18 1115     Row Name 05/01/18 1130 05/01/18 1020          Stand-Sit Transfer    Assistive Device (Stand-Sit Transfers) walker, front-wheeled  -DM walker, standard  -TB     Recorded by [DM] Norma Lynne, PT 05/01/18 1211 05/01/18 1211 [TB] Cindi Florence, OT 05/01/18 1115 05/01/18 1115     Row Name 05/01/18 1130             Gait/Stairs Assessment/Training    Gait/Stairs Assessment/Training gait/ambulation independence;gait/ambulation assistive device;distance ambulated;gait pattern;gait deviations  -DM      [place] Level (Gait) verbal cues;minimum assist (75% patient effort)  -DM      Assistive Device (Gait) walker, front-wheeled  -DM      Distance in Feet (Gait) 40   10+30; 7 min.sit rest btwn;brought chair behind;rolled toRm  -DM      Pattern (Gait) step-to  -DM      Deviations/Abnormal Patterns (Gait) bilateral deviations;antalgic;base of support, wide;maicol decreased  -DM      Bilateral Gait Deviations forward flexed posture;heel strike decreased  -DM      Comment (Gait/Stairs) crying;stating \"going to " "fall\";staff gave stuffed animal to motivate (tucked inside gt belt)  -DM      Recorded by [DM] Norma Lynne, PT 05/01/18 1211 05/01/18 1211      Row Name 05/01/18 1020             ADL Assessment/Intervention    BADL Assessment/Intervention upper body dressing;lower body dressing;grooming  -TB      Recorded by [TB] Cindi Florence, OT 05/01/18 1115 05/01/18 1115      Row Name 05/01/18 1020             Upper Body Dressing Assessment/Training    Upper Body Dressing [place] Level don;pajama/robe  -TB      Assistive Devices (Upper Body Dressing) one hand technique  -TB      Upper Body Dressing Position edge of bed sitting  -TB      Comment (Upper Body Dressing) Education reinforced for R UE tucker-dressing  -TB      Recorded by [TB] Cindi Florence, OT 05/01/18 1115 05/01/18 1115      Row Name 05/01/18 1020             Lower Body Dressing Assessment/Training    Lower Body Dressing [place] Level don;socks;dependent (less than 25% patient effort)  -TB      Lower Body Dressing Position edge of bed sitting  -TB      Comment (Lower Body Dressing) Pt reports, staff completes socks/shoes at group home  -TB      Recorded by [TB] Cindi Florence, OT 05/01/18 1115 05/01/18 1115      Row Name 05/01/18 1020             Grooming Assessment/Training    [place] Level (Grooming) hair care, combing/brushing;oral care regimen;minimum assist (75% patient effort)  -TB      Grooming Position supported sitting  -TB      Comment (Grooming) Setup to comb hair; Min A for oral care as pt declines to integrate R UE in midline task  -TB      Recorded by [TB] Cindi Florence, OT 05/01/18 1115 05/01/18 1115      Row Name 05/01/18 1130             Motor Skills Assessment/Interventions    Additional Documentation Balance (Group);Balance Interventions (Group);Therapeutic Exercise (Group);Therapeutic Exercise Interventions (Group)  -DM      Recorded by [DM] Norma Lynne, PT 05/01/18 1211 05/01/18 1211      Row " Name 05/01/18 1130 05/01/18 1020          Therapeutic Exercise    Upper Extremity Range of Motion (Therapeutic Exercise)  -- shoulder flexion/extension, bilateral;shoulder abduction/adduction, bilateral;shoulder horizontal abduction/adduction, bilateral;elbow flexion/extension, bilateral  -TB     Hand (Therapeutic Exercise)  -- hand , bilateral  -TB     Lower Extremity Range of Motion (Therapeutic Exercise) hip flexion/extension, bilateral;hip abduction/adduction, bilateral;knee flexion/extension, bilateral;ankle dorsiflexion/plantar flexion, bilateral   also qs  -DM  --     Exercise Type (Therapeutic Exercise) AROM (active range of motion);isometric contraction, static  -DM AROM (active range of motion);AAROM (active assistive range of motion)   hand  AROM x10 reps  -TB     Position (Therapeutic Exercise) seated  -DM seated  -TB     Sets/Reps (Therapeutic Exercise) 10  -DM x10  -TB     Recorded by [DM] Norma Lynne, PT 05/01/18 1211 05/01/18 1211 [TB] Cindi Florence, OT 05/01/18 1115 05/01/18 1115     Row Name 05/01/18 1130             Balance    Balance static sitting balance;static standing balance  -DM      Recorded by [DM] Norma Lynne, PT 05/01/18 1211 05/01/18 1211      Row Name 05/01/18 1130 05/01/18 1020          Static Sitting Balance    Level of [place] (Unsupported Sitting, Static Balance) contact guard assist  -DM supervision  -TB     Sitting Position (Unsupported Sitting, Static Balance) sitting in chair   front of chair; scooting F/B  -DM  --     Time Able to Maintain Position (Unsupported Sitting, Static Balance) more than 5 minutes  -DM  --     Recorded by [DM] Norma Lynne, PT 05/01/18 1211 05/01/18 1211 [TB] Cindi Florence, OT 05/01/18 1115 05/01/18 1115     Row Name 05/01/18 1130 05/01/18 1020          Static Standing Balance    Level of [place] (Supported Standing, Static Balance) minimal assist, 75% patient effort  -DM minimal assist, 75% patient effort   -TB     Time Able to Maintain Position (Supported Standing, Static Balance) 2 to 3 minutes  -DM 1 to 2 minutes  -TB     Assistive Device Utilized (Supported Standing, Static Balance) rolling walker  -DM  --     Comment (Supported Standing, Static Balance) Cues for trunk ext,focusAhead,PLB  -DM  --     Recorded by [DM] Norma Lynne, PT 05/01/18 1211 05/01/18 1211 [TB] Cindi Florence, OT 05/01/18 1115 05/01/18 1115     Row Name 05/01/18 1130 05/01/18 1020          Positioning and Restraints    Pre-Treatment Position sitting in chair/recliner  -DM in bed  -TB     Post Treatment Position chair  -DM chair  -TB     In Chair notified nsg;reclined;call light within reach;encouraged to call for assist;exit alarm on;with nsg;waffle cushion;legs elevated  -DM reclined;call light within reach;encouraged to call for assist;with nsg   Sitter present  -TB     Recorded by [DM] Norma Lynne, PT 05/01/18 1211 05/01/18 1211 [TB] Cindi Florence, OT 05/01/18 1115 05/01/18 1115     Row Name 05/01/18 1130             Pain Assessment    Additional Documentation Pain Scale: FACES Pre/Post-Treatment (Group)  -DM      Recorded by [DM] Norma Lynne, PT 05/01/18 1211 05/01/18 1211      Row Name 05/01/18 1130 05/01/18 1020          Pain Scale: Numbers Pre/Post-Treatment    Pain Location - Side Bilateral  -DM Right  -TB     Pain Location - Orientation  -- generalized   UE, flank, hip  -TB     Pain Location hip  -DM  --     Pain Intervention(s) Medication (See MAR);Repositioned;Rest  -DM Ambulation/increased activity;Repositioned  -TB     Recorded by [DM] Norma Lynne, PT 05/01/18 1211 05/01/18 1211 [TB] Cindi Florence, OT 05/01/18 1115 05/01/18 1115     Row Name 05/01/18 1130 05/01/18 1020          Pain Scale: FACES Pre/Post-Treatment    Pain: FACES Scale, Pretreatment 4-->hurts little more  -DM 2-->hurts little bit  -TB     Pain: FACES Scale, Post-Treatment 6-->hurts even more  -DM 2-->hurts little bit   4/10  with mobility  -TB     Pre/Post Treatment Pain Comment  -- Increased pain with mobility; pt resting comfortably at end of session  -TB     Recorded by [DM] Norma Lynne, PT 05/01/18 1211 05/01/18 1211 [TB] Cindi Florence, OT 05/01/18 1115 05/01/18 1115     Row Name 05/01/18 1020             Coping    Observed Emotional State calm;cooperative  -TB      Verbalized Emotional State anxiety  -TB      Recorded by [TB] Cindi Florence, OT 05/01/18 1115 05/01/18 1115      Row Name 05/01/18 1130 05/01/18 1020          Plan of Care Review    Plan of Care Reviewed With patient  -DM patient  -TB     Recorded by [DM] Norma Lynne, PT 05/01/18 1211 05/01/18 1211 [TB] Cindi Florence, OT 05/01/18 1115 05/01/18 1115     Row Name 05/01/18 1020             Outcome Summary/Treatment Plan (OT)    Daily Summary of Progress (OT) progress towards functional goals is fair  -TB      Barriers to Overall Progress (OT) pain, self-limiting behaviors  -TB2      Plan for Continued Treatment (OT) per POC  -TB2      Anticipated Discharge Disposition (OT) psychiatric hospital or unit   [facility]  -TB2      Recorded by [TB] Cindi Florence, OT 05/01/18 1116 05/01/18 1116  [TB2] Cindi Florence, OT 05/01/18 1115 05/01/18 1115      Row Name 05/01/18 1130             Outcome Summary/Treatment Plan (PT)    Daily Summary of Progress (PT) progress toward functional goals is good  -DM      Anticipated Discharge Disposition (PT) psychiatric hospital or unit  -DM      Recorded by [DM] Norma Lynne, PT 05/01/18 1211 05/01/18 1211        User Key  (r) = Recorded By, (t) = Taken By, (c) = Cosigned By    Initials Name Effective Dates Discipline    TB Cindi Florence, OT 06/22/15 -  OT    DM Norma Lynne, PT 06/19/15 -  PT             PT Recommendation and Plan  Anticipated Discharge Disposition (PT): psychiatric hospital or unit  Therapy Frequency (PT Clinical Impression): daily  Outcome Summary/Treatment Plan  (PT)  Daily Summary of Progress (PT): progress toward functional goals is good  Anticipated Discharge Disposition (PT): psychiatric hospital or unit  Plan of Care Reviewed With: patient  Progress: improving  Outcome Summary: Able to amb total of 40 ft w/ R wx & min asst, w/ 7 min sit rest for pain medic.; c/o [place] hip pain & fatigue; emotionally labile/crying; did not meet goals; will transf to [facility] for further intervention          Outcome Measures     Row Name 05/01/18 1130 05/01/18 1020          How much help from another person do you currently need...    Turning from your back to your side while in flat bed without using bedrails? 2  -DM  --     Moving from lying on back to sitting on the side of a flat bed without bedrails? 2  -DM  --     Moving to and from a bed to a chair (including a wheelchair)? 2  -DM  --     Standing up from a chair using your arms (e.g., wheelchair, bedside chair)? 2  -DM  --     Climbing 3-5 steps with a railing? 1  -DM  --     To walk in hospital room? 3  -DM  --     AM-PAC 6 Clicks Score 12  -DM  --        How much help from another is currently needed...    Putting on and taking off regular lower body clothing?  -- 2  -TB     Bathing (including washing, rinsing, and drying)  -- 2  -TB     Toileting (which includes using toilet bed pan or urinal)  -- 2  -TB     Putting on and taking off regular upper body clothing  -- 2  -TB     Taking care of personal grooming (such as brushing teeth)  -- 3  -TB     Eating meals  -- 3  -TB     Score  -- 14  -TB        Functional Assessment    Outcome Measure Options AM-PAC 6 Clicks Basic Mobility (PT)  -DM AM-PAC 6 Clicks Daily Activity (OT)  -TB       User Key  (r) = Recorded By, (t) = Taken By, (c) = Cosigned By    Initials Name Provider Type    TB Cindi Florence, OT Occupational Therapist    REGGIE Lynne, PT Physical Therapist           Time Calculation:         PT Charges     Row Name 05/01/18 1216             Time  Calculation    Start Time 1130  -DM      PT Received On 05/01/18  -DM      PT Goal Re-Cert Due Date 05/08/18  -DM         Time Calculation- PT    Total Timed Code Minutes- PT 38 minute(s)  -DM        User Key  (r) = Recorded By, (t) = Taken By, (c) = Cosigned By    Initials Name Provider Type    DM Norma Lynne, PT Physical Therapist          Therapy Charges for Today     Code Description Service Date Service Provider Modifiers Qty    09805669898 HC PT THER PROC EA 15 MIN 5/1/2018 Norma Lynne, PT GP 2    [id number] HC GAIT TRAINING EA 15 MIN 5/1/2018 Norma Lynne, PT GP 1          PT G-Codes  Outcome Measure Options: AM-PAC 6 Clicks Basic Mobility (PT)    PT Discharge Summary  Anticipated Discharge Disposition (PT): psychiatric hospital or unit  Reason for Discharge: Discharge from facility  Outcomes Achieved: Patient able to partially acheive established goals  Discharge Destination: other (comment)    Norma Lynne, PT  5/1/2018

## 2018-05-01 NOTE — PLAN OF CARE
Problem: Patient Care Overview  Goal: Plan of Care Review  Outcome: Ongoing (interventions implemented as appropriate)   05/01/18 0731   Coping/Psychosocial   Plan of Care Reviewed With patient   Plan of Care Review   Progress no change   OTHER   Outcome Summary vss, very appropriate until 2030 patient started to have visual and auditory hallucinations, hitting head on chair pullling hair, placed back in bed, provided music therapy which helped a lot. she would benefit from headphones and a radio in the room she said it helps with the voices. bath given, good uop, up in the chair this am. Awaiting placement

## 2018-05-01 NOTE — PROGRESS NOTES
Continued Stay Note  [facility]     Patient Name: Fartun Amin  MRN: [id number]  Today's Date: 5/1/2018    Admit Date: 4/23/2018          Discharge Plan     Row Name 05/01/18 0852       Plan    Plan Social work called the [facility] Clair Jacobs at [phone] and had to leave her a message regarding Fartun Amin and the plan for her to go to [facility] for an involuntary.  [facility] would require a nursing report at phone [phone] to determine if [facility] would be able to accept her.  Social work will need to go to the court to request that a  sign an involuntary once the [facility] Clair Mockb can be contacted.    Patient/Family in Agreement with Plan yes              Discharge Codes    No documentation.       Expected Discharge Date and Time     Expected Discharge Date Expected Discharge Time    Apr 27, 2018             LESLY Obregon

## 2018-05-01 NOTE — PROGRESS NOTES
Continued Stay Note   Alec     Patient Name: Fartun Amin  MRN: [id number]  Today's Date: 5/1/2018    Admit Date: 4/23/2018          Discharge Plan     Row Name 05/01/18 1131       Plan    Plan The involuntary to [facility] was approved by the  and the Office of the [facility] will come to take Fartun Amin to [facility] today at 4:00 pm.  Clair Reynaldo, [phone] with State Guardianship has been notified and she is agreeable to the plan.    Patient/Family in Agreement with Plan yes              Discharge Codes    No documentation.       Expected Discharge Date and Time     Expected Discharge Date Expected Discharge Time    Apr 27, 2018             LESLY Obregon

## 2018-05-02 ENCOUNTER — APPOINTMENT (OUTPATIENT)
Dept: CARDIOLOGY | Facility: HOSPITAL | Age: 28
End: 2018-05-02

## 2018-05-02 ENCOUNTER — APPOINTMENT (OUTPATIENT)
Dept: CT IMAGING | Facility: HOSPITAL | Age: 28
End: 2018-05-02

## 2018-05-02 PROBLEM — M25.551 PAIN OF RIGHT HIP JOINT: Status: ACTIVE | Noted: 2018-05-02

## 2018-05-02 PROBLEM — D64.9 ANEMIA: Status: ACTIVE | Noted: 2018-05-02

## 2018-05-02 PROBLEM — R53.1 WEAKNESS: Status: ACTIVE | Noted: 2018-05-02

## 2018-05-02 PROBLEM — M25.511 ACUTE PAIN OF RIGHT SHOULDER: Status: ACTIVE | Noted: 2018-05-02

## 2018-05-02 PROBLEM — R74.01 TRANSAMINITIS: Status: ACTIVE | Noted: 2018-05-02

## 2018-05-02 PROBLEM — D72.829 LEUKOCYTOSIS: Status: ACTIVE | Noted: 2018-05-02

## 2018-05-02 PROBLEM — R53.1 GENERALIZED WEAKNESS: Status: ACTIVE | Noted: 2018-05-02

## 2018-05-02 LAB
ALBUMIN SERPL-MCNC: 3.5 G/DL (ref 3.2–4.8)
ALBUMIN/GLOB SERPL: 1.2 G/DL (ref 1.5–2.5)
ALP SERPL-CCNC: 234 U/L (ref 25–100)
ALT SERPL W P-5'-P-CCNC: 152 U/L (ref 7–40)
ANION GAP SERPL CALCULATED.3IONS-SCNC: 8 MMOL/L (ref 3–11)
AST SERPL-CCNC: 122 U/L (ref 0–33)
BACTERIA UR QL AUTO: NORMAL /HPF
BASOPHILS # BLD AUTO: 0.06 10*3/MM3 (ref 0–0.2)
BASOPHILS # BLD AUTO: 0.11 10*3/MM3 (ref 0–0.2)
BASOPHILS NFR BLD AUTO: 0.4 % (ref 0–1)
BASOPHILS NFR BLD AUTO: 0.6 % (ref 0–1)
BH CV ECHO MEAS - AO MAX PG (FULL): 1.3 MMHG
BH CV ECHO MEAS - AO MAX PG: 5 MMHG
BH CV ECHO MEAS - AO MEAN PG (FULL): 1.1 MMHG
BH CV ECHO MEAS - AO MEAN PG: 3.3 MMHG
BH CV ECHO MEAS - AO ROOT AREA (BSA CORRECTED): 1.5
BH CV ECHO MEAS - AO ROOT AREA: 8.3 CM^2
BH CV ECHO MEAS - AO ROOT DIAM: 3.2 CM
BH CV ECHO MEAS - AO V2 MAX: 111.8 CM/SEC
BH CV ECHO MEAS - AO V2 MEAN: 87.5 CM/SEC
BH CV ECHO MEAS - AO V2 VTI: 24.9 CM
BH CV ECHO MEAS - AVA(I,A): 2.6 CM^2
BH CV ECHO MEAS - AVA(I,D): 2.6 CM^2
BH CV ECHO MEAS - AVA(V,A): 2.8 CM^2
BH CV ECHO MEAS - AVA(V,D): 2.8 CM^2
BH CV ECHO MEAS - BSA(HAYCOCK): 2.3 M^2
BH CV ECHO MEAS - BSA: 2.1 M^2
BH CV ECHO MEAS - BZI_BMI: 43.6 KILOGRAMS/M^2
BH CV ECHO MEAS - BZI_METRIC_HEIGHT: 160 CM
BH CV ECHO MEAS - BZI_METRIC_WEIGHT: 111.6 KG
BH CV ECHO MEAS - CONTRAST EF (2CH): 62.2 ML/M^2
BH CV ECHO MEAS - CONTRAST EF 4CH: 68.8 ML/M^2
BH CV ECHO MEAS - EDV(CUBED): 55.5 ML
BH CV ECHO MEAS - EDV(MOD-SP2): 90 ML
BH CV ECHO MEAS - EDV(MOD-SP4): 93 ML
BH CV ECHO MEAS - EDV(TEICH): 62.5 ML
BH CV ECHO MEAS - EF(CUBED): 69.4 %
BH CV ECHO MEAS - EF(MOD-SP2): 62.2 %
BH CV ECHO MEAS - EF(MOD-SP4): 68.8 %
BH CV ECHO MEAS - EF(TEICH): 61.7 %
BH CV ECHO MEAS - ESV(CUBED): 17 ML
BH CV ECHO MEAS - ESV(MOD-SP2): 34 ML
BH CV ECHO MEAS - ESV(MOD-SP4): 29 ML
BH CV ECHO MEAS - ESV(TEICH): 24 ML
BH CV ECHO MEAS - FS: 32.6 %
BH CV ECHO MEAS - IVS/LVPW: 1
BH CV ECHO MEAS - IVSD: 0.98 CM
BH CV ECHO MEAS - LA DIMENSION: 2.8 CM
BH CV ECHO MEAS - LA/AO: 0.88
BH CV ECHO MEAS - LAT PEAK E' VEL: 13.7 CM/SEC
BH CV ECHO MEAS - LV DIASTOLIC VOL/BSA (35-75): 44 ML/M^2
BH CV ECHO MEAS - LV MASS(C)D: 113.3 GRAMS
BH CV ECHO MEAS - LV MASS(C)DI: 53.7 GRAMS/M^2
BH CV ECHO MEAS - LV MAX PG: 3.7 MMHG
BH CV ECHO MEAS - LV MEAN PG: 2.2 MMHG
BH CV ECHO MEAS - LV SYSTOLIC VOL/BSA (12-30): 13.7 ML/M^2
BH CV ECHO MEAS - LV V1 MAX: 96.3 CM/SEC
BH CV ECHO MEAS - LV V1 MEAN: 70.1 CM/SEC
BH CV ECHO MEAS - LV V1 VTI: 20 CM
BH CV ECHO MEAS - LVIDD: 3.8 CM
BH CV ECHO MEAS - LVIDS: 2.6 CM
BH CV ECHO MEAS - LVLD AP2: 8.3 CM
BH CV ECHO MEAS - LVLD AP4: 7.9 CM
BH CV ECHO MEAS - LVLS AP2: 7 CM
BH CV ECHO MEAS - LVLS AP4: 6.7 CM
BH CV ECHO MEAS - LVOT AREA (M): 3.1 CM^2
BH CV ECHO MEAS - LVOT AREA: 3.2 CM^2
BH CV ECHO MEAS - LVOT DIAM: 2 CM
BH CV ECHO MEAS - LVPWD: 0.96 CM
BH CV ECHO MEAS - MED PEAK E' VEL: 11.7 CM/SEC
BH CV ECHO MEAS - MV A MAX VEL: 80.9 CM/SEC
BH CV ECHO MEAS - MV DEC SLOPE: 895.5 CM/SEC^2
BH CV ECHO MEAS - MV DEC TIME: 0.16 SEC
BH CV ECHO MEAS - MV E MAX VEL: 99.2 CM/SEC
BH CV ECHO MEAS - MV E/A: 1.2
BH CV ECHO MEAS - MV P1/2T MAX VEL: 144.3 CM/SEC
BH CV ECHO MEAS - MV P1/2T: 47.2 MSEC
BH CV ECHO MEAS - MVA P1/2T LCG: 1.5 CM^2
BH CV ECHO MEAS - MVA(P1/2T): 4.7 CM^2
BH CV ECHO MEAS - PA ACC SLOPE: 576.3 CM/SEC^2
BH CV ECHO MEAS - PA ACC TIME: 0.16 SEC
BH CV ECHO MEAS - PA MAX PG (FULL): 2.8 MMHG
BH CV ECHO MEAS - PA MAX PG: 4.7 MMHG
BH CV ECHO MEAS - PA PR(ACCEL): 7.7 MMHG
BH CV ECHO MEAS - PA V2 MAX: 108.6 CM/SEC
BH CV ECHO MEAS - RAP SYSTOLE: 3 MMHG
BH CV ECHO MEAS - RV MAX PG: 1.9 MMHG
BH CV ECHO MEAS - RV V1 MAX: 69.6 CM/SEC
BH CV ECHO MEAS - RVSP: 21 MMHG
BH CV ECHO MEAS - SI(AO): 97.5 ML/M^2
BH CV ECHO MEAS - SI(CUBED): 18.2 ML/M^2
BH CV ECHO MEAS - SI(LVOT): 30.6 ML/M^2
BH CV ECHO MEAS - SI(MOD-SP2): 26.5 ML/M^2
BH CV ECHO MEAS - SI(MOD-SP4): 30.3 ML/M^2
BH CV ECHO MEAS - SI(TEICH): 18.3 ML/M^2
BH CV ECHO MEAS - SV(AO): 205.9 ML
BH CV ECHO MEAS - SV(CUBED): 38.5 ML
BH CV ECHO MEAS - SV(LVOT): 64.5 ML
BH CV ECHO MEAS - SV(MOD-SP2): 56 ML
BH CV ECHO MEAS - SV(MOD-SP4): 64 ML
BH CV ECHO MEAS - SV(TEICH): 38.6 ML
BH CV ECHO MEAS - TAPSE (>1.6): 2.1 CM2
BH CV ECHO MEAS - TR MAX V: 18 MMHG
BH CV ECHO MEAS - TR MAX VEL: 210 CM/SEC
BH CV ECHO MEASUREMENTS AVERAGE E/E' RATIO: 7.81
BH CV XLRA - RV BASE: 3.1 CM
BH CV XLRA - RV LENGTH: 7 CM
BH CV XLRA - RV MID: 2.6 CM
BH CV XLRA - TDI S': 11 CM/SEC
BILIRUB SERPL-MCNC: 0.2 MG/DL (ref 0.3–1.2)
BILIRUB UR QL STRIP: NEGATIVE
BUN BLD-MCNC: 10 MG/DL (ref 9–23)
BUN/CREAT SERPL: 16.7 (ref 7–25)
CALCIUM SPEC-SCNC: 9.2 MG/DL (ref 8.7–10.4)
CHLORIDE SERPL-SCNC: 99 MMOL/L (ref 99–109)
CK SERPL-CCNC: 710 U/L (ref 26–174)
CK SERPL-CCNC: 953 U/L (ref 26–174)
CLARITY UR: ABNORMAL
CO2 SERPL-SCNC: 28 MMOL/L (ref 20–31)
COLOR UR: YELLOW
CREAT BLD-MCNC: 0.6 MG/DL (ref 0.6–1.3)
D-LACTATE SERPL-SCNC: 2 MMOL/L (ref 0.5–2)
DEPRECATED RDW RBC AUTO: 50.1 FL (ref 37–54)
DEPRECATED RDW RBC AUTO: 50.6 FL (ref 37–54)
EOSINOPHIL # BLD AUTO: 0.16 10*3/MM3 (ref 0–0.3)
EOSINOPHIL # BLD AUTO: 0.18 10*3/MM3 (ref 0–0.3)
EOSINOPHIL NFR BLD AUTO: 0.9 % (ref 0–3)
EOSINOPHIL NFR BLD AUTO: 1.3 % (ref 0–3)
ERYTHROCYTE [DISTWIDTH] IN BLOOD BY AUTOMATED COUNT: 16.8 % (ref 11.3–14.5)
ERYTHROCYTE [DISTWIDTH] IN BLOOD BY AUTOMATED COUNT: 16.9 % (ref 11.3–14.5)
FERRITIN SERPL-MCNC: 46 NG/ML (ref 10–291)
FOLATE SERPL-MCNC: 20.02 NG/ML (ref 3.2–20)
GFR SERPL CREATININE-BSD FRML MDRD: 121 ML/MIN/1.73
GLOBULIN UR ELPH-MCNC: 3 GM/DL
GLUCOSE BLD-MCNC: 99 MG/DL (ref 70–100)
GLUCOSE UR STRIP-MCNC: NEGATIVE MG/DL
HCT VFR BLD AUTO: 24.2 % (ref 34.5–44)
HCT VFR BLD AUTO: 29.4 % (ref 34.5–44)
HGB BLD-MCNC: 7.8 G/DL (ref 11.5–15.5)
HGB BLD-MCNC: 9.3 G/DL (ref 11.5–15.5)
HGB UR QL STRIP.AUTO: NEGATIVE
HYALINE CASTS UR QL AUTO: NORMAL /LPF
IMM GRANULOCYTES # BLD: 0.58 10*3/MM3 (ref 0–0.03)
IMM GRANULOCYTES # BLD: 1.12 10*3/MM3 (ref 0–0.03)
IMM GRANULOCYTES NFR BLD: 4.1 % (ref 0–0.6)
IMM GRANULOCYTES NFR BLD: 6.2 % (ref 0–0.6)
IRON 24H UR-MRATE: 21 MCG/DL (ref 50–175)
IRON SATN MFR SERPL: 6 % (ref 15–50)
KETONES UR QL STRIP: NEGATIVE
LDH SERPL-CCNC: 351 U/L (ref 120–246)
LEFT ATRIUM VOLUME INDEX: 24.6 ML/M2
LEFT ATRIUM VOLUME: 52 CM3
LEUKOCYTE ESTERASE UR QL STRIP.AUTO: NEGATIVE
LV EF 2D ECHO EST: 65 %
LYMPHOCYTES # BLD AUTO: 3.39 10*3/MM3 (ref 0.6–4.8)
LYMPHOCYTES # BLD AUTO: 4.73 10*3/MM3 (ref 0.6–4.8)
LYMPHOCYTES NFR BLD AUTO: 24 % (ref 24–44)
LYMPHOCYTES NFR BLD AUTO: 26.3 % (ref 24–44)
MCH RBC QN AUTO: 26.6 PG (ref 27–31)
MCH RBC QN AUTO: 27.1 PG (ref 27–31)
MCHC RBC AUTO-ENTMCNC: 31.6 G/DL (ref 32–36)
MCHC RBC AUTO-ENTMCNC: 32.2 G/DL (ref 32–36)
MCV RBC AUTO: 84 FL (ref 80–99)
MCV RBC AUTO: 84 FL (ref 80–99)
MONOCYTES # BLD AUTO: 1.31 10*3/MM3 (ref 0–1)
MONOCYTES # BLD AUTO: 1.61 10*3/MM3 (ref 0–1)
MONOCYTES NFR BLD AUTO: 8.9 % (ref 0–12)
MONOCYTES NFR BLD AUTO: 9.3 % (ref 0–12)
NEUTROPHILS # BLD AUTO: 11.38 10*3/MM3 (ref 1.5–8.3)
NEUTROPHILS # BLD AUTO: 8.59 10*3/MM3 (ref 1.5–8.3)
NEUTROPHILS NFR BLD AUTO: 60.9 % (ref 41–71)
NEUTROPHILS NFR BLD AUTO: 63.3 % (ref 41–71)
NITRITE UR QL STRIP: NEGATIVE
PH UR STRIP.AUTO: 7.5 [PH] (ref 5–8)
PLAT MORPH BLD: NORMAL
PLATELET # BLD AUTO: 381 10*3/MM3 (ref 150–450)
PLATELET # BLD AUTO: 463 10*3/MM3 (ref 150–450)
PMV BLD AUTO: 7.9 FL (ref 6–12)
PMV BLD AUTO: 8.8 FL (ref 6–12)
POTASSIUM BLD-SCNC: 4.5 MMOL/L (ref 3.5–5.5)
PROT SERPL-MCNC: 6.5 G/DL (ref 5.7–8.2)
PROT UR QL STRIP: NEGATIVE
RBC # BLD AUTO: 2.88 10*6/MM3 (ref 3.89–5.14)
RBC # BLD AUTO: 3.5 10*6/MM3 (ref 3.89–5.14)
RBC # UR: NORMAL /HPF
RBC MORPH BLD: NORMAL
REF LAB TEST METHOD: NORMAL
RETICS/RBC NFR AUTO: 4.11 % (ref 0.5–1.5)
SODIUM BLD-SCNC: 135 MMOL/L (ref 132–146)
SP GR UR STRIP: 1.02 (ref 1–1.03)
SQUAMOUS #/AREA URNS HPF: NORMAL /HPF
TIBC SERPL-MCNC: 353 MCG/DL (ref 250–450)
TROPONIN I SERPL-MCNC: 0.02 NG/ML (ref 0–0.07)
TSH SERPL DL<=0.05 MIU/L-ACNC: 2.16 MIU/ML (ref 0.35–5.35)
UROBILINOGEN UR QL STRIP: ABNORMAL
VIT B12 BLD-MCNC: 613 PG/ML (ref 211–911)
WBC MORPH BLD: NORMAL
WBC NRBC COR # BLD: 14.11 10*3/MM3 (ref 3.5–10.8)
WBC NRBC COR # BLD: 17.99 10*3/MM3 (ref 3.5–10.8)
WBC UR QL AUTO: NORMAL /HPF

## 2018-05-02 PROCEDURE — 83540 ASSAY OF IRON: CPT | Performed by: NURSE PRACTITIONER

## 2018-05-02 PROCEDURE — 85045 AUTOMATED RETICULOCYTE COUNT: CPT | Performed by: NURSE PRACTITIONER

## 2018-05-02 PROCEDURE — 83615 LACTATE (LD) (LDH) ENZYME: CPT | Performed by: NURSE PRACTITIONER

## 2018-05-02 PROCEDURE — 83010 ASSAY OF HAPTOGLOBIN QUANT: CPT | Performed by: NURSE PRACTITIONER

## 2018-05-02 PROCEDURE — 82728 ASSAY OF FERRITIN: CPT | Performed by: NURSE PRACTITIONER

## 2018-05-02 PROCEDURE — 85060 BLOOD SMEAR INTERPRETATION: CPT | Performed by: NURSE PRACTITIONER

## 2018-05-02 PROCEDURE — 85025 COMPLETE CBC W/AUTO DIFF WBC: CPT | Performed by: NURSE PRACTITIONER

## 2018-05-02 PROCEDURE — 82550 ASSAY OF CK (CPK): CPT | Performed by: NURSE PRACTITIONER

## 2018-05-02 PROCEDURE — 74176 CT ABD & PELVIS W/O CONTRAST: CPT

## 2018-05-02 PROCEDURE — 97163 PT EVAL HIGH COMPLEX 45 MIN: CPT

## 2018-05-02 PROCEDURE — 93306 TTE W/DOPPLER COMPLETE: CPT

## 2018-05-02 PROCEDURE — 85007 BL SMEAR W/DIFF WBC COUNT: CPT | Performed by: NURSE PRACTITIONER

## 2018-05-02 PROCEDURE — 86038 ANTINUCLEAR ANTIBODIES: CPT | Performed by: NURSE PRACTITIONER

## 2018-05-02 PROCEDURE — 83605 ASSAY OF LACTIC ACID: CPT | Performed by: NURSE PRACTITIONER

## 2018-05-02 PROCEDURE — 82747 ASSAY OF FOLIC ACID RBC: CPT | Performed by: NURSE PRACTITIONER

## 2018-05-02 PROCEDURE — 83550 IRON BINDING TEST: CPT | Performed by: NURSE PRACTITIONER

## 2018-05-02 PROCEDURE — 99223 1ST HOSP IP/OBS HIGH 75: CPT | Performed by: NURSE PRACTITIONER

## 2018-05-02 PROCEDURE — 25010000002 LORAZEPAM PER 2 MG: Performed by: INTERNAL MEDICINE

## 2018-05-02 PROCEDURE — 25010000002 ENOXAPARIN PER 10 MG: Performed by: NURSE PRACTITIONER

## 2018-05-02 PROCEDURE — 82608 B-12 BINDING CAPACITY: CPT | Performed by: NURSE PRACTITIONER

## 2018-05-02 PROCEDURE — 99223 1ST HOSP IP/OBS HIGH 75: CPT | Performed by: FAMILY MEDICINE

## 2018-05-02 PROCEDURE — 80053 COMPREHEN METABOLIC PANEL: CPT | Performed by: NURSE PRACTITIONER

## 2018-05-02 PROCEDURE — 87040 BLOOD CULTURE FOR BACTERIA: CPT | Performed by: NURSE PRACTITIONER

## 2018-05-02 PROCEDURE — 85014 HEMATOCRIT: CPT | Performed by: NURSE PRACTITIONER

## 2018-05-02 PROCEDURE — 82746 ASSAY OF FOLIC ACID SERUM: CPT | Performed by: NURSE PRACTITIONER

## 2018-05-02 PROCEDURE — 25010000002 NA FERRIC GLUC CPLX PER 12.5 MG: Performed by: NURSE PRACTITIONER

## 2018-05-02 PROCEDURE — 84484 ASSAY OF TROPONIN QUANT: CPT

## 2018-05-02 PROCEDURE — 84443 ASSAY THYROID STIM HORMONE: CPT | Performed by: NURSE PRACTITIONER

## 2018-05-02 PROCEDURE — 82607 VITAMIN B-12: CPT | Performed by: NURSE PRACTITIONER

## 2018-05-02 RX ORDER — CALCIUM POLYCARBOPHIL 625 MG
1250 TABLET ORAL 2 TIMES DAILY
Status: DISCONTINUED | OUTPATIENT
Start: 2018-05-02 | End: 2018-05-16 | Stop reason: HOSPADM

## 2018-05-02 RX ORDER — CLARITHROMYCIN 250 MG/1
500 TABLET, FILM COATED ORAL EVERY 12 HOURS SCHEDULED
Status: DISPENSED | OUTPATIENT
Start: 2018-05-02 | End: 2018-05-16

## 2018-05-02 RX ORDER — ESCITALOPRAM OXALATE 10 MG/1
10 TABLET ORAL EVERY MORNING
Status: DISCONTINUED | OUTPATIENT
Start: 2018-05-02 | End: 2018-05-16 | Stop reason: HOSPADM

## 2018-05-02 RX ORDER — DIVALPROEX SODIUM 500 MG/1
1000 TABLET, EXTENDED RELEASE ORAL NIGHTLY
Status: DISCONTINUED | OUTPATIENT
Start: 2018-05-02 | End: 2018-05-16 | Stop reason: HOSPADM

## 2018-05-02 RX ORDER — LORAZEPAM 2 MG/ML
1 INJECTION INTRAMUSCULAR ONCE
Status: COMPLETED | OUTPATIENT
Start: 2018-05-02 | End: 2018-05-02

## 2018-05-02 RX ORDER — SODIUM CHLORIDE 0.9 % (FLUSH) 0.9 %
1-10 SYRINGE (ML) INJECTION AS NEEDED
Status: DISCONTINUED | OUTPATIENT
Start: 2018-05-02 | End: 2018-05-16 | Stop reason: HOSPADM

## 2018-05-02 RX ORDER — HALOPERIDOL 5 MG/1
5 TABLET ORAL 2 TIMES DAILY PRN
Status: DISCONTINUED | OUTPATIENT
Start: 2018-05-02 | End: 2018-05-16 | Stop reason: HOSPADM

## 2018-05-02 RX ORDER — SODIUM CHLORIDE 9 MG/ML
125 INJECTION, SOLUTION INTRAVENOUS CONTINUOUS
Status: DISCONTINUED | OUTPATIENT
Start: 2018-05-02 | End: 2018-05-04

## 2018-05-02 RX ORDER — CALCIUM CARBONATE 200(500)MG
1 TABLET,CHEWABLE ORAL AS NEEDED
Status: DISCONTINUED | OUTPATIENT
Start: 2018-05-02 | End: 2018-05-16 | Stop reason: HOSPADM

## 2018-05-02 RX ORDER — ALBUTEROL SULFATE 90 UG/1
2 AEROSOL, METERED RESPIRATORY (INHALATION) EVERY 4 HOURS PRN
Status: DISCONTINUED | OUTPATIENT
Start: 2018-05-02 | End: 2018-05-02 | Stop reason: CLARIF

## 2018-05-02 RX ORDER — PANTOPRAZOLE SODIUM 40 MG/1
40 TABLET, DELAYED RELEASE ORAL DAILY
Status: DISCONTINUED | OUTPATIENT
Start: 2018-05-02 | End: 2018-05-16 | Stop reason: HOSPADM

## 2018-05-02 RX ORDER — ACETAMINOPHEN 325 MG/1
650 TABLET ORAL EVERY 6 HOURS PRN
Status: DISCONTINUED | OUTPATIENT
Start: 2018-05-02 | End: 2018-05-16 | Stop reason: HOSPADM

## 2018-05-02 RX ORDER — PROPRANOLOL HYDROCHLORIDE 20 MG/1
20 TABLET ORAL EVERY 8 HOURS SCHEDULED
Status: DISCONTINUED | OUTPATIENT
Start: 2018-05-02 | End: 2018-05-16 | Stop reason: HOSPADM

## 2018-05-02 RX ORDER — TRAZODONE HYDROCHLORIDE 50 MG/1
50 TABLET ORAL NIGHTLY
Status: DISCONTINUED | OUTPATIENT
Start: 2018-05-02 | End: 2018-05-16 | Stop reason: HOSPADM

## 2018-05-02 RX ORDER — ATORVASTATIN CALCIUM 20 MG/1
20 TABLET, FILM COATED ORAL NIGHTLY
Status: DISCONTINUED | OUTPATIENT
Start: 2018-05-02 | End: 2018-05-04

## 2018-05-02 RX ORDER — ALBUTEROL SULFATE 2.5 MG/3ML
2.5 SOLUTION RESPIRATORY (INHALATION) EVERY 4 HOURS PRN
Status: DISCONTINUED | OUTPATIENT
Start: 2018-05-02 | End: 2018-05-16 | Stop reason: HOSPADM

## 2018-05-02 RX ORDER — LORAZEPAM 0.5 MG/1
0.5 TABLET ORAL EVERY 8 HOURS PRN
Status: DISCONTINUED | OUTPATIENT
Start: 2018-05-02 | End: 2018-05-16 | Stop reason: HOSPADM

## 2018-05-02 RX ORDER — BENZTROPINE MESYLATE 1 MG/1
2 TABLET ORAL EVERY 12 HOURS SCHEDULED
Status: DISCONTINUED | OUTPATIENT
Start: 2018-05-02 | End: 2018-05-16 | Stop reason: HOSPADM

## 2018-05-02 RX ORDER — METRONIDAZOLE 500 MG/1
500 TABLET ORAL EVERY 8 HOURS SCHEDULED
Status: DISPENSED | OUTPATIENT
Start: 2018-05-02 | End: 2018-05-16

## 2018-05-02 RX ORDER — CHLORPROMAZINE HYDROCHLORIDE 25 MG/1
25 TABLET, FILM COATED ORAL EVERY 8 HOURS SCHEDULED
Status: DISCONTINUED | OUTPATIENT
Start: 2018-05-02 | End: 2018-05-16 | Stop reason: HOSPADM

## 2018-05-02 RX ORDER — HYDROCODONE BITARTRATE AND ACETAMINOPHEN 5; 325 MG/1; MG/1
1 TABLET ORAL ONCE
Status: COMPLETED | OUTPATIENT
Start: 2018-05-02 | End: 2018-05-02

## 2018-05-02 RX ORDER — DIVALPROEX SODIUM 500 MG/1
500 TABLET, EXTENDED RELEASE ORAL DAILY
Status: DISCONTINUED | OUTPATIENT
Start: 2018-05-02 | End: 2018-05-16 | Stop reason: HOSPADM

## 2018-05-02 RX ORDER — SODIUM CHLORIDE 9 MG/ML
250 INJECTION, SOLUTION INTRAVENOUS CONTINUOUS
Status: DISCONTINUED | OUTPATIENT
Start: 2018-05-02 | End: 2018-05-02 | Stop reason: ALTCHOICE

## 2018-05-02 RX ORDER — FOLIC ACID 1 MG/1
1 TABLET ORAL DAILY
Status: DISCONTINUED | OUTPATIENT
Start: 2018-05-02 | End: 2018-05-16 | Stop reason: HOSPADM

## 2018-05-02 RX ADMIN — BENZTROPINE MESYLATE 2 MG: 1 TABLET ORAL at 09:38

## 2018-05-02 RX ADMIN — SODIUM CHLORIDE 250 ML/HR: 9 INJECTION, SOLUTION INTRAVENOUS at 05:06

## 2018-05-02 RX ADMIN — ESCITALOPRAM OXALATE 10 MG: 10 TABLET ORAL at 09:37

## 2018-05-02 RX ADMIN — SODIUM CHLORIDE 1000 ML: 9 INJECTION, SOLUTION INTRAVENOUS at 01:27

## 2018-05-02 RX ADMIN — PROPRANOLOL HYDROCHLORIDE 20 MG: 20 TABLET ORAL at 09:39

## 2018-05-02 RX ADMIN — METRONIDAZOLE 500 MG: 500 TABLET ORAL at 21:18

## 2018-05-02 RX ADMIN — PROPRANOLOL HYDROCHLORIDE 20 MG: 20 TABLET ORAL at 13:32

## 2018-05-02 RX ADMIN — METRONIDAZOLE 500 MG: 500 TABLET ORAL at 09:37

## 2018-05-02 RX ADMIN — HYDROCODONE BITARTRATE AND ACETAMINOPHEN 1 TABLET: 5; 325 TABLET ORAL at 02:11

## 2018-05-02 RX ADMIN — CHLORPROMAZINE HYDROCHLORIDE 25 MG: 25 TABLET, SUGAR COATED ORAL at 13:32

## 2018-05-02 RX ADMIN — PANTOPRAZOLE SODIUM 40 MG: 40 TABLET, DELAYED RELEASE ORAL at 09:38

## 2018-05-02 RX ADMIN — CHLORPROMAZINE HYDROCHLORIDE 25 MG: 25 TABLET, SUGAR COATED ORAL at 09:38

## 2018-05-02 RX ADMIN — POLYETHYLENE GLYCOL (3350) 17 G: 17 POWDER, FOR SOLUTION ORAL at 09:38

## 2018-05-02 RX ADMIN — CLARITHROMYCIN 500 MG: 250 TABLET, FILM COATED ORAL at 21:18

## 2018-05-02 RX ADMIN — ACETAMINOPHEN 650 MG: 325 TABLET ORAL at 16:03

## 2018-05-02 RX ADMIN — ENOXAPARIN SODIUM 40 MG: 40 INJECTION SUBCUTANEOUS at 09:37

## 2018-05-02 RX ADMIN — BENZTROPINE MESYLATE 2 MG: 1 TABLET ORAL at 21:19

## 2018-05-02 RX ADMIN — LORAZEPAM 1 MG: 2 INJECTION INTRAMUSCULAR; INTRAVENOUS at 17:59

## 2018-05-02 RX ADMIN — SODIUM CHLORIDE 1000 ML: 9 INJECTION, SOLUTION INTRAVENOUS at 00:09

## 2018-05-02 RX ADMIN — CALCIUM POLYCARBOPHIL 1250 MG: 625 TABLET, FILM COATED ORAL at 09:38

## 2018-05-02 RX ADMIN — TRAZODONE HYDROCHLORIDE 50 MG: 50 TABLET ORAL at 21:18

## 2018-05-02 RX ADMIN — SODIUM CHLORIDE 125 MG: 9 INJECTION, SOLUTION INTRAVENOUS at 13:31

## 2018-05-02 RX ADMIN — CALCIUM POLYCARBOPHIL 1250 MG: 625 TABLET, FILM COATED ORAL at 21:19

## 2018-05-02 RX ADMIN — DIVALPROEX SODIUM 1000 MG: 500 TABLET, FILM COATED, EXTENDED RELEASE ORAL at 21:18

## 2018-05-02 RX ADMIN — LORAZEPAM 1 MG: 2 INJECTION INTRAMUSCULAR; INTRAVENOUS at 22:56

## 2018-05-02 RX ADMIN — DIVALPROEX SODIUM 500 MG: 500 TABLET, FILM COATED, EXTENDED RELEASE ORAL at 09:39

## 2018-05-02 RX ADMIN — CLARITHROMYCIN 500 MG: 250 TABLET, FILM COATED ORAL at 09:38

## 2018-05-02 RX ADMIN — PROPRANOLOL HYDROCHLORIDE 20 MG: 20 TABLET ORAL at 21:19

## 2018-05-02 RX ADMIN — ATORVASTATIN CALCIUM 20 MG: 20 TABLET, FILM COATED ORAL at 21:19

## 2018-05-02 RX ADMIN — SODIUM CHLORIDE 125 ML/HR: 9 INJECTION, SOLUTION INTRAVENOUS at 09:37

## 2018-05-02 RX ADMIN — LORAZEPAM 0.5 MG: 0.5 TABLET ORAL at 17:20

## 2018-05-02 RX ADMIN — FOLIC ACID 1 MG: 1 TABLET ORAL at 09:37

## 2018-05-02 RX ADMIN — CHLORPROMAZINE HYDROCHLORIDE 25 MG: 25 TABLET, SUGAR COATED ORAL at 21:19

## 2018-05-02 RX ADMIN — METRONIDAZOLE 500 MG: 500 TABLET ORAL at 13:34

## 2018-05-03 ENCOUNTER — APPOINTMENT (OUTPATIENT)
Dept: GENERAL RADIOLOGY | Facility: HOSPITAL | Age: 28
End: 2018-05-03
Attending: HOSPITALIST

## 2018-05-03 LAB
ANA SER QL: NEGATIVE
BASOPHILS # BLD AUTO: 0.08 10*3/MM3 (ref 0–0.2)
BASOPHILS NFR BLD AUTO: 0.5 % (ref 0–1)
CYTOLOGIST CVX/VAG CYTO: NORMAL
DEPRECATED RDW RBC AUTO: 51.6 FL (ref 37–54)
EOSINOPHIL # BLD AUTO: 0.21 10*3/MM3 (ref 0–0.3)
EOSINOPHIL NFR BLD AUTO: 1.4 % (ref 0–3)
ERYTHROCYTE [DISTWIDTH] IN BLOOD BY AUTOMATED COUNT: 17.2 % (ref 11.3–14.5)
FOLATE BLD-MCNC: 487.4 NG/ML
FOLATE RBC-MCNC: 1919 NG/ML
GGT SERPL-CCNC: 79 U/L (ref 0–37)
HAPTOGLOB SERPL-MCNC: 205 MG/DL (ref 34–200)
HCT VFR BLD AUTO: 25.4 % (ref 34–46.6)
HCT VFR BLD AUTO: 26.3 % (ref 34.5–44)
HGB BLD-MCNC: 8.6 G/DL (ref 11.5–15.5)
IMM GRANULOCYTES # BLD: 0.63 10*3/MM3 (ref 0–0.03)
IMM GRANULOCYTES NFR BLD: 4.2 % (ref 0–0.6)
LYMPHOCYTES # BLD AUTO: 4.74 10*3/MM3 (ref 0.6–4.8)
LYMPHOCYTES NFR BLD AUTO: 31.4 % (ref 24–44)
MCH RBC QN AUTO: 27.7 PG (ref 27–31)
MCHC RBC AUTO-ENTMCNC: 32.7 G/DL (ref 32–36)
MCV RBC AUTO: 84.6 FL (ref 80–99)
MONOCYTES # BLD AUTO: 0.99 10*3/MM3 (ref 0–1)
MONOCYTES NFR BLD AUTO: 6.6 % (ref 0–12)
NEUTROPHILS # BLD AUTO: 8.44 10*3/MM3 (ref 1.5–8.3)
NEUTROPHILS NFR BLD AUTO: 55.9 % (ref 41–71)
PATH INTERP BLD-IMP: NORMAL
PLATELET # BLD AUTO: 430 10*3/MM3 (ref 150–450)
PMV BLD AUTO: 8 FL (ref 6–12)
RBC # BLD AUTO: 3.11 10*6/MM3 (ref 3.89–5.14)
WBC NRBC COR # BLD: 15.09 10*3/MM3 (ref 3.5–10.8)

## 2018-05-03 PROCEDURE — 82977 ASSAY OF GGT: CPT | Performed by: NURSE PRACTITIONER

## 2018-05-03 PROCEDURE — 25010000002 KETOROLAC TROMETHAMINE PER 15 MG: Performed by: HOSPITALIST

## 2018-05-03 PROCEDURE — 25010000002 NA FERRIC GLUC CPLX PER 12.5 MG: Performed by: NURSE PRACTITIONER

## 2018-05-03 PROCEDURE — 92611 MOTION FLUOROSCOPY/SWALLOW: CPT

## 2018-05-03 PROCEDURE — 83010 ASSAY OF HAPTOGLOBIN QUANT: CPT | Performed by: NURSE PRACTITIONER

## 2018-05-03 PROCEDURE — 97166 OT EVAL MOD COMPLEX 45 MIN: CPT | Performed by: OCCUPATIONAL THERAPIST

## 2018-05-03 PROCEDURE — 74230 X-RAY XM SWLNG FUNCJ C+: CPT

## 2018-05-03 PROCEDURE — 25010000002 ENOXAPARIN PER 10 MG: Performed by: NURSE PRACTITIONER

## 2018-05-03 PROCEDURE — 83516 IMMUNOASSAY NONANTIBODY: CPT | Performed by: NURSE PRACTITIONER

## 2018-05-03 PROCEDURE — 99233 SBSQ HOSP IP/OBS HIGH 50: CPT | Performed by: HOSPITALIST

## 2018-05-03 PROCEDURE — 99232 SBSQ HOSP IP/OBS MODERATE 35: CPT | Performed by: INTERNAL MEDICINE

## 2018-05-03 PROCEDURE — 84238 ASSAY NONENDOCRINE RECEPTOR: CPT | Performed by: NURSE PRACTITIONER

## 2018-05-03 PROCEDURE — 25010000002 LORAZEPAM PER 2 MG: Performed by: HOSPITALIST

## 2018-05-03 PROCEDURE — 85025 COMPLETE CBC W/AUTO DIFF WBC: CPT | Performed by: HOSPITALIST

## 2018-05-03 PROCEDURE — 92610 EVALUATE SWALLOWING FUNCTION: CPT

## 2018-05-03 PROCEDURE — 25010000002 ZIPRASIDONE MESYLATE PER 10 MG: Performed by: INTERNAL MEDICINE

## 2018-05-03 RX ORDER — LORAZEPAM 2 MG/ML
1 INJECTION INTRAMUSCULAR ONCE
Status: COMPLETED | OUTPATIENT
Start: 2018-05-03 | End: 2018-05-03

## 2018-05-03 RX ORDER — KETOROLAC TROMETHAMINE 30 MG/ML
15 INJECTION, SOLUTION INTRAMUSCULAR; INTRAVENOUS EVERY 12 HOURS
Status: DISPENSED | OUTPATIENT
Start: 2018-05-03 | End: 2018-05-05

## 2018-05-03 RX ORDER — LORAZEPAM 2 MG/ML
0.5 INJECTION INTRAMUSCULAR ONCE
Status: DISCONTINUED | OUTPATIENT
Start: 2018-05-03 | End: 2018-05-04

## 2018-05-03 RX ORDER — ZIPRASIDONE MESYLATE 20 MG/ML
10 INJECTION, POWDER, LYOPHILIZED, FOR SOLUTION INTRAMUSCULAR ONCE
Status: COMPLETED | OUTPATIENT
Start: 2018-05-03 | End: 2018-05-03

## 2018-05-03 RX ORDER — SODIUM CHLORIDE 9 MG/ML
125 INJECTION, SOLUTION INTRAVENOUS CONTINUOUS
Status: DISCONTINUED | OUTPATIENT
Start: 2018-05-03 | End: 2018-05-16 | Stop reason: HOSPADM

## 2018-05-03 RX ADMIN — LORAZEPAM 1 MG: 2 INJECTION INTRAMUSCULAR; INTRAVENOUS at 10:49

## 2018-05-03 RX ADMIN — PROPRANOLOL HYDROCHLORIDE 20 MG: 20 TABLET ORAL at 06:13

## 2018-05-03 RX ADMIN — CHLORPROMAZINE HYDROCHLORIDE 25 MG: 25 TABLET, SUGAR COATED ORAL at 06:13

## 2018-05-03 RX ADMIN — METRONIDAZOLE 500 MG: 500 TABLET ORAL at 14:15

## 2018-05-03 RX ADMIN — SODIUM CHLORIDE 75 ML/HR: 9 INJECTION, SOLUTION INTRAVENOUS at 09:59

## 2018-05-03 RX ADMIN — CHLORPROMAZINE HYDROCHLORIDE 25 MG: 25 TABLET, SUGAR COATED ORAL at 14:15

## 2018-05-03 RX ADMIN — ATORVASTATIN CALCIUM 20 MG: 20 TABLET, FILM COATED ORAL at 22:05

## 2018-05-03 RX ADMIN — PANTOPRAZOLE SODIUM 40 MG: 40 TABLET, DELAYED RELEASE ORAL at 06:13

## 2018-05-03 RX ADMIN — METRONIDAZOLE 500 MG: 500 TABLET ORAL at 06:13

## 2018-05-03 RX ADMIN — TRAZODONE HYDROCHLORIDE 50 MG: 50 TABLET ORAL at 21:00

## 2018-05-03 RX ADMIN — PROPRANOLOL HYDROCHLORIDE 20 MG: 20 TABLET ORAL at 22:03

## 2018-05-03 RX ADMIN — PROPRANOLOL HYDROCHLORIDE 20 MG: 20 TABLET ORAL at 14:15

## 2018-05-03 RX ADMIN — ZIPRASIDONE MESYLATE 10 MG: 20 INJECTION, POWDER, LYOPHILIZED, FOR SOLUTION INTRAMUSCULAR at 21:35

## 2018-05-03 RX ADMIN — SODIUM CHLORIDE 75 ML/HR: 9 INJECTION, SOLUTION INTRAVENOUS at 23:02

## 2018-05-03 RX ADMIN — BENZTROPINE MESYLATE 2 MG: 1 TABLET ORAL at 08:56

## 2018-05-03 RX ADMIN — CHLORPROMAZINE HYDROCHLORIDE 25 MG: 25 TABLET, SUGAR COATED ORAL at 22:05

## 2018-05-03 RX ADMIN — SODIUM CHLORIDE 125 MG: 9 INJECTION, SOLUTION INTRAVENOUS at 11:12

## 2018-05-03 RX ADMIN — ENOXAPARIN SODIUM 40 MG: 40 INJECTION SUBCUTANEOUS at 22:07

## 2018-05-03 RX ADMIN — KETOROLAC TROMETHAMINE 15 MG: 30 INJECTION, SOLUTION INTRAMUSCULAR at 22:06

## 2018-05-03 RX ADMIN — CALCIUM POLYCARBOPHIL 1250 MG: 625 TABLET, FILM COATED ORAL at 21:00

## 2018-05-03 RX ADMIN — KETOROLAC TROMETHAMINE 15 MG: 30 INJECTION, SOLUTION INTRAMUSCULAR at 08:58

## 2018-05-03 RX ADMIN — METRONIDAZOLE 500 MG: 500 TABLET ORAL at 22:08

## 2018-05-03 RX ADMIN — DIVALPROEX SODIUM 500 MG: 500 TABLET, FILM COATED, EXTENDED RELEASE ORAL at 08:57

## 2018-05-03 RX ADMIN — CLARITHROMYCIN 500 MG: 250 TABLET, FILM COATED ORAL at 21:00

## 2018-05-03 RX ADMIN — DIVALPROEX SODIUM 1000 MG: 500 TABLET, FILM COATED, EXTENDED RELEASE ORAL at 21:00

## 2018-05-03 RX ADMIN — BARIUM SULFATE 100 ML: 0.81 POWDER, FOR SUSPENSION ORAL at 15:08

## 2018-05-03 RX ADMIN — LORAZEPAM 0.5 MG: 0.5 TABLET ORAL at 02:50

## 2018-05-03 RX ADMIN — BARIUM SULFATE 20 ML: 400 PASTE ORAL at 15:08

## 2018-05-03 RX ADMIN — FOLIC ACID 1 MG: 1 TABLET ORAL at 08:55

## 2018-05-03 RX ADMIN — BENZTROPINE MESYLATE 2 MG: 1 TABLET ORAL at 21:00

## 2018-05-03 RX ADMIN — ESCITALOPRAM OXALATE 10 MG: 10 TABLET ORAL at 08:55

## 2018-05-04 LAB
ANION GAP SERPL CALCULATED.3IONS-SCNC: 8 MMOL/L (ref 3–11)
BASOPHILS # BLD AUTO: 0.1 10*3/MM3 (ref 0–0.2)
BASOPHILS NFR BLD AUTO: 0.7 % (ref 0–1)
BUN BLD-MCNC: 10 MG/DL (ref 9–23)
BUN/CREAT SERPL: 16.7 (ref 7–25)
CALCIUM SPEC-SCNC: 8.7 MG/DL (ref 8.7–10.4)
CHLORIDE SERPL-SCNC: 104 MMOL/L (ref 99–109)
CK SERPL-CCNC: 258 U/L (ref 26–174)
CO2 SERPL-SCNC: 25 MMOL/L (ref 20–31)
CREAT BLD-MCNC: 0.6 MG/DL (ref 0.6–1.3)
DEPRECATED MITOCHONDRIA M2 IGG SER-ACNC: <20 UNITS (ref 0–20)
DEPRECATED RDW RBC AUTO: 51.6 FL (ref 37–54)
EOSINOPHIL # BLD AUTO: 0.19 10*3/MM3 (ref 0–0.3)
EOSINOPHIL NFR BLD AUTO: 1.3 % (ref 0–3)
ERYTHROCYTE [DISTWIDTH] IN BLOOD BY AUTOMATED COUNT: 17.3 % (ref 11.3–14.5)
GFR SERPL CREATININE-BSD FRML MDRD: 121 ML/MIN/1.73
GLUCOSE BLD-MCNC: 106 MG/DL (ref 70–100)
HAPTOGLOB SERPL-MCNC: 204 MG/DL (ref 34–200)
HCT VFR BLD AUTO: 26.8 % (ref 34.5–44)
HGB BLD-MCNC: 8.5 G/DL (ref 11.5–15.5)
IMM GRANULOCYTES # BLD: 0.4 10*3/MM3 (ref 0–0.03)
IMM GRANULOCYTES NFR BLD: 2.7 % (ref 0–0.6)
LYMPHOCYTES # BLD AUTO: 3.66 10*3/MM3 (ref 0.6–4.8)
LYMPHOCYTES NFR BLD AUTO: 25.1 % (ref 24–44)
MCH RBC QN AUTO: 26.7 PG (ref 27–31)
MCHC RBC AUTO-ENTMCNC: 31.7 G/DL (ref 32–36)
MCV RBC AUTO: 84.3 FL (ref 80–99)
MONOCYTES # BLD AUTO: 1.11 10*3/MM3 (ref 0–1)
MONOCYTES NFR BLD AUTO: 7.6 % (ref 0–12)
NEUTROPHILS # BLD AUTO: 9.5 10*3/MM3 (ref 1.5–8.3)
NEUTROPHILS NFR BLD AUTO: 65.3 % (ref 41–71)
PLATELET # BLD AUTO: 468 10*3/MM3 (ref 150–450)
PMV BLD AUTO: 8.7 FL (ref 6–12)
POTASSIUM BLD-SCNC: 4.1 MMOL/L (ref 3.5–5.5)
RBC # BLD AUTO: 3.18 10*6/MM3 (ref 3.89–5.14)
SODIUM BLD-SCNC: 137 MMOL/L (ref 132–146)
WBC NRBC COR # BLD: 14.56 10*3/MM3 (ref 3.5–10.8)

## 2018-05-04 PROCEDURE — 82550 ASSAY OF CK (CPK): CPT | Performed by: HOSPITALIST

## 2018-05-04 PROCEDURE — 85025 COMPLETE CBC W/AUTO DIFF WBC: CPT | Performed by: HOSPITALIST

## 2018-05-04 PROCEDURE — 99232 SBSQ HOSP IP/OBS MODERATE 35: CPT | Performed by: NURSE PRACTITIONER

## 2018-05-04 PROCEDURE — 25010000002 LORAZEPAM PER 2 MG: Performed by: HOSPITALIST

## 2018-05-04 PROCEDURE — 97535 SELF CARE MNGMENT TRAINING: CPT

## 2018-05-04 PROCEDURE — 80048 BASIC METABOLIC PNL TOTAL CA: CPT | Performed by: HOSPITALIST

## 2018-05-04 PROCEDURE — 25010000002 KETOROLAC TROMETHAMINE PER 15 MG: Performed by: HOSPITALIST

## 2018-05-04 PROCEDURE — 25010000002 ENOXAPARIN PER 10 MG: Performed by: NURSE PRACTITIONER

## 2018-05-04 RX ORDER — CHLORPROMAZINE HYDROCHLORIDE 25 MG/1
25 TABLET, FILM COATED ORAL ONCE
Status: COMPLETED | OUTPATIENT
Start: 2018-05-04 | End: 2018-05-04

## 2018-05-04 RX ORDER — LORAZEPAM 2 MG/ML
2 INJECTION INTRAMUSCULAR ONCE
Status: DISCONTINUED | OUTPATIENT
Start: 2018-05-04 | End: 2018-05-11

## 2018-05-04 RX ORDER — LORAZEPAM 2 MG/ML
2 INJECTION INTRAMUSCULAR ONCE
Status: COMPLETED | OUTPATIENT
Start: 2018-05-04 | End: 2018-05-04

## 2018-05-04 RX ADMIN — HALOPERIDOL 5 MG: 5 TABLET ORAL at 10:53

## 2018-05-04 RX ADMIN — LORAZEPAM 2 MG: 2 INJECTION INTRAMUSCULAR; INTRAVENOUS at 20:51

## 2018-05-04 RX ADMIN — KETOROLAC TROMETHAMINE 15 MG: 30 INJECTION, SOLUTION INTRAMUSCULAR at 08:25

## 2018-05-04 RX ADMIN — PROPRANOLOL HYDROCHLORIDE 20 MG: 20 TABLET ORAL at 14:11

## 2018-05-04 RX ADMIN — ENOXAPARIN SODIUM 40 MG: 40 INJECTION SUBCUTANEOUS at 20:20

## 2018-05-04 RX ADMIN — TRAZODONE HYDROCHLORIDE 50 MG: 50 TABLET ORAL at 20:20

## 2018-05-04 RX ADMIN — CHLORPROMAZINE HYDROCHLORIDE 25 MG: 25 TABLET, SUGAR COATED ORAL at 21:45

## 2018-05-04 RX ADMIN — METRONIDAZOLE 500 MG: 500 TABLET ORAL at 21:45

## 2018-05-04 RX ADMIN — METRONIDAZOLE 500 MG: 500 TABLET ORAL at 13:47

## 2018-05-04 RX ADMIN — PROPRANOLOL HYDROCHLORIDE 20 MG: 20 TABLET ORAL at 21:46

## 2018-05-04 RX ADMIN — CALCIUM POLYCARBOPHIL 1250 MG: 625 TABLET, FILM COATED ORAL at 08:25

## 2018-05-04 RX ADMIN — BENZTROPINE MESYLATE 2 MG: 1 TABLET ORAL at 20:18

## 2018-05-04 RX ADMIN — ESCITALOPRAM OXALATE 10 MG: 10 TABLET ORAL at 08:25

## 2018-05-04 RX ADMIN — CLARITHROMYCIN 500 MG: 250 TABLET, FILM COATED ORAL at 08:25

## 2018-05-04 RX ADMIN — FOLIC ACID 1 MG: 1 TABLET ORAL at 08:25

## 2018-05-04 RX ADMIN — DIVALPROEX SODIUM 1000 MG: 500 TABLET, FILM COATED, EXTENDED RELEASE ORAL at 20:19

## 2018-05-04 RX ADMIN — DIVALPROEX SODIUM 500 MG: 500 TABLET, FILM COATED, EXTENDED RELEASE ORAL at 08:25

## 2018-05-04 RX ADMIN — CHLORPROMAZINE HYDROCHLORIDE 25 MG: 25 TABLET, SUGAR COATED ORAL at 14:11

## 2018-05-04 RX ADMIN — CHLORPROMAZINE HYDROCHLORIDE 25 MG: 25 TABLET, SUGAR COATED ORAL at 10:13

## 2018-05-04 RX ADMIN — CLARITHROMYCIN 500 MG: 250 TABLET, FILM COATED ORAL at 20:20

## 2018-05-04 RX ADMIN — BENZTROPINE MESYLATE 2 MG: 1 TABLET ORAL at 09:00

## 2018-05-05 LAB
ANION GAP SERPL CALCULATED.3IONS-SCNC: 9 MMOL/L (ref 3–11)
BASOPHILS # BLD AUTO: 0.06 10*3/MM3 (ref 0–0.2)
BASOPHILS NFR BLD AUTO: 0.5 % (ref 0–1)
BUN BLD-MCNC: 11 MG/DL (ref 9–23)
BUN/CREAT SERPL: 18.3 (ref 7–25)
CALCIUM SPEC-SCNC: 9.1 MG/DL (ref 8.7–10.4)
CHLORIDE SERPL-SCNC: 102 MMOL/L (ref 99–109)
CO2 SERPL-SCNC: 27 MMOL/L (ref 20–31)
CREAT BLD-MCNC: 0.6 MG/DL (ref 0.6–1.3)
DEPRECATED RDW RBC AUTO: 51.7 FL (ref 37–54)
EOSINOPHIL # BLD AUTO: 0.17 10*3/MM3 (ref 0–0.3)
EOSINOPHIL NFR BLD AUTO: 1.4 % (ref 0–3)
ERYTHROCYTE [DISTWIDTH] IN BLOOD BY AUTOMATED COUNT: 17.5 % (ref 11.3–14.5)
GFR SERPL CREATININE-BSD FRML MDRD: 121 ML/MIN/1.73
GLUCOSE BLD-MCNC: 83 MG/DL (ref 70–100)
HCT VFR BLD AUTO: 27.4 % (ref 34.5–44)
HGB BLD-MCNC: 8.7 G/DL (ref 11.5–15.5)
IMM GRANULOCYTES # BLD: 0.25 10*3/MM3 (ref 0–0.03)
IMM GRANULOCYTES NFR BLD: 2.1 % (ref 0–0.6)
LYMPHOCYTES # BLD AUTO: 3.68 10*3/MM3 (ref 0.6–4.8)
LYMPHOCYTES NFR BLD AUTO: 31.3 % (ref 24–44)
MCH RBC QN AUTO: 26.9 PG (ref 27–31)
MCHC RBC AUTO-ENTMCNC: 31.8 G/DL (ref 32–36)
MCV RBC AUTO: 84.6 FL (ref 80–99)
MONOCYTES # BLD AUTO: 0.99 10*3/MM3 (ref 0–1)
MONOCYTES NFR BLD AUTO: 8.4 % (ref 0–12)
NEUTROPHILS # BLD AUTO: 6.61 10*3/MM3 (ref 1.5–8.3)
NEUTROPHILS NFR BLD AUTO: 56.3 % (ref 41–71)
PLAT MORPH BLD: NORMAL
PLATELET # BLD AUTO: 498 10*3/MM3 (ref 150–450)
PMV BLD AUTO: 8.2 FL (ref 6–12)
POTASSIUM BLD-SCNC: 4.1 MMOL/L (ref 3.5–5.5)
RBC # BLD AUTO: 3.24 10*6/MM3 (ref 3.89–5.14)
RBC MORPH BLD: NORMAL
SODIUM BLD-SCNC: 138 MMOL/L (ref 132–146)
WBC MORPH BLD: NORMAL
WBC NRBC COR # BLD: 11.76 10*3/MM3 (ref 3.5–10.8)

## 2018-05-05 PROCEDURE — 25010000002 ENOXAPARIN PER 10 MG: Performed by: NURSE PRACTITIONER

## 2018-05-05 PROCEDURE — 85007 BL SMEAR W/DIFF WBC COUNT: CPT | Performed by: NURSE PRACTITIONER

## 2018-05-05 PROCEDURE — 99232 SBSQ HOSP IP/OBS MODERATE 35: CPT | Performed by: HOSPITALIST

## 2018-05-05 PROCEDURE — 99291 CRITICAL CARE FIRST HOUR: CPT | Performed by: NURSE PRACTITIONER

## 2018-05-05 PROCEDURE — 25010000002 LORAZEPAM PER 2 MG: Performed by: NURSE PRACTITIONER

## 2018-05-05 PROCEDURE — 25010000002 ZIPRASIDONE MESYLATE PER 10 MG: Performed by: NURSE PRACTITIONER

## 2018-05-05 PROCEDURE — 80048 BASIC METABOLIC PNL TOTAL CA: CPT | Performed by: NURSE PRACTITIONER

## 2018-05-05 PROCEDURE — 85025 COMPLETE CBC W/AUTO DIFF WBC: CPT | Performed by: NURSE PRACTITIONER

## 2018-05-05 RX ORDER — LORAZEPAM 2 MG/ML
1 INJECTION INTRAMUSCULAR ONCE
Status: COMPLETED | OUTPATIENT
Start: 2018-05-05 | End: 2018-05-05

## 2018-05-05 RX ORDER — ZIPRASIDONE MESYLATE 20 MG/ML
10 INJECTION, POWDER, LYOPHILIZED, FOR SOLUTION INTRAMUSCULAR ONCE
Status: COMPLETED | OUTPATIENT
Start: 2018-05-05 | End: 2018-05-05

## 2018-05-05 RX ORDER — TRAMADOL HYDROCHLORIDE 50 MG/1
50 TABLET ORAL EVERY 6 HOURS PRN
Status: DISPENSED | OUTPATIENT
Start: 2018-05-05 | End: 2018-05-15

## 2018-05-05 RX ADMIN — HALOPERIDOL 5 MG: 5 TABLET ORAL at 17:28

## 2018-05-05 RX ADMIN — ZIPRASIDONE MESYLATE 10 MG: 20 INJECTION, POWDER, LYOPHILIZED, FOR SOLUTION INTRAMUSCULAR at 23:11

## 2018-05-05 RX ADMIN — CHLORPROMAZINE HYDROCHLORIDE 25 MG: 25 TABLET, SUGAR COATED ORAL at 13:10

## 2018-05-05 RX ADMIN — METRONIDAZOLE 500 MG: 500 TABLET ORAL at 13:10

## 2018-05-05 RX ADMIN — PROPRANOLOL HYDROCHLORIDE 20 MG: 20 TABLET ORAL at 13:10

## 2018-05-05 RX ADMIN — FOLIC ACID 1 MG: 1 TABLET ORAL at 09:49

## 2018-05-05 RX ADMIN — LORAZEPAM 1 MG: 2 INJECTION INTRAMUSCULAR; INTRAVENOUS at 23:00

## 2018-05-05 RX ADMIN — TRAZODONE HYDROCHLORIDE 50 MG: 50 TABLET ORAL at 20:02

## 2018-05-05 RX ADMIN — CHLORPROMAZINE HYDROCHLORIDE 25 MG: 25 TABLET, SUGAR COATED ORAL at 06:02

## 2018-05-05 RX ADMIN — LORAZEPAM 0.5 MG: 0.5 TABLET ORAL at 17:03

## 2018-05-05 RX ADMIN — ESCITALOPRAM OXALATE 10 MG: 10 TABLET ORAL at 09:49

## 2018-05-05 RX ADMIN — LORAZEPAM 1 MG: 2 INJECTION INTRAMUSCULAR; INTRAVENOUS at 17:46

## 2018-05-05 RX ADMIN — PROPRANOLOL HYDROCHLORIDE 20 MG: 20 TABLET ORAL at 06:02

## 2018-05-05 RX ADMIN — CALCIUM POLYCARBOPHIL 1250 MG: 625 TABLET, FILM COATED ORAL at 09:29

## 2018-05-05 RX ADMIN — TRAMADOL HYDROCHLORIDE 50 MG: 50 TABLET, COATED ORAL at 13:25

## 2018-05-05 RX ADMIN — CALCIUM POLYCARBOPHIL 1250 MG: 625 TABLET, FILM COATED ORAL at 20:02

## 2018-05-05 RX ADMIN — POLYETHYLENE GLYCOL (3350) 17 G: 17 POWDER, FOR SOLUTION ORAL at 09:30

## 2018-05-05 RX ADMIN — ENOXAPARIN SODIUM 40 MG: 40 INJECTION SUBCUTANEOUS at 09:30

## 2018-05-05 RX ADMIN — CLARITHROMYCIN 500 MG: 250 TABLET, FILM COATED ORAL at 09:30

## 2018-05-05 RX ADMIN — CHLORPROMAZINE HYDROCHLORIDE 25 MG: 25 TABLET, SUGAR COATED ORAL at 21:12

## 2018-05-05 RX ADMIN — PANTOPRAZOLE SODIUM 40 MG: 40 TABLET, DELAYED RELEASE ORAL at 06:03

## 2018-05-05 RX ADMIN — METRONIDAZOLE 500 MG: 500 TABLET ORAL at 06:02

## 2018-05-05 RX ADMIN — BENZTROPINE MESYLATE 2 MG: 1 TABLET ORAL at 09:30

## 2018-05-05 RX ADMIN — DIVALPROEX SODIUM 1000 MG: 500 TABLET, FILM COATED, EXTENDED RELEASE ORAL at 20:02

## 2018-05-05 RX ADMIN — CLARITHROMYCIN 500 MG: 250 TABLET, FILM COATED ORAL at 20:02

## 2018-05-05 RX ADMIN — DIVALPROEX SODIUM 500 MG: 500 TABLET, FILM COATED, EXTENDED RELEASE ORAL at 09:30

## 2018-05-05 RX ADMIN — LORAZEPAM 1 MG: 2 INJECTION INTRAMUSCULAR; INTRAVENOUS at 22:27

## 2018-05-05 RX ADMIN — METRONIDAZOLE 500 MG: 500 TABLET ORAL at 21:12

## 2018-05-05 RX ADMIN — PROPRANOLOL HYDROCHLORIDE 20 MG: 20 TABLET ORAL at 21:12

## 2018-05-05 RX ADMIN — BENZTROPINE MESYLATE 2 MG: 1 TABLET ORAL at 20:02

## 2018-05-05 RX ADMIN — ACETAMINOPHEN 650 MG: 325 TABLET ORAL at 04:46

## 2018-05-05 RX ADMIN — ENOXAPARIN SODIUM 40 MG: 40 INJECTION SUBCUTANEOUS at 20:02

## 2018-05-06 PROCEDURE — 94799 UNLISTED PULMONARY SVC/PX: CPT

## 2018-05-06 PROCEDURE — 97116 GAIT TRAINING THERAPY: CPT

## 2018-05-06 PROCEDURE — 25010000002 LORAZEPAM PER 2 MG: Performed by: HOSPITALIST

## 2018-05-06 PROCEDURE — 97110 THERAPEUTIC EXERCISES: CPT

## 2018-05-06 PROCEDURE — 99232 SBSQ HOSP IP/OBS MODERATE 35: CPT | Performed by: HOSPITALIST

## 2018-05-06 PROCEDURE — 25010000002 NA FERRIC GLUC CPLX PER 12.5 MG: Performed by: NURSE PRACTITIONER

## 2018-05-06 PROCEDURE — 25010000002 ENOXAPARIN PER 10 MG: Performed by: NURSE PRACTITIONER

## 2018-05-06 RX ORDER — LORAZEPAM 2 MG/ML
1 INJECTION INTRAMUSCULAR ONCE
Status: COMPLETED | OUTPATIENT
Start: 2018-05-06 | End: 2018-05-06

## 2018-05-06 RX ORDER — OLANZAPINE 5 MG/1
10 TABLET ORAL NIGHTLY
Status: DISCONTINUED | OUTPATIENT
Start: 2018-05-06 | End: 2018-05-07

## 2018-05-06 RX ADMIN — METRONIDAZOLE 500 MG: 500 TABLET ORAL at 13:00

## 2018-05-06 RX ADMIN — BENZTROPINE MESYLATE 2 MG: 1 TABLET ORAL at 08:13

## 2018-05-06 RX ADMIN — PANTOPRAZOLE SODIUM 40 MG: 40 TABLET, DELAYED RELEASE ORAL at 07:57

## 2018-05-06 RX ADMIN — POLYETHYLENE GLYCOL (3350) 17 G: 17 POWDER, FOR SOLUTION ORAL at 08:13

## 2018-05-06 RX ADMIN — OLANZAPINE 10 MG: 5 TABLET, FILM COATED ORAL at 22:05

## 2018-05-06 RX ADMIN — ENOXAPARIN SODIUM 40 MG: 40 INJECTION SUBCUTANEOUS at 08:13

## 2018-05-06 RX ADMIN — PROPRANOLOL HYDROCHLORIDE 20 MG: 20 TABLET ORAL at 07:57

## 2018-05-06 RX ADMIN — CALCIUM POLYCARBOPHIL 1250 MG: 625 TABLET, FILM COATED ORAL at 22:05

## 2018-05-06 RX ADMIN — PROPRANOLOL HYDROCHLORIDE 20 MG: 20 TABLET ORAL at 22:06

## 2018-05-06 RX ADMIN — CHLORPROMAZINE HYDROCHLORIDE 25 MG: 25 TABLET, SUGAR COATED ORAL at 13:00

## 2018-05-06 RX ADMIN — CHLORPROMAZINE HYDROCHLORIDE 25 MG: 25 TABLET, SUGAR COATED ORAL at 22:05

## 2018-05-06 RX ADMIN — DIVALPROEX SODIUM 1000 MG: 500 TABLET, FILM COATED, EXTENDED RELEASE ORAL at 22:05

## 2018-05-06 RX ADMIN — LORAZEPAM 0.5 MG: 0.5 TABLET ORAL at 09:13

## 2018-05-06 RX ADMIN — CALCIUM POLYCARBOPHIL 1250 MG: 625 TABLET, FILM COATED ORAL at 08:14

## 2018-05-06 RX ADMIN — CHLORPROMAZINE HYDROCHLORIDE 25 MG: 25 TABLET, SUGAR COATED ORAL at 07:57

## 2018-05-06 RX ADMIN — ENOXAPARIN SODIUM 40 MG: 40 INJECTION SUBCUTANEOUS at 22:06

## 2018-05-06 RX ADMIN — HALOPERIDOL 5 MG: 5 TABLET ORAL at 15:47

## 2018-05-06 RX ADMIN — BENZTROPINE MESYLATE 2 MG: 1 TABLET ORAL at 22:06

## 2018-05-06 RX ADMIN — METRONIDAZOLE 500 MG: 500 TABLET ORAL at 22:06

## 2018-05-06 RX ADMIN — LORAZEPAM 0.5 MG: 0.5 TABLET ORAL at 16:24

## 2018-05-06 RX ADMIN — CLARITHROMYCIN 500 MG: 250 TABLET, FILM COATED ORAL at 08:14

## 2018-05-06 RX ADMIN — FOLIC ACID 1 MG: 1 TABLET ORAL at 08:13

## 2018-05-06 RX ADMIN — TRAZODONE HYDROCHLORIDE 50 MG: 50 TABLET ORAL at 22:05

## 2018-05-06 RX ADMIN — ESCITALOPRAM OXALATE 10 MG: 10 TABLET ORAL at 08:13

## 2018-05-06 RX ADMIN — CLARITHROMYCIN 500 MG: 250 TABLET, FILM COATED ORAL at 22:05

## 2018-05-06 RX ADMIN — SODIUM CHLORIDE 125 MG: 9 INJECTION, SOLUTION INTRAVENOUS at 12:29

## 2018-05-06 RX ADMIN — PROPRANOLOL HYDROCHLORIDE 20 MG: 20 TABLET ORAL at 13:00

## 2018-05-06 RX ADMIN — METRONIDAZOLE 500 MG: 500 TABLET ORAL at 07:57

## 2018-05-06 RX ADMIN — LORAZEPAM 1 MG: 2 INJECTION INTRAMUSCULAR; INTRAVENOUS at 16:37

## 2018-05-07 LAB
BACTERIA SPEC AEROBE CULT: NORMAL
BACTERIA SPEC AEROBE CULT: NORMAL
MAGNESIUM SERPL-MCNC: 1.8 MG/DL (ref 1.3–2.7)
STFR SERPL-SCNC: 28.3 NMOL/L (ref 12.2–27.3)
VIT B12 USB SERPL-MCNC: 1023 PG/ML (ref 725–2045)

## 2018-05-07 PROCEDURE — 83735 ASSAY OF MAGNESIUM: CPT | Performed by: PHYSICIAN ASSISTANT

## 2018-05-07 PROCEDURE — 97530 THERAPEUTIC ACTIVITIES: CPT

## 2018-05-07 PROCEDURE — 25010000002 ZIPRASIDONE MESYLATE PER 10 MG: Performed by: HOSPITALIST

## 2018-05-07 PROCEDURE — 99232 SBSQ HOSP IP/OBS MODERATE 35: CPT | Performed by: HOSPITALIST

## 2018-05-07 PROCEDURE — 25010000002 ENOXAPARIN PER 10 MG: Performed by: NURSE PRACTITIONER

## 2018-05-07 PROCEDURE — 25010000002 ZIPRASIDONE MESYLATE PER 10 MG: Performed by: PHYSICIAN ASSISTANT

## 2018-05-07 RX ORDER — OLANZAPINE 5 MG/1
20 TABLET ORAL NIGHTLY
Status: DISCONTINUED | OUTPATIENT
Start: 2018-05-07 | End: 2018-05-07

## 2018-05-07 RX ORDER — ZIPRASIDONE MESYLATE 20 MG/ML
10 INJECTION, POWDER, LYOPHILIZED, FOR SOLUTION INTRAMUSCULAR ONCE
Status: COMPLETED | OUTPATIENT
Start: 2018-05-07 | End: 2018-05-07

## 2018-05-07 RX ORDER — ZIPRASIDONE MESYLATE 20 MG/ML
20 INJECTION, POWDER, LYOPHILIZED, FOR SOLUTION INTRAMUSCULAR ONCE
Status: DISCONTINUED | OUTPATIENT
Start: 2018-05-07 | End: 2018-05-07

## 2018-05-07 RX ORDER — OLANZAPINE 5 MG/1
20 TABLET ORAL NIGHTLY
Status: DISCONTINUED | OUTPATIENT
Start: 2018-05-07 | End: 2018-05-16 | Stop reason: HOSPADM

## 2018-05-07 RX ADMIN — ESCITALOPRAM OXALATE 10 MG: 10 TABLET ORAL at 09:52

## 2018-05-07 RX ADMIN — DIVALPROEX SODIUM 500 MG: 500 TABLET, FILM COATED, EXTENDED RELEASE ORAL at 09:51

## 2018-05-07 RX ADMIN — LORAZEPAM 0.5 MG: 0.5 TABLET ORAL at 00:19

## 2018-05-07 RX ADMIN — METRONIDAZOLE 500 MG: 500 TABLET ORAL at 14:28

## 2018-05-07 RX ADMIN — ZIPRASIDONE MESYLATE 10 MG: 20 INJECTION, POWDER, LYOPHILIZED, FOR SOLUTION INTRAMUSCULAR at 21:45

## 2018-05-07 RX ADMIN — FOLIC ACID 1 MG: 1 TABLET ORAL at 09:52

## 2018-05-07 RX ADMIN — ZIPRASIDONE MESYLATE 10 MG: 20 INJECTION, POWDER, LYOPHILIZED, FOR SOLUTION INTRAMUSCULAR at 11:55

## 2018-05-07 RX ADMIN — BENZTROPINE MESYLATE 2 MG: 1 TABLET ORAL at 09:52

## 2018-05-07 RX ADMIN — OLANZAPINE 20 MG: 5 TABLET, FILM COATED ORAL at 18:52

## 2018-05-07 RX ADMIN — CLARITHROMYCIN 500 MG: 250 TABLET, FILM COATED ORAL at 09:52

## 2018-05-07 RX ADMIN — PROPRANOLOL HYDROCHLORIDE 20 MG: 20 TABLET ORAL at 14:28

## 2018-05-07 RX ADMIN — CHLORPROMAZINE HYDROCHLORIDE 25 MG: 25 TABLET, SUGAR COATED ORAL at 14:30

## 2018-05-07 RX ADMIN — POLYETHYLENE GLYCOL (3350) 17 G: 17 POWDER, FOR SOLUTION ORAL at 09:52

## 2018-05-07 RX ADMIN — PROPRANOLOL HYDROCHLORIDE 20 MG: 20 TABLET ORAL at 05:08

## 2018-05-07 RX ADMIN — ENOXAPARIN SODIUM 40 MG: 40 INJECTION SUBCUTANEOUS at 09:51

## 2018-05-07 RX ADMIN — ZIPRASIDONE MESYLATE 10 MG: 20 INJECTION, POWDER, LYOPHILIZED, FOR SOLUTION INTRAMUSCULAR at 01:51

## 2018-05-07 RX ADMIN — CHLORPROMAZINE HYDROCHLORIDE 25 MG: 25 TABLET, SUGAR COATED ORAL at 05:07

## 2018-05-07 RX ADMIN — CALCIUM POLYCARBOPHIL 1250 MG: 625 TABLET, FILM COATED ORAL at 09:51

## 2018-05-07 RX ADMIN — METRONIDAZOLE 500 MG: 500 TABLET ORAL at 05:08

## 2018-05-07 RX ADMIN — ACETAMINOPHEN 650 MG: 325 TABLET ORAL at 15:25

## 2018-05-07 RX ADMIN — PANTOPRAZOLE SODIUM 40 MG: 40 TABLET, DELAYED RELEASE ORAL at 05:08

## 2018-05-08 LAB
ANION GAP SERPL CALCULATED.3IONS-SCNC: 10 MMOL/L (ref 3–11)
BASOPHILS # BLD AUTO: 0.04 10*3/MM3 (ref 0–0.2)
BASOPHILS NFR BLD AUTO: 0.6 % (ref 0–1)
BUN BLD-MCNC: 14 MG/DL (ref 9–23)
BUN/CREAT SERPL: 20 (ref 7–25)
CALCIUM SPEC-SCNC: 9.2 MG/DL (ref 8.7–10.4)
CHLORIDE SERPL-SCNC: 101 MMOL/L (ref 99–109)
CK SERPL-CCNC: 94 U/L (ref 26–174)
CO2 SERPL-SCNC: 27 MMOL/L (ref 20–31)
CREAT BLD-MCNC: 0.7 MG/DL (ref 0.6–1.3)
DEPRECATED RDW RBC AUTO: 54.9 FL (ref 37–54)
EOSINOPHIL # BLD AUTO: 0.11 10*3/MM3 (ref 0–0.3)
EOSINOPHIL NFR BLD AUTO: 1.6 % (ref 0–3)
ERYTHROCYTE [DISTWIDTH] IN BLOOD BY AUTOMATED COUNT: 17.5 % (ref 11.3–14.5)
GFR SERPL CREATININE-BSD FRML MDRD: 101 ML/MIN/1.73
GLUCOSE BLD-MCNC: 92 MG/DL (ref 70–100)
HCT VFR BLD AUTO: 32.2 % (ref 34.5–44)
HGB BLD-MCNC: 10.1 G/DL (ref 11.5–15.5)
IMM GRANULOCYTES # BLD: 0.07 10*3/MM3 (ref 0–0.03)
IMM GRANULOCYTES NFR BLD: 1 % (ref 0–0.6)
LYMPHOCYTES # BLD AUTO: 3.75 10*3/MM3 (ref 0.6–4.8)
LYMPHOCYTES NFR BLD AUTO: 53.6 % (ref 24–44)
MCH RBC QN AUTO: 27 PG (ref 27–31)
MCHC RBC AUTO-ENTMCNC: 31.4 G/DL (ref 32–36)
MCV RBC AUTO: 86.1 FL (ref 80–99)
MONOCYTES # BLD AUTO: 0.68 10*3/MM3 (ref 0–1)
MONOCYTES NFR BLD AUTO: 9.7 % (ref 0–12)
NEUTROPHILS # BLD AUTO: 2.42 10*3/MM3 (ref 1.5–8.3)
NEUTROPHILS NFR BLD AUTO: 34.5 % (ref 41–71)
PLATELET # BLD AUTO: 532 10*3/MM3 (ref 150–450)
PMV BLD AUTO: 8.7 FL (ref 6–12)
POTASSIUM BLD-SCNC: 4.2 MMOL/L (ref 3.5–5.5)
RBC # BLD AUTO: 3.74 10*6/MM3 (ref 3.89–5.14)
SODIUM BLD-SCNC: 138 MMOL/L (ref 132–146)
WBC NRBC COR # BLD: 7 10*3/MM3 (ref 3.5–10.8)

## 2018-05-08 PROCEDURE — 97530 THERAPEUTIC ACTIVITIES: CPT

## 2018-05-08 PROCEDURE — 80048 BASIC METABOLIC PNL TOTAL CA: CPT | Performed by: HOSPITALIST

## 2018-05-08 PROCEDURE — 97116 GAIT TRAINING THERAPY: CPT

## 2018-05-08 PROCEDURE — 85025 COMPLETE CBC W/AUTO DIFF WBC: CPT | Performed by: HOSPITALIST

## 2018-05-08 PROCEDURE — 93010 ELECTROCARDIOGRAM REPORT: CPT | Performed by: INTERNAL MEDICINE

## 2018-05-08 PROCEDURE — 99233 SBSQ HOSP IP/OBS HIGH 50: CPT | Performed by: HOSPITALIST

## 2018-05-08 PROCEDURE — 93005 ELECTROCARDIOGRAM TRACING: CPT | Performed by: PHYSICIAN ASSISTANT

## 2018-05-08 PROCEDURE — 82550 ASSAY OF CK (CPK): CPT | Performed by: HOSPITALIST

## 2018-05-08 RX ORDER — SODIUM PHOSPHATE, DIBASIC AND SODIUM PHOSPHATE, MONOBASIC 7; 19 G/133ML; G/133ML
1 ENEMA RECTAL ONCE
Status: COMPLETED | OUTPATIENT
Start: 2018-05-08 | End: 2018-05-10

## 2018-05-08 RX ORDER — LORAZEPAM 0.5 MG/1
0.5 TABLET ORAL EVERY 12 HOURS SCHEDULED
Status: DISCONTINUED | OUTPATIENT
Start: 2018-05-08 | End: 2018-05-12

## 2018-05-08 RX ADMIN — METRONIDAZOLE 500 MG: 500 TABLET ORAL at 15:14

## 2018-05-08 RX ADMIN — PROPRANOLOL HYDROCHLORIDE 20 MG: 20 TABLET ORAL at 15:14

## 2018-05-08 RX ADMIN — CHLORPROMAZINE HYDROCHLORIDE 25 MG: 25 TABLET, SUGAR COATED ORAL at 15:14

## 2018-05-08 RX ADMIN — PANTOPRAZOLE SODIUM 40 MG: 40 TABLET, DELAYED RELEASE ORAL at 05:30

## 2018-05-08 RX ADMIN — CLARITHROMYCIN 500 MG: 250 TABLET, FILM COATED ORAL at 08:09

## 2018-05-08 RX ADMIN — SODIUM CHLORIDE 75 ML/HR: 9 INJECTION, SOLUTION INTRAVENOUS at 09:03

## 2018-05-08 RX ADMIN — BENZTROPINE MESYLATE 2 MG: 1 TABLET ORAL at 08:09

## 2018-05-08 RX ADMIN — METRONIDAZOLE 500 MG: 500 TABLET ORAL at 05:29

## 2018-05-08 RX ADMIN — OLANZAPINE 20 MG: 5 TABLET, FILM COATED ORAL at 18:12

## 2018-05-08 RX ADMIN — CALCIUM POLYCARBOPHIL 1250 MG: 625 TABLET, FILM COATED ORAL at 08:09

## 2018-05-08 RX ADMIN — CHLORPROMAZINE HYDROCHLORIDE 25 MG: 25 TABLET, SUGAR COATED ORAL at 05:29

## 2018-05-08 RX ADMIN — ESCITALOPRAM OXALATE 10 MG: 10 TABLET ORAL at 08:09

## 2018-05-08 RX ADMIN — LORAZEPAM 0.5 MG: 0.5 TABLET ORAL at 18:13

## 2018-05-08 RX ADMIN — PROPRANOLOL HYDROCHLORIDE 20 MG: 20 TABLET ORAL at 05:29

## 2018-05-08 RX ADMIN — FOLIC ACID 1 MG: 1 TABLET ORAL at 08:08

## 2018-05-08 RX ADMIN — DIVALPROEX SODIUM 500 MG: 500 TABLET, FILM COATED, EXTENDED RELEASE ORAL at 08:09

## 2018-05-09 PROCEDURE — 99233 SBSQ HOSP IP/OBS HIGH 50: CPT | Performed by: HOSPITALIST

## 2018-05-09 RX ADMIN — POLYETHYLENE GLYCOL (3350) 17 G: 17 POWDER, FOR SOLUTION ORAL at 12:52

## 2018-05-09 RX ADMIN — LORAZEPAM 0.5 MG: 0.5 TABLET ORAL at 12:46

## 2018-05-09 RX ADMIN — METRONIDAZOLE 500 MG: 500 TABLET ORAL at 20:26

## 2018-05-09 RX ADMIN — BENZTROPINE MESYLATE 2 MG: 1 TABLET ORAL at 12:46

## 2018-05-09 RX ADMIN — CLARITHROMYCIN 500 MG: 250 TABLET, FILM COATED ORAL at 20:27

## 2018-05-09 RX ADMIN — ESCITALOPRAM OXALATE 10 MG: 10 TABLET ORAL at 12:46

## 2018-05-09 RX ADMIN — CHLORPROMAZINE HYDROCHLORIDE 25 MG: 25 TABLET, SUGAR COATED ORAL at 12:46

## 2018-05-09 RX ADMIN — LORAZEPAM 0.5 MG: 0.5 TABLET ORAL at 20:28

## 2018-05-09 RX ADMIN — CALCIUM POLYCARBOPHIL 1250 MG: 625 TABLET, FILM COATED ORAL at 20:29

## 2018-05-09 RX ADMIN — CLARITHROMYCIN 500 MG: 250 TABLET, FILM COATED ORAL at 12:47

## 2018-05-09 RX ADMIN — TRAZODONE HYDROCHLORIDE 50 MG: 50 TABLET ORAL at 20:28

## 2018-05-09 RX ADMIN — DIVALPROEX SODIUM 1000 MG: 500 TABLET, FILM COATED, EXTENDED RELEASE ORAL at 20:28

## 2018-05-09 RX ADMIN — OLANZAPINE 20 MG: 5 TABLET, FILM COATED ORAL at 20:28

## 2018-05-09 RX ADMIN — DIVALPROEX SODIUM 500 MG: 500 TABLET, FILM COATED, EXTENDED RELEASE ORAL at 12:52

## 2018-05-09 RX ADMIN — PROPRANOLOL HYDROCHLORIDE 20 MG: 20 TABLET ORAL at 20:27

## 2018-05-09 RX ADMIN — CHLORPROMAZINE HYDROCHLORIDE 25 MG: 25 TABLET, SUGAR COATED ORAL at 20:28

## 2018-05-09 RX ADMIN — TRAMADOL HYDROCHLORIDE 50 MG: 50 TABLET, COATED ORAL at 01:02

## 2018-05-09 RX ADMIN — FOLIC ACID 1 MG: 1 TABLET ORAL at 12:46

## 2018-05-09 RX ADMIN — BENZTROPINE MESYLATE 2 MG: 1 TABLET ORAL at 20:28

## 2018-05-09 RX ADMIN — PROPRANOLOL HYDROCHLORIDE 20 MG: 20 TABLET ORAL at 12:46

## 2018-05-09 RX ADMIN — METRONIDAZOLE 500 MG: 500 TABLET ORAL at 12:46

## 2018-05-10 PROCEDURE — 25010000002 ZIPRASIDONE MESYLATE PER 10 MG: Performed by: NURSE PRACTITIONER

## 2018-05-10 PROCEDURE — 97116 GAIT TRAINING THERAPY: CPT

## 2018-05-10 PROCEDURE — 97530 THERAPEUTIC ACTIVITIES: CPT

## 2018-05-10 PROCEDURE — 99232 SBSQ HOSP IP/OBS MODERATE 35: CPT | Performed by: HOSPITALIST

## 2018-05-10 PROCEDURE — 97110 THERAPEUTIC EXERCISES: CPT

## 2018-05-10 RX ORDER — LORAZEPAM 0.5 MG/1
0.5 TABLET ORAL ONCE
Status: COMPLETED | OUTPATIENT
Start: 2018-05-10 | End: 2018-05-10

## 2018-05-10 RX ORDER — ZIPRASIDONE MESYLATE 20 MG/ML
10 INJECTION, POWDER, LYOPHILIZED, FOR SOLUTION INTRAMUSCULAR ONCE
Status: COMPLETED | OUTPATIENT
Start: 2018-05-10 | End: 2018-05-10

## 2018-05-10 RX ADMIN — CHLORPROMAZINE HYDROCHLORIDE 25 MG: 25 TABLET, SUGAR COATED ORAL at 08:03

## 2018-05-10 RX ADMIN — OLANZAPINE 20 MG: 5 TABLET, FILM COATED ORAL at 22:15

## 2018-05-10 RX ADMIN — LORAZEPAM 0.5 MG: 0.5 TABLET ORAL at 22:15

## 2018-05-10 RX ADMIN — LORAZEPAM 0.5 MG: 0.5 TABLET ORAL at 20:56

## 2018-05-10 RX ADMIN — DIVALPROEX SODIUM 1000 MG: 500 TABLET, FILM COATED, EXTENDED RELEASE ORAL at 20:56

## 2018-05-10 RX ADMIN — BENZTROPINE MESYLATE 2 MG: 1 TABLET ORAL at 20:57

## 2018-05-10 RX ADMIN — CLARITHROMYCIN 500 MG: 250 TABLET, FILM COATED ORAL at 20:57

## 2018-05-10 RX ADMIN — METRONIDAZOLE 500 MG: 500 TABLET ORAL at 12:59

## 2018-05-10 RX ADMIN — METRONIDAZOLE 500 MG: 500 TABLET ORAL at 08:03

## 2018-05-10 RX ADMIN — CALCIUM POLYCARBOPHIL 1250 MG: 625 TABLET, FILM COATED ORAL at 08:03

## 2018-05-10 RX ADMIN — TRAZODONE HYDROCHLORIDE 50 MG: 50 TABLET ORAL at 20:56

## 2018-05-10 RX ADMIN — SODIUM CHLORIDE 100 ML/HR: 9 INJECTION, SOLUTION INTRAVENOUS at 22:39

## 2018-05-10 RX ADMIN — CHLORPROMAZINE HYDROCHLORIDE 25 MG: 25 TABLET, SUGAR COATED ORAL at 20:56

## 2018-05-10 RX ADMIN — PROPRANOLOL HYDROCHLORIDE 20 MG: 20 TABLET ORAL at 08:04

## 2018-05-10 RX ADMIN — PROPRANOLOL HYDROCHLORIDE 20 MG: 20 TABLET ORAL at 12:59

## 2018-05-10 RX ADMIN — ZIPRASIDONE MESYLATE 10 MG: 20 INJECTION, POWDER, LYOPHILIZED, FOR SOLUTION INTRAMUSCULAR at 00:34

## 2018-05-10 RX ADMIN — TRAMADOL HYDROCHLORIDE 50 MG: 50 TABLET, COATED ORAL at 13:41

## 2018-05-10 RX ADMIN — ESCITALOPRAM OXALATE 10 MG: 10 TABLET ORAL at 08:03

## 2018-05-10 RX ADMIN — CHLORPROMAZINE HYDROCHLORIDE 25 MG: 25 TABLET, SUGAR COATED ORAL at 13:00

## 2018-05-10 RX ADMIN — SODIUM PHOSPHATE 1 ENEMA: 7; 19 ENEMA RECTAL at 09:48

## 2018-05-10 RX ADMIN — PANTOPRAZOLE SODIUM 40 MG: 40 TABLET, DELAYED RELEASE ORAL at 08:03

## 2018-05-10 RX ADMIN — PROPRANOLOL HYDROCHLORIDE 20 MG: 20 TABLET ORAL at 20:56

## 2018-05-10 RX ADMIN — METRONIDAZOLE 500 MG: 500 TABLET ORAL at 20:56

## 2018-05-10 RX ADMIN — DIVALPROEX SODIUM 500 MG: 500 TABLET, FILM COATED, EXTENDED RELEASE ORAL at 08:04

## 2018-05-10 RX ADMIN — Medication 1 TABLET: at 22:18

## 2018-05-10 RX ADMIN — LORAZEPAM 0.5 MG: 0.5 TABLET ORAL at 20:58

## 2018-05-10 RX ADMIN — CLARITHROMYCIN 500 MG: 250 TABLET, FILM COATED ORAL at 08:04

## 2018-05-10 RX ADMIN — BENZTROPINE MESYLATE 2 MG: 1 TABLET ORAL at 08:03

## 2018-05-10 RX ADMIN — LORAZEPAM 0.5 MG: 0.5 TABLET ORAL at 08:04

## 2018-05-10 RX ADMIN — CALCIUM POLYCARBOPHIL 1250 MG: 625 TABLET, FILM COATED ORAL at 20:56

## 2018-05-10 RX ADMIN — FOLIC ACID 1 MG: 1 TABLET ORAL at 08:04

## 2018-05-11 PROCEDURE — 25010000002 LORAZEPAM PER 2 MG: Performed by: INTERNAL MEDICINE

## 2018-05-11 PROCEDURE — 25010000002 ENOXAPARIN PER 10 MG: Performed by: NURSE PRACTITIONER

## 2018-05-11 PROCEDURE — 99232 SBSQ HOSP IP/OBS MODERATE 35: CPT | Performed by: HOSPITALIST

## 2018-05-11 RX ORDER — LORAZEPAM 2 MG/ML
1 INJECTION INTRAMUSCULAR ONCE
Status: COMPLETED | OUTPATIENT
Start: 2018-05-11 | End: 2018-05-11

## 2018-05-11 RX ADMIN — BENZTROPINE MESYLATE 2 MG: 1 TABLET ORAL at 20:24

## 2018-05-11 RX ADMIN — CHLORPROMAZINE HYDROCHLORIDE 50 MG: 25 TABLET, SUGAR COATED ORAL at 16:15

## 2018-05-11 RX ADMIN — METRONIDAZOLE 500 MG: 500 TABLET ORAL at 09:07

## 2018-05-11 RX ADMIN — CALCIUM POLYCARBOPHIL 1250 MG: 625 TABLET, FILM COATED ORAL at 11:37

## 2018-05-11 RX ADMIN — PROPRANOLOL HYDROCHLORIDE 20 MG: 20 TABLET ORAL at 20:25

## 2018-05-11 RX ADMIN — TRAZODONE HYDROCHLORIDE 50 MG: 50 TABLET ORAL at 20:25

## 2018-05-11 RX ADMIN — LORAZEPAM 1 MG: 2 INJECTION INTRAMUSCULAR; INTRAVENOUS at 00:25

## 2018-05-11 RX ADMIN — CALCIUM POLYCARBOPHIL 1250 MG: 625 TABLET, FILM COATED ORAL at 20:25

## 2018-05-11 RX ADMIN — PROPRANOLOL HYDROCHLORIDE 20 MG: 20 TABLET ORAL at 16:15

## 2018-05-11 RX ADMIN — LORAZEPAM 0.5 MG: 0.5 TABLET ORAL at 20:25

## 2018-05-11 RX ADMIN — LORAZEPAM 0.5 MG: 0.5 TABLET ORAL at 09:08

## 2018-05-11 RX ADMIN — ESCITALOPRAM OXALATE 10 MG: 10 TABLET ORAL at 09:07

## 2018-05-11 RX ADMIN — CHLORPROMAZINE HYDROCHLORIDE 25 MG: 25 TABLET, SUGAR COATED ORAL at 09:36

## 2018-05-11 RX ADMIN — TRAMADOL HYDROCHLORIDE 50 MG: 50 TABLET, COATED ORAL at 20:25

## 2018-05-11 RX ADMIN — CLARITHROMYCIN 500 MG: 250 TABLET, FILM COATED ORAL at 20:25

## 2018-05-11 RX ADMIN — METRONIDAZOLE 500 MG: 500 TABLET ORAL at 20:26

## 2018-05-11 RX ADMIN — ENOXAPARIN SODIUM 40 MG: 40 INJECTION SUBCUTANEOUS at 20:26

## 2018-05-11 RX ADMIN — CLARITHROMYCIN 500 MG: 250 TABLET, FILM COATED ORAL at 09:07

## 2018-05-11 RX ADMIN — OLANZAPINE 20 MG: 5 TABLET, FILM COATED ORAL at 20:24

## 2018-05-11 RX ADMIN — BENZTROPINE MESYLATE 2 MG: 1 TABLET ORAL at 11:36

## 2018-05-11 RX ADMIN — CHLORPROMAZINE HYDROCHLORIDE 25 MG: 25 TABLET, SUGAR COATED ORAL at 20:25

## 2018-05-11 RX ADMIN — DIVALPROEX SODIUM 1000 MG: 500 TABLET, FILM COATED, EXTENDED RELEASE ORAL at 20:26

## 2018-05-11 RX ADMIN — METRONIDAZOLE 500 MG: 500 TABLET ORAL at 16:53

## 2018-05-11 RX ADMIN — PANTOPRAZOLE SODIUM 40 MG: 40 TABLET, DELAYED RELEASE ORAL at 09:08

## 2018-05-11 RX ADMIN — FOLIC ACID 1 MG: 1 TABLET ORAL at 09:07

## 2018-05-11 RX ADMIN — DIVALPROEX SODIUM 500 MG: 500 TABLET, FILM COATED, EXTENDED RELEASE ORAL at 09:06

## 2018-05-12 PROCEDURE — 99232 SBSQ HOSP IP/OBS MODERATE 35: CPT | Performed by: HOSPITALIST

## 2018-05-12 PROCEDURE — 25010000002 HALOPERIDOL LACTATE PER 5 MG: Performed by: NURSE PRACTITIONER

## 2018-05-12 PROCEDURE — 25010000002 ENOXAPARIN PER 10 MG: Performed by: NURSE PRACTITIONER

## 2018-05-12 PROCEDURE — 97110 THERAPEUTIC EXERCISES: CPT

## 2018-05-12 PROCEDURE — 97116 GAIT TRAINING THERAPY: CPT

## 2018-05-12 RX ORDER — HALOPERIDOL 5 MG/ML
2 INJECTION INTRAMUSCULAR ONCE
Status: COMPLETED | OUTPATIENT
Start: 2018-05-12 | End: 2018-05-12

## 2018-05-12 RX ADMIN — METRONIDAZOLE 500 MG: 500 TABLET ORAL at 20:37

## 2018-05-12 RX ADMIN — OLANZAPINE 20 MG: 5 TABLET, FILM COATED ORAL at 20:48

## 2018-05-12 RX ADMIN — PROPRANOLOL HYDROCHLORIDE 20 MG: 20 TABLET ORAL at 14:53

## 2018-05-12 RX ADMIN — LORAZEPAM 0.5 MG: 0.5 TABLET ORAL at 09:23

## 2018-05-12 RX ADMIN — FOLIC ACID 1 MG: 1 TABLET ORAL at 09:23

## 2018-05-12 RX ADMIN — CLARITHROMYCIN 500 MG: 250 TABLET, FILM COATED ORAL at 09:23

## 2018-05-12 RX ADMIN — METRONIDAZOLE 500 MG: 500 TABLET ORAL at 14:53

## 2018-05-12 RX ADMIN — ENOXAPARIN SODIUM 40 MG: 40 INJECTION SUBCUTANEOUS at 20:49

## 2018-05-12 RX ADMIN — CALCIUM POLYCARBOPHIL 1250 MG: 625 TABLET, FILM COATED ORAL at 20:42

## 2018-05-12 RX ADMIN — CHLORPROMAZINE HYDROCHLORIDE 25 MG: 25 TABLET, SUGAR COATED ORAL at 14:53

## 2018-05-12 RX ADMIN — ESCITALOPRAM OXALATE 10 MG: 10 TABLET ORAL at 09:23

## 2018-05-12 RX ADMIN — PROPRANOLOL HYDROCHLORIDE 20 MG: 20 TABLET ORAL at 21:54

## 2018-05-12 RX ADMIN — BENZTROPINE MESYLATE 2 MG: 1 TABLET ORAL at 09:23

## 2018-05-12 RX ADMIN — CHLORPROMAZINE HYDROCHLORIDE 25 MG: 25 TABLET, SUGAR COATED ORAL at 21:54

## 2018-05-12 RX ADMIN — LORAZEPAM 0.5 MG: 0.5 TABLET ORAL at 14:54

## 2018-05-12 RX ADMIN — LORAZEPAM 0.5 MG: 0.5 TABLET ORAL at 01:42

## 2018-05-12 RX ADMIN — POLYETHYLENE GLYCOL (3350) 17 G: 17 POWDER, FOR SOLUTION ORAL at 09:24

## 2018-05-12 RX ADMIN — CLARITHROMYCIN 500 MG: 250 TABLET, FILM COATED ORAL at 20:40

## 2018-05-12 RX ADMIN — DIVALPROEX SODIUM 500 MG: 500 TABLET, FILM COATED, EXTENDED RELEASE ORAL at 09:23

## 2018-05-12 RX ADMIN — BENZTROPINE MESYLATE 2 MG: 1 TABLET ORAL at 20:46

## 2018-05-12 RX ADMIN — Medication 5 MG: at 20:44

## 2018-05-12 RX ADMIN — SODIUM CHLORIDE 125 ML/HR: 9 INJECTION, SOLUTION INTRAVENOUS at 06:33

## 2018-05-12 RX ADMIN — CALCIUM POLYCARBOPHIL 1250 MG: 625 TABLET, FILM COATED ORAL at 09:23

## 2018-05-12 RX ADMIN — HALOPERIDOL LACTATE 2 MG: 5 INJECTION, SOLUTION INTRAMUSCULAR at 02:23

## 2018-05-12 RX ADMIN — DIVALPROEX SODIUM 1000 MG: 500 TABLET, FILM COATED, EXTENDED RELEASE ORAL at 20:44

## 2018-05-12 RX ADMIN — TRAZODONE HYDROCHLORIDE 50 MG: 50 TABLET ORAL at 20:42

## 2018-05-13 LAB
ALBUMIN SERPL-MCNC: 3.4 G/DL (ref 3.2–4.8)
ALBUMIN/GLOB SERPL: 1.5 G/DL (ref 1.5–2.5)
ALP SERPL-CCNC: 74 U/L (ref 25–100)
ALT SERPL W P-5'-P-CCNC: 23 U/L (ref 7–40)
ANION GAP SERPL CALCULATED.3IONS-SCNC: 6 MMOL/L (ref 3–11)
AST SERPL-CCNC: 21 U/L (ref 0–33)
BASOPHILS # BLD AUTO: 0.08 10*3/MM3 (ref 0–0.2)
BASOPHILS NFR BLD AUTO: 1.4 % (ref 0–1)
BILIRUB SERPL-MCNC: 0.2 MG/DL (ref 0.3–1.2)
BUN BLD-MCNC: 11 MG/DL (ref 9–23)
BUN/CREAT SERPL: 22 (ref 7–25)
CALCIUM SPEC-SCNC: 8.8 MG/DL (ref 8.7–10.4)
CHLORIDE SERPL-SCNC: 108 MMOL/L (ref 99–109)
CO2 SERPL-SCNC: 26 MMOL/L (ref 20–31)
CREAT BLD-MCNC: 0.5 MG/DL (ref 0.6–1.3)
DEPRECATED RDW RBC AUTO: 53.5 FL (ref 37–54)
EOSINOPHIL # BLD AUTO: 0.09 10*3/MM3 (ref 0–0.3)
EOSINOPHIL NFR BLD AUTO: 1.6 % (ref 0–3)
ERYTHROCYTE [DISTWIDTH] IN BLOOD BY AUTOMATED COUNT: 16.7 % (ref 11.3–14.5)
GFR SERPL CREATININE-BSD FRML MDRD: 149 ML/MIN/1.73
GLOBULIN UR ELPH-MCNC: 2.3 GM/DL
GLUCOSE BLD-MCNC: 86 MG/DL (ref 70–100)
HCT VFR BLD AUTO: 30.2 % (ref 34.5–44)
HGB BLD-MCNC: 9.5 G/DL (ref 11.5–15.5)
IMM GRANULOCYTES # BLD: 0.05 10*3/MM3 (ref 0–0.03)
IMM GRANULOCYTES NFR BLD: 0.9 % (ref 0–0.6)
INR PPP: 1.05 (ref 0.91–1.09)
LYMPHOCYTES # BLD AUTO: 2.72 10*3/MM3 (ref 0.6–4.8)
LYMPHOCYTES NFR BLD AUTO: 48.8 % (ref 24–44)
MAGNESIUM SERPL-MCNC: 1.8 MG/DL (ref 1.3–2.7)
MCH RBC QN AUTO: 27.5 PG (ref 27–31)
MCHC RBC AUTO-ENTMCNC: 31.5 G/DL (ref 32–36)
MCV RBC AUTO: 87.3 FL (ref 80–99)
MONOCYTES # BLD AUTO: 0.7 10*3/MM3 (ref 0–1)
MONOCYTES NFR BLD AUTO: 12.6 % (ref 0–12)
NEUTROPHILS # BLD AUTO: 1.98 10*3/MM3 (ref 1.5–8.3)
NEUTROPHILS NFR BLD AUTO: 35.6 % (ref 41–71)
PHOSPHATE SERPL-MCNC: 3.6 MG/DL (ref 2.4–5.1)
PLATELET # BLD AUTO: 441 10*3/MM3 (ref 150–450)
PMV BLD AUTO: 8.9 FL (ref 6–12)
POTASSIUM BLD-SCNC: 4.3 MMOL/L (ref 3.5–5.5)
PROT SERPL-MCNC: 5.7 G/DL (ref 5.7–8.2)
PROTHROMBIN TIME: 11 SECONDS (ref 9.6–11.5)
RBC # BLD AUTO: 3.46 10*6/MM3 (ref 3.89–5.14)
SODIUM BLD-SCNC: 140 MMOL/L (ref 132–146)
WBC NRBC COR # BLD: 5.57 10*3/MM3 (ref 3.5–10.8)

## 2018-05-13 PROCEDURE — 25010000002 ZIPRASIDONE MESYLATE PER 10 MG: Performed by: PHYSICIAN ASSISTANT

## 2018-05-13 PROCEDURE — 25010000002 ENOXAPARIN PER 10 MG: Performed by: NURSE PRACTITIONER

## 2018-05-13 PROCEDURE — 97530 THERAPEUTIC ACTIVITIES: CPT

## 2018-05-13 PROCEDURE — 83735 ASSAY OF MAGNESIUM: CPT | Performed by: HOSPITALIST

## 2018-05-13 PROCEDURE — 84100 ASSAY OF PHOSPHORUS: CPT | Performed by: HOSPITALIST

## 2018-05-13 PROCEDURE — 85610 PROTHROMBIN TIME: CPT | Performed by: HOSPITALIST

## 2018-05-13 PROCEDURE — 85025 COMPLETE CBC W/AUTO DIFF WBC: CPT | Performed by: HOSPITALIST

## 2018-05-13 PROCEDURE — 80053 COMPREHEN METABOLIC PANEL: CPT | Performed by: HOSPITALIST

## 2018-05-13 PROCEDURE — 99232 SBSQ HOSP IP/OBS MODERATE 35: CPT | Performed by: HOSPITALIST

## 2018-05-13 RX ORDER — ZIPRASIDONE MESYLATE 20 MG/ML
10 INJECTION, POWDER, LYOPHILIZED, FOR SOLUTION INTRAMUSCULAR ONCE
Status: COMPLETED | OUTPATIENT
Start: 2018-05-13 | End: 2018-05-13

## 2018-05-13 RX ADMIN — BENZTROPINE MESYLATE 2 MG: 1 TABLET ORAL at 21:23

## 2018-05-13 RX ADMIN — DIVALPROEX SODIUM 500 MG: 500 TABLET, FILM COATED, EXTENDED RELEASE ORAL at 09:54

## 2018-05-13 RX ADMIN — ENOXAPARIN SODIUM 40 MG: 40 INJECTION SUBCUTANEOUS at 21:22

## 2018-05-13 RX ADMIN — CHLORPROMAZINE HYDROCHLORIDE 25 MG: 25 TABLET, SUGAR COATED ORAL at 14:40

## 2018-05-13 RX ADMIN — CHLORPROMAZINE HYDROCHLORIDE 25 MG: 25 TABLET, SUGAR COATED ORAL at 21:24

## 2018-05-13 RX ADMIN — ZIPRASIDONE MESYLATE 10 MG: 20 INJECTION, POWDER, LYOPHILIZED, FOR SOLUTION INTRAMUSCULAR at 23:01

## 2018-05-13 RX ADMIN — METRONIDAZOLE 500 MG: 500 TABLET ORAL at 14:40

## 2018-05-13 RX ADMIN — POLYETHYLENE GLYCOL (3350) 17 G: 17 POWDER, FOR SOLUTION ORAL at 09:53

## 2018-05-13 RX ADMIN — TRAMADOL HYDROCHLORIDE 50 MG: 50 TABLET, COATED ORAL at 15:00

## 2018-05-13 RX ADMIN — BENZTROPINE MESYLATE 2 MG: 1 TABLET ORAL at 09:54

## 2018-05-13 RX ADMIN — CALCIUM POLYCARBOPHIL 1250 MG: 625 TABLET, FILM COATED ORAL at 09:54

## 2018-05-13 RX ADMIN — FOLIC ACID 1 MG: 1 TABLET ORAL at 09:54

## 2018-05-13 RX ADMIN — PROPRANOLOL HYDROCHLORIDE 20 MG: 20 TABLET ORAL at 21:23

## 2018-05-13 RX ADMIN — SODIUM CHLORIDE 125 ML/HR: 9 INJECTION, SOLUTION INTRAVENOUS at 09:52

## 2018-05-13 RX ADMIN — CHLORPROMAZINE HYDROCHLORIDE 25 MG: 25 TABLET, SUGAR COATED ORAL at 06:53

## 2018-05-13 RX ADMIN — LORAZEPAM 0.5 MG: 0.5 TABLET ORAL at 03:06

## 2018-05-13 RX ADMIN — OLANZAPINE 20 MG: 5 TABLET, FILM COATED ORAL at 21:23

## 2018-05-13 RX ADMIN — PROPRANOLOL HYDROCHLORIDE 20 MG: 20 TABLET ORAL at 06:53

## 2018-05-13 RX ADMIN — DIVALPROEX SODIUM 1000 MG: 500 TABLET, FILM COATED, EXTENDED RELEASE ORAL at 21:23

## 2018-05-13 RX ADMIN — METRONIDAZOLE 500 MG: 500 TABLET ORAL at 21:24

## 2018-05-13 RX ADMIN — ESCITALOPRAM OXALATE 10 MG: 10 TABLET ORAL at 09:54

## 2018-05-13 RX ADMIN — Medication 5 MG: at 21:23

## 2018-05-13 RX ADMIN — ENOXAPARIN SODIUM 40 MG: 40 INJECTION SUBCUTANEOUS at 09:53

## 2018-05-13 RX ADMIN — PANTOPRAZOLE SODIUM 40 MG: 40 TABLET, DELAYED RELEASE ORAL at 06:53

## 2018-05-13 RX ADMIN — METRONIDAZOLE 500 MG: 500 TABLET ORAL at 06:53

## 2018-05-13 RX ADMIN — PROPRANOLOL HYDROCHLORIDE 20 MG: 20 TABLET ORAL at 14:40

## 2018-05-13 RX ADMIN — SODIUM CHLORIDE 125 ML/HR: 9 INJECTION, SOLUTION INTRAVENOUS at 02:21

## 2018-05-13 RX ADMIN — CALCIUM POLYCARBOPHIL 1250 MG: 625 TABLET, FILM COATED ORAL at 21:23

## 2018-05-13 RX ADMIN — CLARITHROMYCIN 500 MG: 250 TABLET, FILM COATED ORAL at 09:54

## 2018-05-13 RX ADMIN — TRAZODONE HYDROCHLORIDE 50 MG: 50 TABLET ORAL at 21:24

## 2018-05-13 RX ADMIN — CLARITHROMYCIN 500 MG: 250 TABLET, FILM COATED ORAL at 21:24

## 2018-05-14 PROCEDURE — 25010000002 ENOXAPARIN PER 10 MG: Performed by: NURSE PRACTITIONER

## 2018-05-14 PROCEDURE — 99232 SBSQ HOSP IP/OBS MODERATE 35: CPT | Performed by: NURSE PRACTITIONER

## 2018-05-14 RX ORDER — DIAPER,BRIEF,INFANT-TODD,DISP
EACH MISCELLANEOUS EVERY 12 HOURS SCHEDULED
Status: DISCONTINUED | OUTPATIENT
Start: 2018-05-14 | End: 2018-05-16 | Stop reason: HOSPADM

## 2018-05-14 RX ADMIN — METRONIDAZOLE 500 MG: 500 TABLET ORAL at 21:43

## 2018-05-14 RX ADMIN — PANTOPRAZOLE SODIUM 40 MG: 40 TABLET, DELAYED RELEASE ORAL at 08:40

## 2018-05-14 RX ADMIN — SODIUM CHLORIDE 125 ML/HR: 9 INJECTION, SOLUTION INTRAVENOUS at 20:08

## 2018-05-14 RX ADMIN — METRONIDAZOLE 500 MG: 500 TABLET ORAL at 15:16

## 2018-05-14 RX ADMIN — HYDROCORTISONE: 1 CREAM TOPICAL at 21:42

## 2018-05-14 RX ADMIN — METRONIDAZOLE 500 MG: 500 TABLET ORAL at 08:41

## 2018-05-14 RX ADMIN — OLANZAPINE 20 MG: 5 TABLET, FILM COATED ORAL at 18:09

## 2018-05-14 RX ADMIN — CLARITHROMYCIN 500 MG: 250 TABLET, FILM COATED ORAL at 20:11

## 2018-05-14 RX ADMIN — ESCITALOPRAM OXALATE 10 MG: 10 TABLET ORAL at 08:43

## 2018-05-14 RX ADMIN — PROPRANOLOL HYDROCHLORIDE 20 MG: 20 TABLET ORAL at 15:16

## 2018-05-14 RX ADMIN — CALCIUM POLYCARBOPHIL 1250 MG: 625 TABLET, FILM COATED ORAL at 08:46

## 2018-05-14 RX ADMIN — DIVALPROEX SODIUM 500 MG: 500 TABLET, FILM COATED, EXTENDED RELEASE ORAL at 08:45

## 2018-05-14 RX ADMIN — CLARITHROMYCIN 500 MG: 250 TABLET, FILM COATED ORAL at 08:45

## 2018-05-14 RX ADMIN — CHLORPROMAZINE HYDROCHLORIDE 25 MG: 25 TABLET, SUGAR COATED ORAL at 15:16

## 2018-05-14 RX ADMIN — TRAZODONE HYDROCHLORIDE 50 MG: 50 TABLET ORAL at 20:11

## 2018-05-14 RX ADMIN — ENOXAPARIN SODIUM 40 MG: 40 INJECTION SUBCUTANEOUS at 08:46

## 2018-05-14 RX ADMIN — SODIUM CHLORIDE 125 ML/HR: 9 INJECTION, SOLUTION INTRAVENOUS at 04:04

## 2018-05-14 RX ADMIN — BENZTROPINE MESYLATE 2 MG: 1 TABLET ORAL at 08:45

## 2018-05-14 RX ADMIN — ENOXAPARIN SODIUM 40 MG: 40 INJECTION SUBCUTANEOUS at 20:10

## 2018-05-14 RX ADMIN — PROPRANOLOL HYDROCHLORIDE 20 MG: 20 TABLET ORAL at 21:42

## 2018-05-14 RX ADMIN — DIVALPROEX SODIUM 1000 MG: 500 TABLET, FILM COATED, EXTENDED RELEASE ORAL at 20:12

## 2018-05-14 RX ADMIN — FOLIC ACID 1 MG: 1 TABLET ORAL at 08:43

## 2018-05-14 RX ADMIN — Medication 5 MG: at 20:11

## 2018-05-14 RX ADMIN — CHLORPROMAZINE HYDROCHLORIDE 25 MG: 25 TABLET, SUGAR COATED ORAL at 08:41

## 2018-05-14 RX ADMIN — PROPRANOLOL HYDROCHLORIDE 20 MG: 20 TABLET ORAL at 08:42

## 2018-05-14 RX ADMIN — CHLORPROMAZINE HYDROCHLORIDE 25 MG: 25 TABLET, SUGAR COATED ORAL at 21:43

## 2018-05-14 RX ADMIN — CALCIUM POLYCARBOPHIL 1250 MG: 625 TABLET, FILM COATED ORAL at 20:11

## 2018-05-14 RX ADMIN — BENZTROPINE MESYLATE 2 MG: 1 TABLET ORAL at 20:11

## 2018-05-15 LAB
ANION GAP SERPL CALCULATED.3IONS-SCNC: 8 MMOL/L (ref 3–11)
BASOPHILS # BLD AUTO: 0.04 10*3/MM3 (ref 0–0.2)
BASOPHILS NFR BLD AUTO: 0.6 % (ref 0–1)
BUN BLD-MCNC: 13 MG/DL (ref 9–23)
BUN/CREAT SERPL: 21.7 (ref 7–25)
CALCIUM SPEC-SCNC: 9 MG/DL (ref 8.7–10.4)
CHLORIDE SERPL-SCNC: 106 MMOL/L (ref 99–109)
CO2 SERPL-SCNC: 27 MMOL/L (ref 20–31)
CREAT BLD-MCNC: 0.6 MG/DL (ref 0.6–1.3)
DEPRECATED RDW RBC AUTO: 52.2 FL (ref 37–54)
EOSINOPHIL # BLD AUTO: 0.12 10*3/MM3 (ref 0–0.3)
EOSINOPHIL NFR BLD AUTO: 1.9 % (ref 0–3)
ERYTHROCYTE [DISTWIDTH] IN BLOOD BY AUTOMATED COUNT: 16.2 % (ref 11.3–14.5)
GFR SERPL CREATININE-BSD FRML MDRD: 121 ML/MIN/1.73
GLUCOSE BLD-MCNC: 89 MG/DL (ref 70–100)
HCT VFR BLD AUTO: 31 % (ref 34.5–44)
HGB BLD-MCNC: 9.9 G/DL (ref 11.5–15.5)
IMM GRANULOCYTES # BLD: 0.06 10*3/MM3 (ref 0–0.03)
IMM GRANULOCYTES NFR BLD: 0.9 % (ref 0–0.6)
LYMPHOCYTES # BLD AUTO: 3.73 10*3/MM3 (ref 0.6–4.8)
LYMPHOCYTES NFR BLD AUTO: 57.8 % (ref 24–44)
MCH RBC QN AUTO: 27.9 PG (ref 27–31)
MCHC RBC AUTO-ENTMCNC: 31.9 G/DL (ref 32–36)
MCV RBC AUTO: 87.3 FL (ref 80–99)
MONOCYTES # BLD AUTO: 0.72 10*3/MM3 (ref 0–1)
MONOCYTES NFR BLD AUTO: 11.2 % (ref 0–12)
NEUTROPHILS # BLD AUTO: 1.78 10*3/MM3 (ref 1.5–8.3)
NEUTROPHILS NFR BLD AUTO: 27.6 % (ref 41–71)
PLATELET # BLD AUTO: 406 10*3/MM3 (ref 150–450)
PMV BLD AUTO: 9 FL (ref 6–12)
POTASSIUM BLD-SCNC: 3.9 MMOL/L (ref 3.5–5.5)
RBC # BLD AUTO: 3.55 10*6/MM3 (ref 3.89–5.14)
SODIUM BLD-SCNC: 141 MMOL/L (ref 132–146)
WBC NRBC COR # BLD: 6.45 10*3/MM3 (ref 3.5–10.8)

## 2018-05-15 PROCEDURE — 97116 GAIT TRAINING THERAPY: CPT

## 2018-05-15 PROCEDURE — 97530 THERAPEUTIC ACTIVITIES: CPT

## 2018-05-15 PROCEDURE — 25010000002 ZIPRASIDONE MESYLATE PER 10 MG: Performed by: NURSE PRACTITIONER

## 2018-05-15 PROCEDURE — 85025 COMPLETE CBC W/AUTO DIFF WBC: CPT | Performed by: NURSE PRACTITIONER

## 2018-05-15 PROCEDURE — 99232 SBSQ HOSP IP/OBS MODERATE 35: CPT | Performed by: INTERNAL MEDICINE

## 2018-05-15 PROCEDURE — 80048 BASIC METABOLIC PNL TOTAL CA: CPT | Performed by: NURSE PRACTITIONER

## 2018-05-15 PROCEDURE — 25010000002 ENOXAPARIN PER 10 MG: Performed by: NURSE PRACTITIONER

## 2018-05-15 RX ORDER — ZIPRASIDONE MESYLATE 20 MG/ML
10 INJECTION, POWDER, LYOPHILIZED, FOR SOLUTION INTRAMUSCULAR ONCE
Status: COMPLETED | OUTPATIENT
Start: 2018-05-15 | End: 2018-05-15

## 2018-05-15 RX ORDER — WATER 1000 ML/1000ML
2.1 INJECTION, SOLUTION INTRAVENOUS ONCE AS NEEDED
Status: DISCONTINUED | OUTPATIENT
Start: 2018-05-15 | End: 2018-05-16 | Stop reason: HOSPADM

## 2018-05-15 RX ADMIN — CHLORPROMAZINE HYDROCHLORIDE 25 MG: 25 TABLET, SUGAR COATED ORAL at 21:00

## 2018-05-15 RX ADMIN — METRONIDAZOLE 500 MG: 500 TABLET ORAL at 21:00

## 2018-05-15 RX ADMIN — Medication 5 MG: at 22:34

## 2018-05-15 RX ADMIN — OLANZAPINE 20 MG: 5 TABLET, FILM COATED ORAL at 18:11

## 2018-05-15 RX ADMIN — PROPRANOLOL HYDROCHLORIDE 20 MG: 20 TABLET ORAL at 13:39

## 2018-05-15 RX ADMIN — HYDROCORTISONE: 1 CREAM TOPICAL at 21:01

## 2018-05-15 RX ADMIN — DIVALPROEX SODIUM 1000 MG: 500 TABLET, FILM COATED, EXTENDED RELEASE ORAL at 21:01

## 2018-05-15 RX ADMIN — POLYETHYLENE GLYCOL (3350) 17 G: 17 POWDER, FOR SOLUTION ORAL at 08:09

## 2018-05-15 RX ADMIN — CLARITHROMYCIN 500 MG: 250 TABLET, FILM COATED ORAL at 08:09

## 2018-05-15 RX ADMIN — PANTOPRAZOLE SODIUM 40 MG: 40 TABLET, DELAYED RELEASE ORAL at 05:47

## 2018-05-15 RX ADMIN — PROPRANOLOL HYDROCHLORIDE 20 MG: 20 TABLET ORAL at 21:00

## 2018-05-15 RX ADMIN — CHLORPROMAZINE HYDROCHLORIDE 25 MG: 25 TABLET, SUGAR COATED ORAL at 05:47

## 2018-05-15 RX ADMIN — CHLORPROMAZINE HYDROCHLORIDE 25 MG: 25 TABLET, SUGAR COATED ORAL at 13:39

## 2018-05-15 RX ADMIN — ESCITALOPRAM OXALATE 10 MG: 10 TABLET ORAL at 08:09

## 2018-05-15 RX ADMIN — CALCIUM POLYCARBOPHIL 1250 MG: 625 TABLET, FILM COATED ORAL at 21:01

## 2018-05-15 RX ADMIN — PROPRANOLOL HYDROCHLORIDE 20 MG: 20 TABLET ORAL at 05:51

## 2018-05-15 RX ADMIN — CALCIUM POLYCARBOPHIL 1250 MG: 625 TABLET, FILM COATED ORAL at 08:09

## 2018-05-15 RX ADMIN — BENZTROPINE MESYLATE 2 MG: 1 TABLET ORAL at 21:00

## 2018-05-15 RX ADMIN — METRONIDAZOLE 500 MG: 500 TABLET ORAL at 05:47

## 2018-05-15 RX ADMIN — ZIPRASIDONE MESYLATE 10 MG: 20 INJECTION, POWDER, LYOPHILIZED, FOR SOLUTION INTRAMUSCULAR at 22:51

## 2018-05-15 RX ADMIN — TRAZODONE HYDROCHLORIDE 50 MG: 50 TABLET ORAL at 21:00

## 2018-05-15 RX ADMIN — ACETAMINOPHEN 650 MG: 325 TABLET ORAL at 15:09

## 2018-05-15 RX ADMIN — ENOXAPARIN SODIUM 40 MG: 40 INJECTION SUBCUTANEOUS at 21:01

## 2018-05-15 RX ADMIN — BENZTROPINE MESYLATE 2 MG: 1 TABLET ORAL at 08:09

## 2018-05-15 RX ADMIN — FOLIC ACID 1 MG: 1 TABLET ORAL at 08:09

## 2018-05-15 RX ADMIN — Medication 5 MG: at 21:00

## 2018-05-15 RX ADMIN — DIVALPROEX SODIUM 500 MG: 500 TABLET, FILM COATED, EXTENDED RELEASE ORAL at 08:09

## 2018-05-15 RX ADMIN — HYDROCORTISONE: 1 CREAM TOPICAL at 08:15

## 2018-05-15 RX ADMIN — METRONIDAZOLE 500 MG: 500 TABLET ORAL at 13:39

## 2018-05-15 RX ADMIN — CLARITHROMYCIN 500 MG: 250 TABLET, FILM COATED ORAL at 21:00

## 2018-05-16 ENCOUNTER — HOSPITAL ENCOUNTER (OUTPATIENT)
Facility: HOSPITAL | Age: 28
Setting detail: OBSERVATION
Discharge: HOME | End: 2018-05-17
Attending: EMERGENCY MEDICINE | Admitting: INTERNAL MEDICINE

## 2018-05-16 VITALS
DIASTOLIC BLOOD PRESSURE: 76 MMHG | RESPIRATION RATE: 18 BRPM | HEIGHT: 63 IN | TEMPERATURE: 96.5 F | OXYGEN SATURATION: 95 % | SYSTOLIC BLOOD PRESSURE: 122 MMHG | BODY MASS INDEX: 43.64 KG/M2 | HEART RATE: 84 BPM | WEIGHT: 246.31 LBS

## 2018-05-16 DIAGNOSIS — Z86.59 HISTORY OF POSTTRAUMATIC STRESS DISORDER (PTSD): ICD-10-CM
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
## 2018-05-16 DIAGNOSIS — Z87.39 HISTORY OF RHABDOMYOLYSIS: ICD-10-CM

## 2018-05-16 DIAGNOSIS — S70.01XA CONTUSION OF RIGHT HIP, INITIAL ENCOUNTER: ICD-10-CM

## 2018-05-16 DIAGNOSIS — Z86.59 HISTORY OF ADJUSTMENT DISORDER: ICD-10-CM

## 2018-05-16 DIAGNOSIS — F79 MENTAL RETARDATION: ICD-10-CM

## 2018-05-16 PROBLEM — R53.1 GENERALIZED WEAKNESS: Status: RESOLVED | Noted: 2018-05-02 | Resolved: 2018-05-16

## 2018-05-16 PROBLEM — M25.511 ACUTE PAIN OF RIGHT SHOULDER: Status: RESOLVED | Noted: 2018-05-02 | Resolved: 2018-05-16

## 2018-05-16 PROBLEM — M62.82 NON-TRAUMATIC RHABDOMYOLYSIS: Status: RESOLVED | Noted: 2018-04-24 | Resolved: 2018-05-16

## 2018-05-16 PROBLEM — D72.829 LEUKOCYTOSIS: Status: RESOLVED | Noted: 2018-05-02 | Resolved: 2018-05-16

## 2018-05-16 PROCEDURE — 25010000002 ENOXAPARIN PER 10 MG: Performed by: NURSE PRACTITIONER

## 2018-05-16 PROCEDURE — 99284 EMERGENCY DEPT VISIT MOD MDM: CPT

## 2018-05-16 PROCEDURE — 99239 HOSP IP/OBS DSCHRG MGMT >30: CPT | Performed by: NURSE PRACTITIONER

## 2018-05-16 RX ORDER — OLANZAPINE 20 MG/1
20 TABLET ORAL NIGHTLY
Qty: 30 TABLET | Refills: 0 | Status: ON HOLD | OUTPATIENT
Start: 2018-05-16 | End: 2018-05-17

## 2018-05-16 RX ORDER — DIAPER,BRIEF,INFANT-TODD,DISP
EACH MISCELLANEOUS EVERY 12 HOURS SCHEDULED
Qty: 14 G | Refills: 0 | Status: SHIPPED | OUTPATIENT
Start: 2018-05-16

## 2018-05-16 RX ADMIN — CHLORPROMAZINE HYDROCHLORIDE 25 MG: 25 TABLET, SUGAR COATED ORAL at 13:35

## 2018-05-16 RX ADMIN — PROPRANOLOL HYDROCHLORIDE 20 MG: 20 TABLET ORAL at 13:35

## 2018-05-16 RX ADMIN — HYDROCORTISONE: 1 CREAM TOPICAL at 10:16

## 2018-05-16 RX ADMIN — POLYETHYLENE GLYCOL (3350) 17 G: 17 POWDER, FOR SOLUTION ORAL at 09:46

## 2018-05-16 RX ADMIN — ENOXAPARIN SODIUM 40 MG: 40 INJECTION SUBCUTANEOUS at 09:46

## 2018-05-16 RX ADMIN — FOLIC ACID 1 MG: 1 TABLET ORAL at 09:46

## 2018-05-16 RX ADMIN — DIVALPROEX SODIUM 500 MG: 500 TABLET, FILM COATED, EXTENDED RELEASE ORAL at 09:46

## 2018-05-16 RX ADMIN — CHLORPROMAZINE HYDROCHLORIDE 25 MG: 25 TABLET, SUGAR COATED ORAL at 07:39

## 2018-05-16 RX ADMIN — PANTOPRAZOLE SODIUM 40 MG: 40 TABLET, DELAYED RELEASE ORAL at 07:39

## 2018-05-16 RX ADMIN — PROPRANOLOL HYDROCHLORIDE 20 MG: 20 TABLET ORAL at 07:39

## 2018-05-16 RX ADMIN — CALCIUM POLYCARBOPHIL 1250 MG: 625 TABLET, FILM COATED ORAL at 09:45

## 2018-05-16 RX ADMIN — ACETAMINOPHEN 650 MG: 325 TABLET ORAL at 14:29

## 2018-05-16 RX ADMIN — ESCITALOPRAM OXALATE 10 MG: 10 TABLET ORAL at 10:15

## 2018-05-16 RX ADMIN — BENZTROPINE MESYLATE 2 MG: 1 TABLET ORAL at 09:46

## 2018-05-17 ENCOUNTER — APPOINTMENT (OUTPATIENT)
Dept: GENERAL RADIOLOGY | Facility: HOSPITAL | Age: 28
End: 2018-05-17

## 2018-05-17 ENCOUNTER — APPOINTMENT (OUTPATIENT)
Dept: CT IMAGING | Facility: HOSPITAL | Age: 28
End: 2018-05-17

## 2018-05-17 VITALS
TEMPERATURE: 98.2 F | DIASTOLIC BLOOD PRESSURE: 67 MMHG | BODY MASS INDEX: 42.48 KG/M2 | SYSTOLIC BLOOD PRESSURE: 112 MMHG | HEIGHT: 62 IN | WEIGHT: 230.82 LBS | OXYGEN SATURATION: 96 % | HEART RATE: 92 BPM | RESPIRATION RATE: 20 BRPM
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
## 2018-05-17 LAB
ALBUMIN SERPL-MCNC: 4.3 G/DL (ref 3.2–4.8)
ALBUMIN/GLOB SERPL: 1.4 G/DL (ref 1.5–2.5)
ALP SERPL-CCNC: 84 U/L (ref 25–100)
ALT SERPL W P-5'-P-CCNC: 23 U/L (ref 7–40)
ANION GAP SERPL CALCULATED.3IONS-SCNC: 8 MMOL/L (ref 3–11)
AST SERPL-CCNC: 25 U/L (ref 0–33)
B-HCG UR QL: NEGATIVE
BASOPHILS # BLD AUTO: 0.03 10*3/MM3 (ref 0–0.2)
BASOPHILS NFR BLD AUTO: 0.3 % (ref 0–1)
BILIRUB SERPL-MCNC: 0.3 MG/DL (ref 0.3–1.2)
BILIRUB UR QL STRIP: NEGATIVE
BUN BLD-MCNC: 15 MG/DL (ref 9–23)
BUN/CREAT SERPL: 25 (ref 7–25)
CALCIUM SPEC-SCNC: 10.1 MG/DL (ref 8.7–10.4)
CHLORIDE SERPL-SCNC: 99 MMOL/L (ref 99–109)
CK SERPL-CCNC: 82 U/L (ref 26–174)
CLARITY UR: CLEAR
CO2 SERPL-SCNC: 30 MMOL/L (ref 20–31)
COLOR UR: YELLOW
CREAT BLD-MCNC: 0.6 MG/DL (ref 0.6–1.3)
DEPRECATED RDW RBC AUTO: 50.2 FL (ref 37–54)
EOSINOPHIL # BLD AUTO: 0.12 10*3/MM3 (ref 0–0.3)
EOSINOPHIL NFR BLD AUTO: 1.3 % (ref 0–3)
ERYTHROCYTE [DISTWIDTH] IN BLOOD BY AUTOMATED COUNT: 16 % (ref 11.3–14.5)
GFR SERPL CREATININE-BSD FRML MDRD: 121 ML/MIN/1.73
GLOBULIN UR ELPH-MCNC: 3 GM/DL
GLUCOSE BLD-MCNC: 87 MG/DL (ref 70–100)
GLUCOSE BLDC GLUCOMTR-MCNC: 95 MG/DL (ref 70–130)
GLUCOSE UR STRIP-MCNC: NEGATIVE MG/DL
HCT VFR BLD AUTO: 35.9 % (ref 34.5–44)
HGB BLD-MCNC: 11.4 G/DL (ref 11.5–15.5)
HGB UR QL STRIP.AUTO: NEGATIVE
IMM GRANULOCYTES # BLD: 0.08 10*3/MM3 (ref 0–0.03)
IMM GRANULOCYTES NFR BLD: 0.9 % (ref 0–0.6)
INTERNAL NEGATIVE CONTROL: NEGATIVE
INTERNAL POSITIVE CONTROL: POSITIVE
KETONES UR QL STRIP: NEGATIVE
LEUKOCYTE ESTERASE UR QL STRIP.AUTO: NEGATIVE
LYMPHOCYTES # BLD AUTO: 3.74 10*3/MM3 (ref 0.6–4.8)
LYMPHOCYTES NFR BLD AUTO: 40.9 % (ref 24–44)
Lab: NORMAL
MCH RBC QN AUTO: 27.3 PG (ref 27–31)
MCHC RBC AUTO-ENTMCNC: 31.8 G/DL (ref 32–36)
MCV RBC AUTO: 85.9 FL (ref 80–99)
MONOCYTES # BLD AUTO: 0.99 10*3/MM3 (ref 0–1)
MONOCYTES NFR BLD AUTO: 10.8 % (ref 0–12)
NEUTROPHILS # BLD AUTO: 4.26 10*3/MM3 (ref 1.5–8.3)
NEUTROPHILS NFR BLD AUTO: 46.7 % (ref 41–71)
NITRITE UR QL STRIP: NEGATIVE
PH UR STRIP.AUTO: 8.5 [PH] (ref 5–8)
PLATELET # BLD AUTO: 398 10*3/MM3 (ref 150–450)
PMV BLD AUTO: 9.7 FL (ref 6–12)
POTASSIUM BLD-SCNC: 4.1 MMOL/L (ref 3.5–5.5)
PROT SERPL-MCNC: 7.3 G/DL (ref 5.7–8.2)
PROT UR QL STRIP: NEGATIVE
RBC # BLD AUTO: 4.18 10*6/MM3 (ref 3.89–5.14)
SODIUM BLD-SCNC: 137 MMOL/L (ref 132–146)
SP GR UR STRIP: 1.01 (ref 1–1.03)
UROBILINOGEN UR QL STRIP: ABNORMAL
WBC NRBC COR # BLD: 9.14 10*3/MM3 (ref 3.5–10.8)

## 2018-05-17 PROCEDURE — G0378 HOSPITAL OBSERVATION PER HR: HCPCS

## 2018-05-17 PROCEDURE — 82962 GLUCOSE BLOOD TEST: CPT

## 2018-05-17 PROCEDURE — 73502 X-RAY EXAM HIP UNI 2-3 VIEWS: CPT

## 2018-05-17 PROCEDURE — 25010000002 ENOXAPARIN PER 10 MG: Performed by: NURSE PRACTITIONER

## 2018-05-17 PROCEDURE — 70450 CT HEAD/BRAIN W/O DYE: CPT

## 2018-05-17 PROCEDURE — 96372 THER/PROPH/DIAG INJ SC/IM: CPT

## 2018-05-17 PROCEDURE — 85025 COMPLETE CBC W/AUTO DIFF WBC: CPT | Performed by: PHYSICIAN ASSISTANT

## 2018-05-17 PROCEDURE — 99220 PR INITIAL OBSERVATION CARE/DAY 70 MINUTES: CPT | Performed by: INTERNAL MEDICINE

## 2018-05-17 PROCEDURE — 80053 COMPREHEN METABOLIC PANEL: CPT | Performed by: PHYSICIAN ASSISTANT

## 2018-05-17 PROCEDURE — 82550 ASSAY OF CK (CPK): CPT | Performed by: PHYSICIAN ASSISTANT

## 2018-05-17 PROCEDURE — 81003 URINALYSIS AUTO W/O SCOPE: CPT | Performed by: PHYSICIAN ASSISTANT

## 2018-05-17 PROCEDURE — 72100 X-RAY EXAM L-S SPINE 2/3 VWS: CPT

## 2018-05-17 RX ORDER — TRAZODONE HYDROCHLORIDE 50 MG/1
50 TABLET ORAL NIGHTLY
Status: DISCONTINUED | OUTPATIENT
Start: 2018-05-17 | End: 2018-05-17 | Stop reason: HOSPADM

## 2018-05-17 RX ORDER — DIAPER,BRIEF,INFANT-TODD,DISP
EACH MISCELLANEOUS EVERY 12 HOURS SCHEDULED
Status: DISCONTINUED | OUTPATIENT
Start: 2018-05-17 | End: 2018-05-17 | Stop reason: HOSPADM

## 2018-05-17 RX ORDER — CALCIUM CARBONATE 200(500)MG
1 TABLET,CHEWABLE ORAL 3 TIMES DAILY PRN
Status: DISCONTINUED | OUTPATIENT
Start: 2018-05-17 | End: 2018-05-17 | Stop reason: HOSPADM

## 2018-05-17 RX ORDER — OLANZAPINE 5 MG/1
20 TABLET ORAL NIGHTLY
Status: DISCONTINUED | OUTPATIENT
Start: 2018-05-17 | End: 2018-05-17 | Stop reason: HOSPADM

## 2018-05-17 RX ORDER — CHLORPROMAZINE HYDROCHLORIDE 25 MG/1
25 TABLET, FILM COATED ORAL 3 TIMES DAILY
Qty: 90 TABLET | Refills: 0 | Status: SHIPPED | OUTPATIENT
Start: 2018-05-17 | End: 2018-05-23 | Stop reason: HOSPADM

## 2018-05-17 RX ORDER — MONTELUKAST SODIUM 10 MG/1
10 TABLET ORAL NIGHTLY
Status: DISCONTINUED | OUTPATIENT
Start: 2018-05-17 | End: 2018-05-17 | Stop reason: HOSPADM

## 2018-05-17 RX ORDER — FOLIC ACID 1 MG/1
1 TABLET ORAL DAILY
Status: DISCONTINUED | OUTPATIENT
Start: 2018-05-17 | End: 2018-05-17 | Stop reason: HOSPADM

## 2018-05-17 RX ORDER — SODIUM CHLORIDE 0.9 % (FLUSH) 0.9 %
1-10 SYRINGE (ML) INJECTION AS NEEDED
Status: DISCONTINUED | OUTPATIENT
Start: 2018-05-17 | End: 2018-05-17 | Stop reason: HOSPADM

## 2018-05-17 RX ORDER — CETIRIZINE HYDROCHLORIDE 10 MG/1
10 TABLET ORAL DAILY
Status: DISCONTINUED | OUTPATIENT
Start: 2018-05-17 | End: 2018-05-17 | Stop reason: HOSPADM

## 2018-05-17 RX ORDER — PANTOPRAZOLE SODIUM 40 MG/1
40 TABLET, DELAYED RELEASE ORAL DAILY
Status: DISCONTINUED | OUTPATIENT
Start: 2018-05-17 | End: 2018-05-17 | Stop reason: HOSPADM

## 2018-05-17 RX ORDER — DIVALPROEX SODIUM 500 MG/1
500 TABLET, EXTENDED RELEASE ORAL DAILY
Status: DISCONTINUED | OUTPATIENT
Start: 2018-05-17 | End: 2018-05-17 | Stop reason: HOSPADM

## 2018-05-17 RX ORDER — BENZTROPINE MESYLATE 1 MG/1
2 TABLET ORAL EVERY 12 HOURS SCHEDULED
Status: DISCONTINUED | OUTPATIENT
Start: 2018-05-17 | End: 2018-05-17 | Stop reason: HOSPADM

## 2018-05-17 RX ORDER — HALOPERIDOL 5 MG/1
5 TABLET ORAL 2 TIMES DAILY PRN
Status: DISCONTINUED | OUTPATIENT
Start: 2018-05-17 | End: 2018-05-17 | Stop reason: HOSPADM

## 2018-05-17 RX ORDER — PROPRANOLOL HYDROCHLORIDE 20 MG/1
20 TABLET ORAL 3 TIMES DAILY
Status: DISCONTINUED | OUTPATIENT
Start: 2018-05-17 | End: 2018-05-17 | Stop reason: HOSPADM

## 2018-05-17 RX ORDER — FAMOTIDINE 20 MG/1
20 TABLET, FILM COATED ORAL DAILY
Status: DISCONTINUED | OUTPATIENT
Start: 2018-05-17 | End: 2018-05-17 | Stop reason: HOSPADM

## 2018-05-17 RX ORDER — ATORVASTATIN CALCIUM 20 MG/1
20 TABLET, FILM COATED ORAL NIGHTLY
Status: DISCONTINUED | OUTPATIENT
Start: 2018-05-17 | End: 2018-05-17 | Stop reason: HOSPADM

## 2018-05-17 RX ORDER — GUAIFENESIN 200 MG/1
400 TABLET ORAL EVERY 4 HOURS PRN
Status: DISCONTINUED | OUTPATIENT
Start: 2018-05-17 | End: 2018-05-17 | Stop reason: HOSPADM

## 2018-05-17 RX ORDER — FLUTICASONE PROPIONATE 50 MCG
1 SPRAY, SUSPENSION (ML) NASAL DAILY
Status: DISCONTINUED | OUTPATIENT
Start: 2018-05-17 | End: 2018-05-17 | Stop reason: HOSPADM

## 2018-05-17 RX ORDER — CALCIUM POLYCARBOPHIL 625 MG
1250 TABLET ORAL DAILY
Status: DISCONTINUED | OUTPATIENT
Start: 2018-05-17 | End: 2018-05-17 | Stop reason: HOSPADM

## 2018-05-17 RX ORDER — DIVALPROEX SODIUM 500 MG/1
500 TABLET, EXTENDED RELEASE ORAL DAILY
Qty: 30 TABLET | Refills: 0 | Status: ON HOLD | OUTPATIENT
Start: 2018-05-17 | End: 2018-05-23

## 2018-05-17 RX ORDER — LORAZEPAM 0.5 MG/1
0.5 TABLET ORAL EVERY 8 HOURS PRN
Status: DISCONTINUED | OUTPATIENT
Start: 2018-05-17 | End: 2018-05-17 | Stop reason: HOSPADM

## 2018-05-17 RX ORDER — OLANZAPINE 20 MG/1
20 TABLET ORAL NIGHTLY
Qty: 30 TABLET | Refills: 0 | Status: SHIPPED | OUTPATIENT
Start: 2018-05-17

## 2018-05-17 RX ORDER — BENZTROPINE MESYLATE 2 MG/1
1 TABLET ORAL 3 TIMES DAILY
Qty: 90 TABLET | Refills: 0 | Status: ON HOLD | OUTPATIENT
Start: 2018-05-17 | End: 2018-05-23

## 2018-05-17 RX ORDER — BACITRACIN, NEOMYCIN, POLYMYXIN B 400; 3.5; 5 [USP'U]/G; MG/G; [USP'U]/G
OINTMENT TOPICAL 3 TIMES DAILY PRN
Status: DISCONTINUED | OUTPATIENT
Start: 2018-05-17 | End: 2018-05-17 | Stop reason: HOSPADM

## 2018-05-17 RX ORDER — CHLORPROMAZINE HYDROCHLORIDE 50 MG/1
50 TABLET, FILM COATED ORAL 3 TIMES DAILY PRN
Qty: 30 TABLET | Refills: 0 | Status: SHIPPED | OUTPATIENT
Start: 2018-05-17 | End: 2018-05-23 | Stop reason: HOSPADM

## 2018-05-17 RX ORDER — ACETAMINOPHEN 325 MG/1
650 TABLET ORAL EVERY 6 HOURS PRN
Status: DISCONTINUED | OUTPATIENT
Start: 2018-05-17 | End: 2018-05-17 | Stop reason: HOSPADM

## 2018-05-17 RX ORDER — DIVALPROEX SODIUM 500 MG/1
1000 TABLET, EXTENDED RELEASE ORAL NIGHTLY
Qty: 60 TABLET | Refills: 0 | Status: ON HOLD | OUTPATIENT
Start: 2018-05-17 | End: 2018-05-23

## 2018-05-17 RX ORDER — CHLORPROMAZINE HYDROCHLORIDE 25 MG/1
25 TABLET, FILM COATED ORAL 3 TIMES DAILY
Status: DISCONTINUED | OUTPATIENT
Start: 2018-05-17 | End: 2018-05-17 | Stop reason: HOSPADM

## 2018-05-17 RX ORDER — PROMETHAZINE HYDROCHLORIDE 25 MG/1
25 TABLET ORAL EVERY 6 HOURS PRN
Status: DISCONTINUED | OUTPATIENT
Start: 2018-05-17 | End: 2018-05-17 | Stop reason: HOSPADM

## 2018-05-17 RX ORDER — LORAZEPAM 2 MG/ML
INJECTION INTRAMUSCULAR
Status: DISCONTINUED
Start: 2018-05-17 | End: 2018-05-17 | Stop reason: WASHOUT

## 2018-05-17 RX ORDER — ONDANSETRON 4 MG/1
8 TABLET, ORALLY DISINTEGRATING ORAL EVERY 8 HOURS PRN
Status: DISCONTINUED | OUTPATIENT
Start: 2018-05-17 | End: 2018-05-17 | Stop reason: HOSPADM

## 2018-05-17 RX ORDER — ALBUTEROL SULFATE 2.5 MG/3ML
2.5 SOLUTION RESPIRATORY (INHALATION) EVERY 4 HOURS PRN
Status: DISCONTINUED | OUTPATIENT
Start: 2018-05-17 | End: 2018-05-17 | Stop reason: HOSPADM

## 2018-05-17 RX ORDER — DIVALPROEX SODIUM 500 MG/1
1000 TABLET, EXTENDED RELEASE ORAL NIGHTLY
Status: DISCONTINUED | OUTPATIENT
Start: 2018-05-17 | End: 2018-05-17 | Stop reason: HOSPADM

## 2018-05-17 RX ORDER — ESCITALOPRAM OXALATE 20 MG/1
10 TABLET ORAL EVERY MORNING
Status: DISCONTINUED | OUTPATIENT
Start: 2018-05-17 | End: 2018-05-17 | Stop reason: HOSPADM

## 2018-05-17 RX ORDER — ALBUTEROL SULFATE 90 UG/1
2 AEROSOL, METERED RESPIRATORY (INHALATION) EVERY 4 HOURS PRN
Status: DISCONTINUED | OUTPATIENT
Start: 2018-05-17 | End: 2018-05-17 | Stop reason: SDUPTHER

## 2018-05-17 RX ADMIN — FLUTICASONE PROPIONATE 1 SPRAY: 50 SPRAY, METERED NASAL at 08:28

## 2018-05-17 RX ADMIN — ACETAMINOPHEN 650 MG: 325 TABLET, FILM COATED ORAL at 08:27

## 2018-05-17 RX ADMIN — FOLIC ACID 1 MG: 1 TABLET ORAL at 08:36

## 2018-05-17 RX ADMIN — POLYETHYLENE GLYCOL (3350) 17 G: 17 POWDER, FOR SOLUTION ORAL at 08:37

## 2018-05-17 RX ADMIN — PANTOPRAZOLE SODIUM 40 MG: 40 TABLET, DELAYED RELEASE ORAL at 08:28

## 2018-05-17 RX ADMIN — PROPRANOLOL HYDROCHLORIDE 20 MG: 20 TABLET ORAL at 08:42

## 2018-05-17 RX ADMIN — ENOXAPARIN SODIUM 40 MG: 40 INJECTION SUBCUTANEOUS at 08:36

## 2018-05-17 RX ADMIN — CHLORPROMAZINE HYDROCHLORIDE 25 MG: 25 TABLET, SUGAR COATED ORAL at 08:36

## 2018-05-17 RX ADMIN — FAMOTIDINE 20 MG: 20 TABLET ORAL at 08:28

## 2018-05-17 RX ADMIN — HYDROCORTISONE: 1 CREAM TOPICAL at 08:50

## 2018-05-17 RX ADMIN — CALCIUM POLYCARBOPHIL 1250 MG: 625 TABLET, FILM COATED ORAL at 08:42

## 2018-05-17 RX ADMIN — DIVALPROEX SODIUM 500 MG: 500 TABLET, FILM COATED, EXTENDED RELEASE ORAL at 08:44

## 2018-05-17 RX ADMIN — ESCITALOPRAM OXALATE 10 MG: 20 TABLET ORAL at 08:33

## 2018-05-17 RX ADMIN — BENZTROPINE MESYLATE 2 MG: 1 TABLET ORAL at 08:43

## 2018-05-17 RX ADMIN — CETIRIZINE HYDROCHLORIDE 10 MG: 10 TABLET, FILM COATED ORAL at 08:37

## 2018-05-17 RX ADMIN — HALOPERIDOL 5 MG: 5 TABLET ORAL at 10:46

## 2018-05-17 RX ADMIN — LORAZEPAM 0.5 MG: 0.5 TABLET ORAL at 11:16

## 2018-05-17 NOTE — H&P
[facility]  HISTORY AND PHYSICAL    Patient Name: Fartun Aimn  : 1990  MRN: [id number]  Primary Care Physician: ADAM Harkins    Subjective   Subjective     Chief Complaint:  placement    HPI:  Fartun Amin is a 27 y.o. female who was hospitalized here -16 (refer to discharge summary for details) who was discharged from here and presented to the [facility] for admission.  [facility] did not feel pt needed admission so sent pt back to our ER by taxi as her group home would not be able to take her until today.  Called from ER b/c they do not want pt to wait in ER while awaiting disposition to her group home.  There have been no changes to pt.  Labs and vitals are normal.      Review of Systems      Otherwise 10-system ROS reviewed and is negative except as mentioned in the HPI.    Personal History     Past Medical History:   Diagnosis Date   • Abnormal drug screen 2018   • ADHD (attention deficit hyperactivity disorder)    • Adjustment disorder    • LORE (acute kidney injury) 2018   • Altered mental status 2018   • Asthma    • Cognitive impairment    • GERD (gastroesophageal reflux disease)    • Hearing impairment    • HTN (hypertension) 2018   • Hyperkalemia 2018   • Hypertension    • Intermittent explosive disorder    • Mood disorder    • MR (mental retardation), moderate    • Obesity    • PCOS (polycystic ovarian syndrome)    • Personality disorder    • PTSD (post-traumatic stress disorder)    • QT prolongation 2018   • Sepsis 2018   • Suicide attempt by drug ingestion 2018   • UTI (urinary tract infection) 2018       History reviewed. No pertinent surgical history.    Family History: Family history is unknown by patient.     Social History:  reports that she has never smoked. She has never used smokeless tobacco. She reports that she does not drink alcohol or use drugs.  Social History     Social  History Narrative    Lives in a group home. Has a state guardian.       Medications:    (Not in a hospital admission)    Allergies   Allergen Reactions   • Milk-Related Compounds Other (See Comments)     sensitivity   • Penicillins    • Sulfa Antibiotics        Objective   Objective     Vital Signs:   Temp:  [96.5 °F (35.8 °C)-98.5 °F (36.9 °C)] 98.5 °F (36.9 °C)  Heart Rate:  [83-92] 83  Resp:  [16-18] 16  BP: (114-126)/(72-83) 126/83        Physical Exam   Gen; asleep, resting comfortable  Cv; rrr, no mrg  L; faint end exp wheeze t/o  Abd; soft, +bs, ntnd  Ext; no cce, 2+ pulses  Skin; cdi, warm  Neuro; grossly intact  Psych; asleep    Results Reviewed:  I have personally reviewed current lab, radiology, and data and agree.      Results from last 7 days  Lab Units 05/17/18  0216  05/13/18  0938   WBC 10*3/mm3 9.14  < > 5.57   HEMOGLOBIN g/dL 11.4*  < > 9.5*   HEMATOCRIT % 35.9  < > 30.2*   PLATELETS 10*3/mm3 398  < > 441   INR   --   --  1.05   < > = values in this interval not displayed.    Results from last 7 days  Lab Units 05/17/18  0216   SODIUM mmol/L 137   POTASSIUM mmol/L 4.1   CHLORIDE mmol/L 99   CO2 mmol/L 30.0   BUN mg/dL 15   CREATININE mg/dL 0.60   GLUCOSE mg/dL 87   CALCIUM mg/dL 10.1   ALT (SGPT) U/L 23   AST (SGOT) U/L 25     Estimated Creatinine Clearance: 161.7 mL/min (by C-G formula based on SCr of 0.6 mg/dL).  Brief Urine Lab Results  (Last result in the past 365 days)      Color   Clarity   Blood   Leuk Est   Nitrite   Protein   CREAT   Urine HCG        05/17/18 0115               Negative  Comment:  EXP: 2019-12-31         No results found for: BNP  No results found for: PHART  Imaging Results (last 24 hours)     Procedure Component Value Units Date/Time    XR Hip With or Without Pelvis 2 - 3 View Right [[id number]] Collected:  05/17/18 0000     Updated:  05/17/18 0202    Narrative:       EXAM:    XR Right Hip With Pelvis When Performed, 2 or 3 Views    EXAM DATE/TIME:    5/17/2018 12:00  "AM    CLINICAL HISTORY:    27 years old, female; Injury or trauma; Fall; Initial encounter; Sprain or   strain; Right; Hip; Injury date: 05/17/2018; Injury details: Patient states   that \"she had a seizure and fell. \" patient complains of right hip pain;   Additional info: Fall injury    TECHNIQUE:    Two or three views of the right hip, with pelvis when performed.    COMPARISON:    CR - XR PELVIS 1 OR 2 VW 2018-04-26 14:49    FINDINGS:    Bones/joints:  Unremarkable.  No acute fracture.  No dislocation.    Soft tissues:  Unremarkable.      Impression:         No acute abnormality.    THIS DOCUMENT HAS BEEN ELECTRONICALLY SIGNED BY GURWINDER BLACKWOOD MD    XR Spine Lumbar 2 or 3 View [[id number]] Collected:  05/17/18 0002     Updated:  05/17/18 0201    Narrative:       EXAM:    XR Lumbar Spine, 2 or 3 Views    EXAM DATE/TIME:    5/17/2018 12:02 AM    CLINICAL HISTORY:    27 years old, female; Injury or trauma; Fall; Initial encounter; Sprain or   strain, lumbar ligaments; Injury date: 05/17/2018; Injury details: Patient   states that \"she had a seizure and fell. \" patient complains of right hip pain;   Additional info: Fall injury    TECHNIQUE:    Frontal and lateral views of the lumbar spine.    COMPARISON:    MRI LUMBAR SPINE WO CONTRAST 2018-04-28 12:17    FINDINGS:    Vertebrae:  Unremarkable.  No acute fracture.  Normal alignment.    Disc spaces:  No acute findings.  No significant narrowing.    Soft tissues:  Unremarkable.      Impression:         No acute abnormality.    THIS DOCUMENT HAS BEEN ELECTRONICALLY SIGNED BY GURWINDER BLACKWOOD MD    CT Head Without Contrast [[id number]] Collected:  05/17/18 0000     Updated:  05/17/18 0132    Narrative:       EXAM:    CT Head Without Intravenous Contrast    EXAM DATE/TIME:    5/17/2018 12:00 AM    CLINICAL HISTORY:    27 years old, female; Pain; Headache; Post-traumatic; Additional info: Fall   with positive loc and head injury    TECHNIQUE:    Axial computed tomography images of the " head/brain without intravenous   contrast.  All CT scans at this facility use one or more dose reduction   techniques, viz.: automated exposure control; ma/kV adjustment per patient size   (including targeted exams where dose is matched to indication; i.e. head); or   iterative reconstruction technique.    COMPARISON:    CT HEAD WO CONTRAST 2018-04-23 18:10    FINDINGS:    Brain:  Unremarkable.  No hemorrhage.  No significant white matter disease.    No edema.    Ventricles:  Unremarkable.  No ventriculomegaly.    Bones/joints:  Unremarkable.  No acute fracture.    Soft tissues:  Unremarkable.    Sinuses:  Unremarkable as visualized.  No acute sinusitis.    Mastoid air cells:  Unremarkable as visualized.  No mastoid effusion.      Impression:         No acute intracranial abnormality.    THIS DOCUMENT HAS BEEN ELECTRONICALLY SIGNED BY GURWINDER BLACKWOOD MD        Results for orders placed during the hospital encounter of 05/01/18   Adult Transthoracic Echo Complete W/ Cont if Necessary Per Protocol    Narrative · Left ventricular systolic function is normal. Estimated EF = 65%.  · Calculated right ventricular systolic pressure from tricuspid   regurgitation is 21 mmHg.          Assessment/Plan   Assessment / Plan           Assessment & Plan:  26 y/o female w/ multiple psych diagnoses as per last admission who presents back to ER for placement into her group home; the previous discussion was that case management is aware that she is being sent back from [facility] and plan on placement to her group home today.  ER has concerns that this will not in fact happen so have insisted that we admit pt.  No clinical changes; labs normal; cleared from [facility].      DVT prophylaxis:    CODE STATUS:  Prior    Admission Status:  I believe this patient meets  OBSERVATION status, however if further evaluation or treatment plans warrant, status may change.  Based upon current information, I predict patient's care encounter to be  less than or equal to 2 midnights.    Electronically signed by Mari Box MD, 05/17/18, 5:01 AM.

## 2018-05-17 NOTE — PROGRESS NOTES
Continued Stay Note  [facility]     Patient Name: Fartun Amin  MRN: [id number]  Today's Date: 5/17/2018    Admit Date: 5/16/2018          Discharge Plan     Row Name 05/17/18 1447       Plan    Plan Comments SW notified that pt's Olanzapine prescription requires a PA. SW completed PA through [employer].              Discharge Codes    No documentation.       Expected Discharge Date and Time     Expected Discharge Date Expected Discharge Time    May 17, 2018             LESLY Gomez

## 2018-05-17 NOTE — ED PROVIDER NOTES
"Subjective   Ms. Fartun Amin is a 27 year old female who presents to the ED after a fall. Patient reports that she was at [facility] being evaluated when she suddenly became dizzy, struck her right hip against a desk, and fell to the ground. She reports that she struck her head against the hard floor and lost consciousness. States she was unconscious for \"more than an hour.\" Patient regained consciousness to pain in her right hip, head, and low back. Reports feeling \"sore all over.\" Denies any nausea or vomiting. No abd pain. No chest pain. No other acute complaints at this time.    Initial history was given to us by the patient.  After discussing the case with the charge nurse, patient was actually sent here by doug from [facility] where she was evaluated.  They said that her group home would be unable to take her back until 5/17/2018 and she needed somewhere to stay.  After reviewing [facility], patient had an extensive hospitalization here at [facility] from 5/1/2018 3 5/16/2018.  Patient was found to have right hip myositis secondary to prolonged immobilization.  She also had rhabdomyolysis, which improved with IV fluid resuscitation.  She was consulted by orthopedics and recommended physical therapy.  She seemed to improve her ambulation and inpatient rehabilitation was not recommended.  She also was consult by GI due to swallowing some glass prior to admission with suicidal ideations.  They deferred on EGD at that time, and patient also was found to have H pylori and completed course of therapy.  Due to her reports of suicidal ideations, she was discharged to [facility] on 5/16/2018 for psychiatric evaluation.  Patient's PMH includes moderate MR, adjustment disorder, PTSD, Intermittent Explosive Disorder, PCOS, GERD, and HTN.        History provided by:  Patient  Fall   Mechanism of injury: fall    Injury location:  Leg  Leg injury location:  R hip  Incident location: [facility].  Fall:    "  Fall occurred:  Walking    Impact surface:  Hard floor    Point of impact:  Head  Protective equipment: none    Suspicion of alcohol use: no    Suspicion of drug use: no    Prior to arrival data:     Bystander interventions:  None    Blood loss:  None    Loss of consciousness: yes      Loss of consciousness duration:  1 hour    Amnesic to event: no      Airway interventions:  None    Breathing interventions:  None    IV access status:  None    IO access:  None    Fluids administered:  None    Cardiac interventions:  None    Medications administered:  None    Immobilization:  None    Airway condition since incident:  Stable    Breathing condition since incident:  Stable    Circulation condition since incident:  Stable    Mental status condition since incident:  Stable    Disability condition since incident:  Stable  Associated symptoms: back pain, headaches, loss of consciousness and neck pain    Associated symptoms: no abdominal pain, no chest pain, no nausea and no vomiting        Review of Systems   Cardiovascular: Negative for chest pain.   Gastrointestinal: Negative for abdominal pain, nausea and vomiting.   Musculoskeletal: Positive for arthralgias (R hip pain), back pain, myalgias and neck pain.   Neurological: Positive for dizziness, loss of consciousness and headaches.   All other systems reviewed and are negative.      Past Medical History:   Diagnosis Date   • Abnormal drug screen 4/23/2018   • ADHD (attention deficit hyperactivity disorder)    • Adjustment disorder    • LORE (acute kidney injury) 4/23/2018   • Altered mental status 4/23/2018   • Asthma    • Cognitive impairment    • GERD (gastroesophageal reflux disease)    • Hearing impairment    • HTN (hypertension) 4/23/2018   • Hyperkalemia 4/23/2018   • Hypertension    • Intermittent explosive disorder    • Mood disorder    • MR (mental retardation), moderate    • Obesity    • PCOS (polycystic ovarian syndrome)    • Personality disorder    • PTSD  (post-traumatic stress disorder)    • QT prolongation 4/23/2018   • Sepsis 4/23/2018   • Suicide attempt by drug ingestion 4/23/2018   • UTI (urinary tract infection) 4/23/2018       Allergies   Allergen Reactions   • Milk-Related Compounds Other (See Comments)     sensitivity   • Penicillins    • Sulfa Antibiotics        History reviewed. No pertinent surgical history.    Family History   Problem Relation Age of Onset   • Family history unknown: Yes       Social History     Social History   • Marital status: Single     Social History Main Topics   • Smoking status: Never Smoker   • Smokeless tobacco: Never Used   • Alcohol use No   • Drug use: No      Comment: drug screen positive in ER   • Sexual activity: Defer     Other Topics Concern   • Not on file     Social History Narrative    Lives in a group home. Has a state guardian.         Objective   Physical Exam   Constitutional: She is oriented to person, place, and time. She appears well-developed and well-nourished. No distress.   HENT:   Head: Normocephalic and atraumatic.   Tenderness to palpation over the left parietal scalp. No hematoma or sign of trauma.   Eyes: Conjunctivae are normal. No scleral icterus.   Neck: Normal range of motion. Neck supple.   Cardiovascular: Normal rate, regular rhythm and normal heart sounds.  Exam reveals no gallop and no friction rub.    No murmur heard.  Pulmonary/Chest: Effort normal and breath sounds normal. No respiratory distress. She has no wheezes. She has no rales.   Abdominal: Soft. Bowel sounds are normal. There is no tenderness. There is no guarding.   Musculoskeletal:        Right hip: She exhibits decreased range of motion (secondary to pain).        Lumbar back: She exhibits tenderness.   Tenderness over the lumbar spine as well as right hip pain with painful limited range of motion. No bruising or signs of trauma.   Neurological: She is alert and oriented to person, place, and time.   Intact and non-focal neuro  exam.   Skin: Skin is warm and dry. No bruising noted. She is not diaphoretic.   Psychiatric: Her behavior is normal.   Flat affect.   Nursing note and vitals reviewed.      Procedures         ED Course  ED Course as of May 17 0423   Thu May 17, 2018   0248 Radiologic studies showed no acute abnormalities.  CT of the brain without contrast due to positive loss of consciousness and head injury from fall earlier today show no acute intracranial abnormalities.  Urinalysis is completely within normal limits.  Urine hCG is negative.  Awaiting other lab results, including total CK and chemistries.  Patient resting comfortably.  [FC]   0332 Total CK is normal at 82.  2 weeks ago, CK was 710 during hospital admission here at [facility].  Chemistries are within normal limits.  Patient is stable for discharge back to her group home.  Due to mental retardation, patient does not even know the name of her group home.  [FC]   0334 Discharge nurse was advised that patient would not be able to return to her group home until the morning of 5/17/2018.  We will consult  upon their arrival this morning and prepare her for return back to her group home.  Patient denies any suicidal ideations at this time.  She has been pleasant and cooperative throughout the entire ER evaluation.  [FC]   6575 Discussed the case with Dr. Anne.  Since patient has been discharged for less than 24 hours, she needs to be re-admitted so in-patient  can work on discharge disposition back to her group home.  Patient simply had a psychiatric evaluation at [facility] and was cleared by them.  So, that was essentially a consult in the process of the patient's care.   I paged Dr. Box, Hospitalist, to discuss the case.  [FC]   0411 Dr. Anne discussed the case in detail with Dr. Box.  She did not feel that patient needed to be re-admitted, but ultimately she conceded to re-admit patient to med surg so  can work on  discharge disposition.  [FC]      ED Course User Index  [FC] Lety Poole PA-C       Recent Results (from the past 24 hour(s))   Urinalysis With / Culture If Indicated - Urine, Clean Catch    Collection Time: 05/17/18  1:10 AM   Result Value Ref Range    Color, UA Yellow Yellow, Straw    Appearance, UA Clear Clear    pH, UA 8.5 (H) 5.0 - 8.0    Specific Gravity, UA 1.015 1.001 - 1.030    Glucose, UA Negative Negative    Ketones, UA Negative Negative    Bilirubin, UA Negative Negative    Blood, UA Negative Negative    Protein, UA Negative Negative    Leuk Esterase, UA Negative Negative    Nitrite, UA Negative Negative    Urobilinogen, UA 0.2 E.U./dL 0.2 - 1.0 E.U./dL   POCT Pregnancy, Urine    Collection Time: 05/17/18  1:15 AM   Result Value Ref Range    HCG, Urine, QL Negative Negative    Lot Number HCG: [id number]     Internal Positive Control Positive     Internal Negative Control Negative    Comprehensive Metabolic Panel    Collection Time: 05/17/18  2:16 AM   Result Value Ref Range    Glucose 87 70 - 100 mg/dL    BUN 15 9 - 23 mg/dL    Creatinine 0.60 0.60 - 1.30 mg/dL    Sodium 137 132 - 146 mmol/L    Potassium 4.1 3.5 - 5.5 mmol/L    Chloride 99 99 - 109 mmol/L    CO2 30.0 20.0 - 31.0 mmol/L    Calcium 10.1 8.7 - 10.4 mg/dL    Total Protein 7.3 5.7 - 8.2 g/dL    Albumin 4.30 3.20 - 4.80 g/dL    ALT (SGPT) 23 7 - 40 U/L    AST (SGOT) 25 0 - 33 U/L    Alkaline Phosphatase 84 25 - 100 U/L    Total Bilirubin 0.3 0.3 - 1.2 mg/dL    eGFR Non African Amer 121 >60 mL/min/1.73    Globulin 3.0 gm/dL    A/G Ratio 1.4 (L) 1.5 - 2.5 g/dL    BUN/Creatinine Ratio 25.0 7.0 - 25.0    Anion Gap 8.0 3.0 - 11.0 mmol/L   CK    Collection Time: 05/17/18  2:16 AM   Result Value Ref Range    Creatine Kinase 82 26 - 174 U/L   CBC Auto Differential    Collection Time: 05/17/18  2:16 AM   Result Value Ref Range    WBC 9.14 3.50 - 10.80 10*3/mm3    RBC 4.18 3.89 - 5.14 10*6/mm3    Hemoglobin 11.4 (L) 11.5 - 15.5 g/dL    Hematocrit  "35.9 34.5 - 44.0 %    MCV 85.9 80.0 - 99.0 fL    MCH 27.3 27.0 - 31.0 pg    MCHC 31.8 (L) 32.0 - 36.0 g/dL    RDW 16.0 (H) 11.3 - 14.5 %    RDW-SD 50.2 37.0 - 54.0 fl    MPV 9.7 6.0 - 12.0 fL    Platelets 398 150 - 450 10*3/mm3    Neutrophil % 46.7 41.0 - 71.0 %    Lymphocyte % 40.9 24.0 - 44.0 %    Monocyte % 10.8 0.0 - 12.0 %    Eosinophil % 1.3 0.0 - 3.0 %    Basophil % 0.3 0.0 - 1.0 %    Immature Grans % 0.9 (H) 0.0 - 0.6 %    Neutrophils, Absolute 4.26 1.50 - 8.30 10*3/mm3    Lymphocytes, Absolute 3.74 0.60 - 4.80 10*3/mm3    Monocytes, Absolute 0.99 0.00 - 1.00 10*3/mm3    Eosinophils, Absolute 0.12 0.00 - 0.30 10*3/mm3    Basophils, Absolute 0.03 0.00 - 0.20 10*3/mm3    Immature Grans, Absolute 0.08 (H) 0.00 - 0.03 10*3/mm3     Note: In addition to lab results from this visit, the labs listed above may include labs taken at another facility or during a different encounter within the last 24 hours. Please correlate lab times with ED admission and discharge times for further clarification of the services performed during this visit.    XR Hip With or Without Pelvis 2 - 3 View Right   Final Result     No acute abnormality.      THIS DOCUMENT HAS BEEN ELECTRONICALLY SIGNED BY GURWINDER BLACKWOOD MD      XR Spine Lumbar 2 or 3 View   Final Result     No acute abnormality.      THIS DOCUMENT HAS BEEN ELECTRONICALLY SIGNED BY GURWINDER BLACKWOOD MD      CT Head Without Contrast   Final Result     No acute intracranial abnormality.      THIS DOCUMENT HAS BEEN ELECTRONICALLY SIGNED BY GURWINDER BLACKWOOD MD        Vitals:    05/16/18 2243   BP: 126/83   BP Location: Left arm   Patient Position: Sitting   Pulse: 83   Resp: 16   Temp: 98.5 °F (36.9 °C)   TempSrc: Oral   SpO2: 91%   Weight: 105 kg (231 lb 7.7 oz)   Height: 157.5 cm (62\")     Medications - No data to display  ECG/EMG Results (last 24 hours)     ** No results found for the last 24 hours. **                      [facility]    Final diagnoses:   Fall, initial encounter   Contusion of right " hip, initial encounter   History of rhabdomyolysis   Mental retardation   History of posttraumatic stress disorder (PTSD)   History of adjustment disorder       Documentation assistance provided by cinthia Benítez.  Information recorded by the [email or web address] was done at my direction and has been verified and validated by me.     Patel Benítez  05/17/18 0042       Lety Poole PA-C  05/17/18 0414       Lety Poole PA-C  05/17/18 0423

## 2018-05-17 NOTE — CODE DOCUMENTATION
Patient has a known psych history.  She was in a blank stare where she would not respond to any stimulus.  Two RN's from  who have a great relationship with her came over and she instantly snapped out of it.  Patient is waiting for transportation to her group home.

## 2018-05-17 NOTE — DISCHARGE SUMMARY
[facility] Medicine Services  DISCHARGE SUMMARY    Patient Name: Fartun Amin  : 1990  MRN: [id number]    Date of Admission: 2018  /Date of Discharge:  18  Primary Care Physician: ADAM Harkins    Consults     Date and Time Order Name Status Description    2018 0755 Inpatient Gastroenterology Consult Completed     2018 1004 Inpatient Orthopedic Surgery Consult Completed     2018 1349 Inpatient Gastroenterology Consult Completed     2018 0956 Inpatient Neurology Consult Completed         Hospital Course     Presenting Problem:   Fall, initial encounter [W19.XXXA]    Active Hospital Problems (** Indicates Principal Problem)    Diagnosis Date Noted   • Fall [W19.XXXA] 2018      Resolved Hospital Problems    Diagnosis Date Noted Date Resolved   No resolved problems to display.          Hospital Course:  Fartun Amin is a 27 y.o. female female with extensive psychiatric history (moderate MR, adjustment disorder, PTSD, Intermittent Explosive Disorder) who lives in a group home was admitted to [facility] for admission on 18- 18 for inability to walk 2/2 to hip pain after being dismissed from [facility] after psych eval. 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  Orthopedics was consulted and recommended increase mobilization/physical therapy and conservative measures.  She also had noted rhabdomyolysis which has resolved with IV fluid administration.  Patient still has small amount of right hip pain however is improving and fairly well controlled.  She is able to ambulate well with physical therapy and has become more independent during hospitalization.  No recommendations for inpatient rehabilitation for ongoing physical therapy services.      GI was consulted earlier in admission for anemia with possible GI losses.  Patient had recent glass ingestion/suicide attempt prior to admission.  EGD  "was deferred at this time.  She was found to have H. pylori infection and has completed full treatment course.  She will need follow-up H. pylori testing 6/16/2018.  Hemoglobin/hematocrit have remained stable and has not required blood transfusion this admission.    Patient was evaluated by [facility] prior to discharge on 5/16 and recommended further evaluation by [facility]. She is well known to [facility] and upon their assessment, felt she was doing better than previous occasions and baseline. They released her via taxi to [facility] ED as group home was unable to accept her in the evening. She had repeat labs, CT head, xray spine and chest that all were normal last evening. She was admitted due to unknown length of stay in ER while waiting to return to group home. Patient has been cleared medically cleared for discharge as well as cleared by [facility] to return to group home. Patient has been started on zyprexa/ meds slightly adjusted during first admission which she will continue at group home.              Day of Discharge     HPI:   Patient sitting upright in bed. No change in mentation and states she still has some right hip pain, no worse than previous. States she had an \"event\" in taxi last evening when being released from [facility]. Slept ok overnight and no overnight issues per RN.    Review of Systems  Gen- No fevers, chills  CV- No chest pain, palpitations  Resp- No cough, dyspnea  GI- No N/V/D, abd pain      Otherwise ROS is negative except as mentioned in the HPI.    Vital Signs:   Temp:  [98.2 °F (36.8 °C)-98.6 °F (37 °C)] 98.2 °F (36.8 °C)  Heart Rate:  [83-98] 92  Resp:  [14-20] 20  BP: (110-129)/(67-84) 112/67     Physical Exam:  Constitutional: No acute distress, awake, alert  HENT: NCAT, mucous membranes moist  Respiratory: Clear to auscultation bilaterally, respiratory effort normal   Cardiovascular: RRR, no murmurs, rubs, or gallops, palpable pedal pulses " "bilaterally  Gastrointestinal: Positive bowel sounds, soft, nontender, nondistended  Musculoskeletal: No bilateral ankle edema  Psychiatric: flat affect, cooperative  Neurologic: Oriented x 3, strength symmetric in all extremities, Cranial Nerves grossly intact to confrontation, speech clear  Skin: No rashes      Pertinent  and/or Most Recent Results       Results from last 7 days  Lab Units 05/17/18  0216 05/15/18  0441 05/13/18  0938   WBC 10*3/mm3 9.14 6.45 5.57   HEMOGLOBIN g/dL 11.4* 9.9* 9.5*   HEMATOCRIT % 35.9 31.0* 30.2*   PLATELETS 10*3/mm3 398 406 441   SODIUM mmol/L 137 141 140   POTASSIUM mmol/L 4.1 3.9 4.3   CHLORIDE mmol/L 99 106 108   CO2 mmol/L 30.0 27.0 26.0   BUN mg/dL 15 13 11   CREATININE mg/dL 0.60 0.60 0.50*   GLUCOSE mg/dL 87 89 86   CALCIUM mg/dL 10.1 9.0 8.8       Results from last 7 days  Lab Units 05/17/18  0216 05/13/18  0938   BILIRUBIN mg/dL 0.3 0.2*   ALK PHOS U/L 84 74   ALT (SGPT) U/L 23 23   AST (SGOT) U/L 25 21   PROTIME Seconds  --  11.0   INR   --  1.05           Invalid input(s): TG, LDLCALC, LDLREALC      Brief Urine Lab Results  (Last result in the past 365 days)      Color   Clarity   Blood   Leuk Est   Nitrite   Protein   CREAT   Urine HCG        05/17/18 0115               Negative  Comment:  EXP: 2019-12-31           Microbiology Results Abnormal     None          Imaging Results (all)     Procedure Component Value Units Date/Time    XR Hip With or Without Pelvis 2 - 3 View Right [[id number]] Collected:  05/17/18 0000     Updated:  05/17/18 0202    Narrative:       EXAM:    XR Right Hip With Pelvis When Performed, 2 or 3 Views    EXAM DATE/TIME:    5/17/2018 12:00 AM    CLINICAL HISTORY:    27 years old, female; Injury or trauma; Fall; Initial encounter; Sprain or   strain; Right; Hip; Injury date: 05/17/2018; Injury details: Patient states   that \"she had a seizure and fell. \" patient complains of right hip pain;   Additional info: Fall injury    TECHNIQUE:    Two or " "three views of the right hip, with pelvis when performed.    COMPARISON:    CR - XR PELVIS 1 OR 2 VW 2018-04-26 14:49    FINDINGS:    Bones/joints:  Unremarkable.  No acute fracture.  No dislocation.    Soft tissues:  Unremarkable.      Impression:         No acute abnormality.    THIS DOCUMENT HAS BEEN ELECTRONICALLY SIGNED BY GURWINDER BLACKWOOD MD    XR Spine Lumbar 2 or 3 View [[id number]] Collected:  05/17/18 0002     Updated:  05/17/18 0201    Narrative:       EXAM:    XR Lumbar Spine, 2 or 3 Views    EXAM DATE/TIME:    5/17/2018 12:02 AM    CLINICAL HISTORY:    27 years old, female; Injury or trauma; Fall; Initial encounter; Sprain or   strain, lumbar ligaments; Injury date: 05/17/2018; Injury details: Patient   states that \"she had a seizure and fell. \" patient complains of right hip pain;   Additional info: Fall injury    TECHNIQUE:    Frontal and lateral views of the lumbar spine.    COMPARISON:    MRI LUMBAR SPINE WO CONTRAST 2018-04-28 12:17    FINDINGS:    Vertebrae:  Unremarkable.  No acute fracture.  Normal alignment.    Disc spaces:  No acute findings.  No significant narrowing.    Soft tissues:  Unremarkable.      Impression:         No acute abnormality.    THIS DOCUMENT HAS BEEN ELECTRONICALLY SIGNED BY GURWINDER BLACKWOOD MD    CT Head Without Contrast [[id number]] Collected:  05/17/18 0000     Updated:  05/17/18 0132    Narrative:       EXAM:    CT Head Without Intravenous Contrast    EXAM DATE/TIME:    5/17/2018 12:00 AM    CLINICAL HISTORY:    27 years old, female; Pain; Headache; Post-traumatic; Additional info: Fall   with positive loc and head injury    TECHNIQUE:    Axial computed tomography images of the head/brain without intravenous   contrast.  All CT scans at this facility use one or more dose reduction   techniques, viz.: automated exposure control; ma/kV adjustment per patient size   (including targeted exams where dose is matched to indication; i.e. head); or   iterative reconstruction " technique.    COMPARISON:    CT HEAD WO CONTRAST 2018-04-23 18:10    FINDINGS:    Brain:  Unremarkable.  No hemorrhage.  No significant white matter disease.    No edema.    Ventricles:  Unremarkable.  No ventriculomegaly.    Bones/joints:  Unremarkable.  No acute fracture.    Soft tissues:  Unremarkable.    Sinuses:  Unremarkable as visualized.  No acute sinusitis.    Mastoid air cells:  Unremarkable as visualized.  No mastoid effusion.      Impression:         No acute intracranial abnormality.    THIS DOCUMENT HAS BEEN ELECTRONICALLY SIGNED BY GURWINDER BLACKWOOD MD          Results for orders placed during the hospital encounter of 04/23/18   Duplex Venous Lower Extremity - Right CAR    Narrative · No evidence of deep or superficial venous thrombosis in the right lower   extremity.          Results for orders placed during the hospital encounter of 04/23/18   Duplex Venous Lower Extremity - Right CAR    Narrative · No evidence of deep or superficial venous thrombosis in the right lower   extremity.          Results for orders placed during the hospital encounter of 05/01/18   Adult Transthoracic Echo Complete W/ Cont if Necessary Per Protocol    Narrative · Left ventricular systolic function is normal. Estimated EF = 65%.  · Calculated right ventricular systolic pressure from tricuspid   regurgitation is 21 mmHg.            Discharge Details      Fartun Amin   Home Medication Instructions MADELAINE:[id number]    Printed on:05/17/18 1138   Medication Information                      acetaminophen (TYLENOL) 325 MG tablet  Take 650 mg by mouth Every 6 (Six) Hours As Needed for mild pain (1-3).             albuterol (PROVENTIL HFA;VENTOLIN HFA) 108 (90 BASE) MCG/ACT inhaler  Inhale 2 puffs Every 4 (Four) Hours As Needed for wheezing.             atorvastatin (LIPITOR) 20 MG tablet  Take 20 mg by mouth Every Night.             benztropine (COGENTIN) 2 MG tablet  Take 0.5 tablets by mouth 3 (Three) Times a Day.              calcium carbonate (TUMS) 500 MG chewable tablet  Chew 1 tablet As Needed for indigestion or heartburn.             chlorproMAZINE (THORAZINE) 25 MG tablet  Take 1 tablet by mouth 3 (Three) Times a Day.             chlorproMAZINE (THORAZINE) 50 MG tablet  Take 1 tablet by mouth 3 (Three) Times a Day As Needed for Nausea.             divalproex (DEPAKOTE) 500 MG 24 hr tablet  Take 2 tablets by mouth Every Night.             divalproex (DEPAKOTE) 500 MG 24 hr tablet  Take 1 tablet by mouth Daily for 30 days.             EPINEPHrine (EPIPEN 2-RUTHIE) 0.3 MG/0.3ML solution auto-injector injection  As Needed.             escitalopram (LEXAPRO) 10 MG tablet  Take 10 mg by mouth Every Morning.             fluticasone (VERAMYST) 27.5 MCG/SPRAY nasal spray  2 sprays into each nostril Daily.             folic acid (FOLVITE) 1 MG tablet  Take 1 mg by mouth Daily.             GUAIFENESIN PO  Take 600 mg by mouth As Needed.             haloperidol (HALDOL) 5 MG tablet  Take 5 mg by mouth 2 (Two) Times a Day As Needed for Agitation.             hydrocortisone 1 % cream  Apply  topically Every 12 (Twelve) Hours.             LORATADINE PO  Take 10 mg by mouth As Needed.             LORazepam (ATIVAN) 0.5 MG tablet  Take 0.5 mg by mouth Every 8 (Eight) Hours As Needed for anxiety.             montelukast (SINGULAIR) 10 MG tablet  Take 10 mg by mouth Every Night.             neomycin-bacitracin-polymyxin (NEOSPORIN) 5-400-5000 ointment  Apply  topically As Needed.             Norgestimate-Eth Estradiol (SPRINTEC 28 PO)  Take 1 tablet by mouth Daily.             OLANZapine (zyPREXA) 20 MG tablet  Take 1 tablet by mouth Every Night.             ondansetron ODT (ZOFRAN-ODT) 8 MG disintegrating tablet  Take 8 mg by mouth Every 8 (Eight) Hours As Needed for nausea or vomiting.             pantoprazole (PROTONIX) 40 MG EC tablet  Take 40 mg by mouth Daily.             polycarbophil (FIBERCON) 625 MG tablet  Take 1,250 mg by mouth 2 (Two)  Times a Day.             polyethylene glycol (MIRALAX) packet  Take 17 g by mouth 2 (Two) Times a Day.             promethazine (PHENERGAN) 25 MG tablet  Take 25 mg by mouth Every 6 (Six) Hours As Needed for nausea or vomiting.             propranolol (INDERAL) 20 MG tablet  Take 20 mg by mouth 3 (Three) Times a Day.             raNITIdine (ZANTAC) 150 MG tablet  Take 150 mg by mouth Every Night.             traZODone (DESYREL) 50 MG tablet  Take 50 mg by mouth Every Night.                   Discharge Disposition:  Home or Self Care    Discharge Diet:  Diet Instructions     Advance Diet As Tolerated             Discharge Activity:   Activity Instructions     Activity as Tolerated             Special Instructions:Repeat H. Pylori testing 6/16/18    No future appointments.    Additional Instructions for the Follow-ups that You Need to Schedule     Discharge Follow-up with PCP    As directed      Follow Up Details:  1 week               Time Spent on Discharge:  30 minutes    Electronically signed by ADAM West, 05/17/18, 8:49 AM.

## 2018-05-17 NOTE — DISCHARGE INSTRUCTIONS
All radiologic studies showed no acute bony abnormality.  Patient had extensive recent hospitalization here at [facility] for decreased mobility and rhabdomyolysis.  Patient has been ambulating well and PT did not recommend inpatient rehabilitation.  Patient was cleared by [facility] for recent suicidal ideations on 5/16/2018.  She is deemed stable for return back to her group home, where she is a long-term resident.  She needs to continue with all of her current medical management.  All lab work, including urinalysis, were within normal limits during this ER evaluation.  Patient is to return if worsening symptoms

## 2018-05-17 NOTE — PROGRESS NOTES
Continued Stay Note  [facility]     Patient Name: Fartun Amin  MRN: [id number]  Today's Date: 5/17/2018    Admit Date: 5/16/2018          Discharge Plan     Row Name 05/17/18 0810       Plan    Plan Social work was called on call because [facility] was sending Fartun Amin back to the [facility] emergency room instead of going back to her group home where she resides. Social work called her  Brandie, [phone] and she provided the phone numbers for the group home assistant administrators Francy, [phone] or Fariha, [phone]. Social work called [facility] and spoke with the  who said that Fartun Amin was not being admitted to [facility].  Social work explained that the group home was aware that Fartun Amin was being discharged yesterday and the discharge summary was faxed to the group home. The nurse Breanna was aware that [facility] might not accept her yesterday and she would need to go back to the group home.  Social work has called Breanna at the group home this morning and informed her that Ms. Amin can to be picked up by the group home this morning and a discharge summary will be faxed to the group [facility] this morning.    Patient/Family in Agreement with Plan yes              Discharge Codes    No documentation.           LESLY Obregon

## 2018-05-21 ENCOUNTER — APPOINTMENT (OUTPATIENT)
Dept: CT IMAGING | Facility: HOSPITAL | Age: 28
End: 2018-05-21

## 2018-05-21 ENCOUNTER — HOSPITAL ENCOUNTER (INPATIENT)
Facility: HOSPITAL | Age: 28
LOS: 2 days | Discharge: HOME | End: 2018-05-23
Attending: EMERGENCY MEDICINE | Admitting: FAMILY MEDICINE

## 2018-05-21 ENCOUNTER — APPOINTMENT (OUTPATIENT)
Dept: GENERAL RADIOLOGY | Facility: HOSPITAL | Age: 28
End: 2018-05-21

## 2018-05-21 DIAGNOSIS — N17.9 ACUTE RENAL FAILURE, UNSPECIFIED ACUTE RENAL FAILURE TYPE (HCC): ICD-10-CM

## 2018-05-21 DIAGNOSIS — J02.9 PHARYNGITIS, UNSPECIFIED ETIOLOGY: ICD-10-CM

## 2018-05-21 DIAGNOSIS — M25.552 PAIN OF BOTH HIP JOINTS: ICD-10-CM

## 2018-05-21 DIAGNOSIS — R40.0 SOMNOLENCE: Primary | ICD-10-CM

## 2018-05-21 DIAGNOSIS — N30.00 ACUTE CYSTITIS WITHOUT HEMATURIA: ICD-10-CM

## 2018-05-21 DIAGNOSIS — Z74.09 IMPAIRED FUNCTIONAL MOBILITY, BALANCE, GAIT, AND ENDURANCE: ICD-10-CM

## 2018-05-21 DIAGNOSIS — M25.551 PAIN OF BOTH HIP JOINTS: ICD-10-CM

## 2018-05-21 DIAGNOSIS — R09.02 HYPOXIA: ICD-10-CM

## 2018-05-21 DIAGNOSIS — I95.9 HYPOTENSION, UNSPECIFIED HYPOTENSION TYPE: ICD-10-CM

## 2018-05-21 PROBLEM — J36 PERITONSILLAR ABSCESS: Status: ACTIVE | Noted: 2018-05-21

## 2018-05-21 LAB
ALBUMIN SERPL-MCNC: 4.1 G/DL (ref 3.2–4.8)
ALBUMIN/GLOB SERPL: 1.5 G/DL (ref 1.5–2.5)
ALP SERPL-CCNC: 69 U/L (ref 25–100)
ALT SERPL W P-5'-P-CCNC: 20 U/L (ref 7–40)
AMPHET+METHAMPHET UR QL: NEGATIVE
AMPHETAMINES UR QL: NEGATIVE
ANION GAP SERPL CALCULATED.3IONS-SCNC: 12 MMOL/L (ref 3–11)
APAP SERPL-MCNC: <10 MCG/ML (ref 0–30)
ARTERIAL PATENCY WRIST A: ABNORMAL
AST SERPL-CCNC: 43 U/L (ref 0–33)
ATMOSPHERIC PRESS: ABNORMAL MMHG
B-HCG UR QL: NEGATIVE
BACTERIA UR QL AUTO: ABNORMAL /HPF
BARBITURATES UR QL SCN: NEGATIVE
BASE EXCESS BLDA CALC-SCNC: 1.3 MMOL/L (ref 0–2)
BASOPHILS # BLD AUTO: 0.03 10*3/MM3 (ref 0–0.2)
BASOPHILS NFR BLD AUTO: 0.2 % (ref 0–1)
BDY SITE: ABNORMAL
BENZODIAZ UR QL SCN: NEGATIVE
BILIRUB SERPL-MCNC: 0.3 MG/DL (ref 0.3–1.2)
BILIRUB UR QL STRIP: NEGATIVE
BUN BLD-MCNC: 40 MG/DL (ref 9–23)
BUN/CREAT SERPL: 25 (ref 7–25)
BUPRENORPHINE SERPL-MCNC: NEGATIVE NG/ML
CALCIUM SPEC-SCNC: 10.2 MG/DL (ref 8.7–10.4)
CANNABINOIDS SERPL QL: NEGATIVE
CHLORIDE SERPL-SCNC: 104 MMOL/L (ref 99–109)
CLARITY UR: ABNORMAL
CO2 BLDA-SCNC: 27.8 MMOL/L (ref 22–33)
CO2 SERPL-SCNC: 26 MMOL/L (ref 20–31)
COCAINE UR QL: NEGATIVE
COHGB MFR BLD: 0.9 % (ref 0–2)
COLOR UR: ABNORMAL
CREAT BLD-MCNC: 1.6 MG/DL (ref 0.6–1.3)
DEPRECATED RDW RBC AUTO: 51.1 FL (ref 37–54)
EOSINOPHIL # BLD AUTO: 0.04 10*3/MM3 (ref 0–0.3)
EOSINOPHIL NFR BLD AUTO: 0.3 % (ref 0–3)
ERYTHROCYTE [DISTWIDTH] IN BLOOD BY AUTOMATED COUNT: 16.2 % (ref 11.3–14.5)
ETHANOL BLD-MCNC: <10 MG/DL (ref 0–10)
GFR SERPL CREATININE-BSD FRML MDRD: 39 ML/MIN/1.73
GLOBULIN UR ELPH-MCNC: 2.7 GM/DL
GLUCOSE BLD-MCNC: 105 MG/DL (ref 70–100)
GLUCOSE UR STRIP-MCNC: NEGATIVE MG/DL
HCO3 BLDA-SCNC: 26.4 MMOL/L (ref 20–26)
HCT VFR BLD AUTO: 35.4 % (ref 34.5–44)
HCT VFR BLD CALC: 34.9 %
HGB BLD-MCNC: 11.2 G/DL (ref 11.5–15.5)
HGB BLDA-MCNC: 11.4 G/DL (ref 14–18)
HGB UR QL STRIP.AUTO: ABNORMAL
HOROWITZ INDEX BLD+IHG-RTO: 28 %
HYALINE CASTS UR QL AUTO: ABNORMAL /LPF
IMM GRANULOCYTES # BLD: 0.2 10*3/MM3 (ref 0–0.03)
IMM GRANULOCYTES NFR BLD: 1.6 % (ref 0–0.6)
INTERNAL NEGATIVE CONTROL: NEGATIVE
INTERNAL POSITIVE CONTROL: POSITIVE
KETONES UR QL STRIP: NEGATIVE
LEUKOCYTE ESTERASE UR QL STRIP.AUTO: NEGATIVE
LYMPHOCYTES # BLD AUTO: 3.59 10*3/MM3 (ref 0.6–4.8)
LYMPHOCYTES NFR BLD AUTO: 29.5 % (ref 24–44)
Lab: NORMAL
MCH RBC QN AUTO: 27.4 PG (ref 27–31)
MCHC RBC AUTO-ENTMCNC: 31.6 G/DL (ref 32–36)
MCV RBC AUTO: 86.6 FL (ref 80–99)
METHADONE UR QL SCN: NEGATIVE
METHGB BLD QL: 1.1 % (ref 0–1.5)
MODALITY: ABNORMAL
MONOCYTES # BLD AUTO: 1.72 10*3/MM3 (ref 0–1)
MONOCYTES NFR BLD AUTO: 14.1 % (ref 0–12)
NEUTROPHILS # BLD AUTO: 6.81 10*3/MM3 (ref 1.5–8.3)
NEUTROPHILS NFR BLD AUTO: 55.9 % (ref 41–71)
NITRITE UR QL STRIP: NEGATIVE
OPIATES UR QL: NEGATIVE
OXYCODONE UR QL SCN: NEGATIVE
OXYHGB MFR BLDV: 94 % (ref 94–99)
PCO2 BLDA: 43.2 MM HG (ref 35–45)
PCP UR QL SCN: NEGATIVE
PH BLDA: 7.39 PH UNITS (ref 7.35–7.45)
PH UR STRIP.AUTO: <=5 [PH] (ref 5–8)
PLATELET # BLD AUTO: 260 10*3/MM3 (ref 150–450)
PMV BLD AUTO: 9.7 FL (ref 6–12)
PO2 BLDA: 93.7 MM HG (ref 83–108)
POTASSIUM BLD-SCNC: 5.2 MMOL/L (ref 3.5–5.5)
PROPOXYPH UR QL: NEGATIVE
PROT SERPL-MCNC: 6.8 G/DL (ref 5.7–8.2)
PROT UR QL STRIP: ABNORMAL
RBC # BLD AUTO: 4.09 10*6/MM3 (ref 3.89–5.14)
RBC # UR: ABNORMAL /HPF
REF LAB TEST METHOD: ABNORMAL
S PYO AG THROAT QL: NEGATIVE
SALICYLATES SERPL-MCNC: <1 MG/DL (ref 0–29)
SODIUM BLD-SCNC: 142 MMOL/L (ref 132–146)
SP GR UR STRIP: 1.02 (ref 1–1.03)
SQUAMOUS #/AREA URNS HPF: ABNORMAL /HPF
TRICYCLICS UR QL SCN: POSITIVE
UROBILINOGEN UR QL STRIP: ABNORMAL
VALPROATE SERPL-MCNC: 89 MCG/ML (ref 50–150)
WBC NRBC COR # BLD: 12.19 10*3/MM3 (ref 3.5–10.8)
WBC UR QL AUTO: ABNORMAL /HPF

## 2018-05-21 PROCEDURE — 82805 BLOOD GASES W/O2 SATURATION: CPT | Performed by: EMERGENCY MEDICINE

## 2018-05-21 PROCEDURE — 71045 X-RAY EXAM CHEST 1 VIEW: CPT

## 2018-05-21 PROCEDURE — 99222 1ST HOSP IP/OBS MODERATE 55: CPT | Performed by: FAMILY MEDICINE

## 2018-05-21 PROCEDURE — 80164 ASSAY DIPROPYLACETIC ACD TOT: CPT | Performed by: EMERGENCY MEDICINE

## 2018-05-21 PROCEDURE — 99285 EMERGENCY DEPT VISIT HI MDM: CPT

## 2018-05-21 PROCEDURE — 80307 DRUG TEST PRSMV CHEM ANLYZR: CPT | Performed by: EMERGENCY MEDICINE

## 2018-05-21 PROCEDURE — 87880 STREP A ASSAY W/OPTIC: CPT | Performed by: EMERGENCY MEDICINE

## 2018-05-21 PROCEDURE — 81001 URINALYSIS AUTO W/SCOPE: CPT | Performed by: EMERGENCY MEDICINE

## 2018-05-21 PROCEDURE — 25010000002 IOPAMIDOL 61 % SOLUTION: Performed by: INTERNAL MEDICINE

## 2018-05-21 PROCEDURE — 25010000002 DEXAMETHASONE PER 1 MG: Performed by: EMERGENCY MEDICINE

## 2018-05-21 PROCEDURE — 87081 CULTURE SCREEN ONLY: CPT | Performed by: EMERGENCY MEDICINE

## 2018-05-21 PROCEDURE — 72192 CT PELVIS W/O DYE: CPT

## 2018-05-21 PROCEDURE — 93005 ELECTROCARDIOGRAM TRACING: CPT | Performed by: EMERGENCY MEDICINE

## 2018-05-21 PROCEDURE — 85025 COMPLETE CBC W/AUTO DIFF WBC: CPT | Performed by: EMERGENCY MEDICINE

## 2018-05-21 PROCEDURE — 80306 DRUG TEST PRSMV INSTRMNT: CPT | Performed by: EMERGENCY MEDICINE

## 2018-05-21 PROCEDURE — 25010000002 CEFTRIAXONE PER 250 MG: Performed by: EMERGENCY MEDICINE

## 2018-05-21 PROCEDURE — 36600 WITHDRAWAL OF ARTERIAL BLOOD: CPT | Performed by: EMERGENCY MEDICINE

## 2018-05-21 PROCEDURE — 70450 CT HEAD/BRAIN W/O DYE: CPT

## 2018-05-21 PROCEDURE — 80053 COMPREHEN METABOLIC PANEL: CPT | Performed by: EMERGENCY MEDICINE

## 2018-05-21 PROCEDURE — 70491 CT SOFT TISSUE NECK W/DYE: CPT

## 2018-05-21 PROCEDURE — 70490 CT SOFT TISSUE NECK W/O DYE: CPT

## 2018-05-21 RX ORDER — CEFTRIAXONE SODIUM 1 G/50ML
1 INJECTION, SOLUTION INTRAVENOUS ONCE
Status: COMPLETED | OUTPATIENT
Start: 2018-05-21 | End: 2018-05-21

## 2018-05-21 RX ORDER — SODIUM CHLORIDE 0.9 % (FLUSH) 0.9 %
1-10 SYRINGE (ML) INJECTION AS NEEDED
Status: DISCONTINUED | OUTPATIENT
Start: 2018-05-21 | End: 2018-05-23 | Stop reason: HOSPADM

## 2018-05-21 RX ORDER — CLINDAMYCIN PHOSPHATE 600 MG/50ML
600 INJECTION INTRAVENOUS EVERY 6 HOURS
Status: DISCONTINUED | OUTPATIENT
Start: 2018-05-21 | End: 2018-05-21 | Stop reason: SDUPTHER

## 2018-05-21 RX ORDER — ONDANSETRON 2 MG/ML
4 INJECTION INTRAMUSCULAR; INTRAVENOUS EVERY 6 HOURS PRN
Status: DISCONTINUED | OUTPATIENT
Start: 2018-05-21 | End: 2018-05-23 | Stop reason: HOSPADM

## 2018-05-21 RX ORDER — DEXAMETHASONE SODIUM PHOSPHATE 4 MG/ML
8 INJECTION, SOLUTION INTRA-ARTICULAR; INTRALESIONAL; INTRAMUSCULAR; INTRAVENOUS; SOFT TISSUE ONCE
Status: COMPLETED | OUTPATIENT
Start: 2018-05-21 | End: 2018-05-21

## 2018-05-21 RX ORDER — CLINDAMYCIN PHOSPHATE 600 MG/50ML
600 INJECTION INTRAVENOUS EVERY 6 HOURS
Status: DISCONTINUED | OUTPATIENT
Start: 2018-05-22 | End: 2018-05-23

## 2018-05-21 RX ORDER — CLINDAMYCIN PHOSPHATE 600 MG/50ML
600 INJECTION INTRAVENOUS ONCE
Status: COMPLETED | OUTPATIENT
Start: 2018-05-21 | End: 2018-05-21

## 2018-05-21 RX ORDER — SODIUM CHLORIDE 0.9 % (FLUSH) 0.9 %
10 SYRINGE (ML) INJECTION AS NEEDED
Status: DISCONTINUED | OUTPATIENT
Start: 2018-05-21 | End: 2018-05-23 | Stop reason: HOSPADM

## 2018-05-21 RX ORDER — SODIUM CHLORIDE 9 MG/ML
125 INJECTION, SOLUTION INTRAVENOUS CONTINUOUS
Status: DISCONTINUED | OUTPATIENT
Start: 2018-05-21 | End: 2018-05-23

## 2018-05-21 RX ORDER — SODIUM CHLORIDE 9 MG/ML
125 INJECTION, SOLUTION INTRAVENOUS CONTINUOUS
Status: ACTIVE | OUTPATIENT
Start: 2018-05-21 | End: 2018-05-22

## 2018-05-21 RX ORDER — NYSTATIN 100000 [USP'U]/G
POWDER TOPICAL EVERY 12 HOURS SCHEDULED
Status: DISCONTINUED | OUTPATIENT
Start: 2018-05-21 | End: 2018-05-23 | Stop reason: HOSPADM

## 2018-05-21 RX ORDER — ACETAMINOPHEN 325 MG/1
650 TABLET ORAL EVERY 4 HOURS PRN
Status: DISCONTINUED | OUTPATIENT
Start: 2018-05-21 | End: 2018-05-23 | Stop reason: HOSPADM

## 2018-05-21 RX ADMIN — DEXAMETHASONE SODIUM PHOSPHATE 8 MG: 4 INJECTION, SOLUTION INTRAMUSCULAR; INTRAVENOUS at 15:42

## 2018-05-21 RX ADMIN — SODIUM CHLORIDE 125 ML/HR: 9 INJECTION, SOLUTION INTRAVENOUS at 17:03

## 2018-05-21 RX ADMIN — SODIUM CHLORIDE 2000 ML: 9 INJECTION, SOLUTION INTRAVENOUS at 15:40

## 2018-05-21 RX ADMIN — NYSTATIN: 100000 POWDER TOPICAL at 23:18

## 2018-05-21 RX ADMIN — CEFTRIAXONE SODIUM 1 G: 1 INJECTION, SOLUTION INTRAVENOUS at 18:38

## 2018-05-21 RX ADMIN — IOPAMIDOL 75 ML: 612 INJECTION, SOLUTION INTRAVENOUS at 18:11

## 2018-05-21 RX ADMIN — SODIUM CHLORIDE 125 ML/HR: 9 INJECTION, SOLUTION INTRAVENOUS at 18:40

## 2018-05-21 RX ADMIN — CLINDAMYCIN PHOSPHATE 600 MG: 12 INJECTION, SOLUTION INTRAVENOUS at 17:04

## 2018-05-21 RX ADMIN — CLINDAMYCIN PHOSPHATE 600 MG: 12 INJECTION, SOLUTION INTRAVENOUS at 23:36

## 2018-05-21 RX ADMIN — SODIUM CHLORIDE 500 ML: 9 INJECTION, SOLUTION INTRAVENOUS at 17:03

## 2018-05-21 NOTE — PROGRESS NOTES
Continued Stay Note   [facility]     Patient Name: Fartun Amin  MRN: [id number]  Today's Date: 5/21/2018    Admit Date: 5/21/2018          Discharge Plan     Row Name 05/21/18 1400       Plan    Plan [facility]ARRICK follow-up    Patient/Family in Agreement with Plan yes    Plan Comments SW visited pt. in ED bed 21 today at approximately 1300.  Pt. was quite drowsy and did not wake to speak to SW.  [facility] [place] ([facility] staff person was present and reported that she would remain present with pt. in the ER until pt. is discharged from the ER.  Staff person stated she would provide pt's transportation back to the group home.  She stated she was unsure why there were reports that pt's throat was sore as she was unaware of this complaint.  If pt. is admitted to the hospital, staff person will leave.                Discharge Codes    No documentation.           LESLY Queen
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
## 2018-05-21 NOTE — H&P
[facility] Medicine Services  HISTORY AND PHYSICAL    Patient Name: Fartun Amin  : 1990  MRN: [id number]  Primary Care Physician: ADAM Harkins    Subjective   Subjective     Chief Complaint:  Sore throat    HPI:  Fartun Amin is a 27 y.o. female with extensive psychiatric history (moderate MR, adjustment disorder, PTSD, Intermittent Explosive Disorder) who lives in a group home was admitted to [facility] for admission on 18- 18 for inability to walk 2/2 to hip pain after being dismissed from [facility] after psych eval. 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  Orthopedics was consulted and recommended increase mobilization/physical therapy and conservative measures.  She also had noted rhabdomyolysis which has resolved with IV fluid administration.     Today her group home called EMS because they could not get her up off the floor.  When EMS arrived they were able to get her up and brought her to the hospital.  She is quite sleepy due to being sedated for transport.  However she does complain of a sore throat.  She screams out in pain.  Group home did not report any fever or chills to the nursing staff.  Fartun is an unreliable historian and not very cooperative at this time.    In emergency department she had a CT scan of her neck that revealed a possible peritonsillar abscess.  Currently CT scan of her neck with contrast is pending.  She was started on clindamycin.  She was noted to have acute kidney injury with creatinine of 1.6.    Review of Systems   Unable to obtain due to mentation.    Personal History     Past Medical History:   Diagnosis Date   • Abnormal drug screen 2018   • ADHD (attention deficit hyperactivity disorder)    • Adjustment disorder    • LORE (acute kidney injury) 2018   • Altered mental status 2018   • Asthma    • Cognitive impairment    • GERD (gastroesophageal reflux disease)     • Hearing impairment    • HTN (hypertension) 4/23/2018   • Hyperkalemia 4/23/2018   • Hypertension    • Intermittent explosive disorder    • Mood disorder    • MR (mental retardation), moderate    • Obesity    • PCOS (polycystic ovarian syndrome)    • Personality disorder    • PTSD (post-traumatic stress disorder)    • QT prolongation 4/23/2018   • Sepsis 4/23/2018   • Suicide attempt by drug ingestion 4/23/2018   • UTI (urinary tract infection) 4/23/2018       History reviewed. No pertinent surgical history.    Family History: Family history is unknown by patient.     Social History:  reports that she has never smoked. She has never used smokeless tobacco. She reports that she does not drink alcohol or use drugs.  Social History     Social History Narrative    Lives in a group home. Has a state guardian.       Medications:    (Not in a hospital admission)    Allergies   Allergen Reactions   • Milk-Related Compounds Swelling     Of throat   • Penicillins Unknown (See Comments)     Pt does not know   • Sulfa Antibiotics Unknown (See Comments)     Pt does not know       Objective   Objective     Vital Signs:   Temp:  [99 °F (37.2 °C)] 99 °F (37.2 °C)  Heart Rate:  [88-97] 88  Resp:  [14] 14  BP: ()/(59-70) 104/59        Physical Exam   Constitutional: No acute distress, asleep,  Eyes: PERRLA, sclerae anicteric, no conjunctival injection  HENT: NCAT, mouth is very dry, poor dentition, she attempts to open her mouth however nothing besides her tongue can be visualized.  She will not cooperate to open her mouth further or tolerate tongue depressor.  This makes exam very limited.  She does express pain when swallowing.  Neck: Complains of tenderness in her left tonsillar node.  Left tonsillar node is swollen and mobile.  Respiratory: Clear to auscultation bilaterally, nonlabored respirations   Cardiovascular: RRR, no murmurs, rubs, or gallops, palpable pedal pulses bilaterally  Gastrointestinal: Positive bowel  sounds, soft, nontender, nondistended  Musculoskeletal: No bilateral ankle edema, no clubbing or cyanosis to extremities  Psychiatric: Flat affect, minimally cooperative  Neurologic: Moves all extremities with no focal deficit noted, oriented to self otherwise confused to details.  Asks where she is.  Screams when I tell her she's at the hospital  Skin: No rashes    Results Reviewed:  I have personally reviewed current lab, radiology, and data and agree.      Results from last 7 days  Lab Units 05/21/18  1339   WBC 10*3/mm3 12.19*   HEMOGLOBIN g/dL 11.2*   HEMATOCRIT % 35.4   PLATELETS 10*3/mm3 260       Results from last 7 days  Lab Units 05/21/18  1339   SODIUM mmol/L 142   POTASSIUM mmol/L 5.2   CHLORIDE mmol/L 104   CO2 mmol/L 26.0   BUN mg/dL 40*   CREATININE mg/dL 1.60*   GLUCOSE mg/dL 105*   CALCIUM mg/dL 10.2   ALT (SGPT) U/L 20   AST (SGOT) U/L 43*     Estimated Creatinine Clearance: 66.1 mL/min (A) (by C-G formula based on SCr of 1.6 mg/dL (H)).  Brief Urine Lab Results  (Last result in the past 365 days)      Color   Clarity   Blood   Leuk Est   Nitrite   Protein   CREAT   Urine HCG        05/21/18 1602               Negative         No results found for: BNP  pH, Arterial   Date Value Ref Range Status   05/21/2018 7.395 7.350 - 7.450 pH units Final     Imaging Results (last 24 hours)     Procedure Component Value Units Date/Time    CT Head Without Contrast [[id number]] Updated:  05/21/18 1754    XR Chest 1 View [[id number]] Collected:  05/21/18 1341     Updated:  05/21/18 1440    Narrative:       EXAMINATION: XR CHEST 1 VW-05/21/2018:      INDICATION: AMS.      COMPARISON: 05/01/2018.     FINDINGS: Portable chest reveals low lung volumes. The heart is  borderline enlarged. No focal parenchymal opacification present.  No  pleural effusion or pneumothorax. The bony structures are unremarkable.  Pulmonary vascularity is within normal limits.       Impression:       Low lung volumes with no acute parenchymal  disease.     D:  05/21/2018  E:  05/21/2018     This report was finalized on 5/21/2018 2:38 PM by Dr. Janelle Paulson MD.       CT Soft Tissue Neck Without Contrast [[id number]] Collected:  05/21/18 1051     Updated:  05/21/18 1437    Narrative:       EXAMINATION: CT SOFT TISSUE NECK WO CONTRAST-      INDICATION: Poor historian; sore throat.     TECHNIQUE: Multiple axial CT imaging was obtained of the soft tissue  neck without the administration of intravenous contrast.     The radiation dose reduction device was turned on for each scan per the  ALARA (As Low as Reasonably Achievable) protocol.     COMPARISON: None.     FINDINGS: There is asymmetry identified in the tonsillar pillars with  prominence identified on the left. Given lack of intravenous contrast  findings may suggest swelling, however, a left peritonsillar abscess  cannot be excluded. This area measures approximately 3.3 x 2.3 cm.   Direct visualization is recommended. The thyroid is homogeneous. The  airways is patent. No bulky adenopathy. Degenerative change is seen  within the spine. The lung apices are grossly clear.       Impression:       Prominence of the left peritonsillar region suggesting  either an area of swelling, however, an abscess cannot be excluded given  lack of intravenous contrast on the examination. Direct visualization is  recommended.     D:  05/21/2018  E:  05/21/2018     This report was finalized on 5/21/2018 2:35 PM by Dr. Janelle Paulson MD.             Assessment/Plan   Assessment / Plan     Hospital Problem List     * (Principal)Peritonsillar abscess    LORE (acute kidney injury)    HTN (hypertension) (Chronic)    Adjustment disorder (Chronic)    Personality disorder (Chronic)    PTSD (post-traumatic stress disorder) (Chronic)    Intermittent explosive disorder (Chronic)    GERD (gastroesophageal reflux disease) (Chronic)    Asthma (Chronic)            Assessment & Plan:  --repeat CT neck with contrast to further  evaluate abscess is pending  --for now she is not showing any signs of airway comprimise, will continue IV Clindamycin  --LORE likely due to poor oral intake, will give IVF and repeat morning labs, hold any nephrotoxic medications at this time  --ENT consult for the AM  --restart home medications    DVT prophylaxis: hold AC until evalutated by ENT just in case drainage is required, TEDS/SCDS for now if she will wear them    CODE STATUS:  Full Code    Admission Status:  I believe this patient meets OBSERVATION status, however if further evaluation or treatment plans warrant, status may change.  Based upon current information, I predict patient's care encounter to be less than or equal to 2 midnights.    Electronically signed by ADAM Torres, 05/21/18, 6:03 PM.      Brief Attending Admission Attestation     I have seen and examined the patient, performing an independent face-to-face diagnostic evaluation with plan of care reviewed and developed with the advanced practice clinician (APC).      Brief Summary Statement/HPI:   Fartun Amin is a 27 y.o. female with h/o PTSD, moderate MR, adjustment disorder and personality d/c who has been in and out of [facility] and [facility] several times in the past few months. Pt with c/o sore throat. Started suddenly. Staff had reports pt had not complain of a sore throat until after arriving at the ED. She currently resides in a group home/ she was violent prior to transport to the ED and required sedation in route. Pt provides limited response to questions over then stating her throat hurt. pt has hx of swallowing foreign objects including recently swallowing glass and even earrings. Admit for IV clinda and consult ENT. Consider sitter when pt is more alert.     CT of neck without contrast noted prominence of the left peritonsillar region suggesting swelling. Awaiting results of repeat CT with contrast.  Pt also with recent increase on thoazine dose from 75 mg TID to 400 mg TID.  For now we will decrease thorazine to 200 mg TID.   Attending Physical Exam:  Vitals:    05/21/18 2328   BP: 133/90   Pulse: 95   Resp: 16   Temp: 97.6 °F (36.4 °C)   SpO2:      Gen-NAD, obese   HEENT-atraumatic, normocephalic, PERRLA, EOMI, moist mucous membranes   CV-RRR, No Murmur  Resp-CTAB, no rales, no rhonchi, no wheezing  Abd-normal BS, soft, ND, NT   Ext-no edema or clubbing, pedal pulses intact  Skin-warm, dry, no rashes or lesions noted  Neuro-A&Ox3, CN II-XII grossly intact, equal strength in upper and lower extremities  Psych-sleeping, would no      Brief Assessment/Plan :  Principal Problem:    Peritonsillar abscess  Active Problems:    LORE (acute kidney injury)    HTN (hypertension)    Adjustment disorder    Personality disorder    PTSD (post-traumatic stress disorder)    Intermittent explosive disorder    GERD (gastroesophageal reflux disease)    Asthma    Somnolence      See above for further detailed assessment and plan developed with APC which I have reviewed and/or edited.      Electronically signed by Sharon Ramirez DO, 05/22/18, 3:16 AM.

## 2018-05-22 PROBLEM — R40.0 SOMNOLENCE: Status: ACTIVE | Noted: 2018-05-22

## 2018-05-22 LAB
ANION GAP SERPL CALCULATED.3IONS-SCNC: 10 MMOL/L (ref 3–11)
BASOPHILS # BLD AUTO: 0.02 10*3/MM3 (ref 0–0.2)
BASOPHILS NFR BLD AUTO: 0.2 % (ref 0–1)
BUN BLD-MCNC: 16 MG/DL (ref 9–23)
BUN/CREAT SERPL: 26.7 (ref 7–25)
CALCIUM SPEC-SCNC: 9.4 MG/DL (ref 8.7–10.4)
CHLORIDE SERPL-SCNC: 108 MMOL/L (ref 99–109)
CO2 SERPL-SCNC: 28 MMOL/L (ref 20–31)
CREAT BLD-MCNC: 0.6 MG/DL (ref 0.6–1.3)
DEPRECATED RDW RBC AUTO: 50.3 FL (ref 37–54)
EOSINOPHIL # BLD AUTO: 0.02 10*3/MM3 (ref 0–0.3)
EOSINOPHIL NFR BLD AUTO: 0.2 % (ref 0–3)
ERYTHROCYTE [DISTWIDTH] IN BLOOD BY AUTOMATED COUNT: 15.6 % (ref 11.3–14.5)
GFR SERPL CREATININE-BSD FRML MDRD: 121 ML/MIN/1.73
GLUCOSE BLD-MCNC: 101 MG/DL (ref 70–100)
HCT VFR BLD AUTO: 33.2 % (ref 34.5–44)
HGB BLD-MCNC: 10.4 G/DL (ref 11.5–15.5)
IMM GRANULOCYTES # BLD: 0.07 10*3/MM3 (ref 0–0.03)
IMM GRANULOCYTES NFR BLD: 0.6 % (ref 0–0.6)
LYMPHOCYTES # BLD AUTO: 3.45 10*3/MM3 (ref 0.6–4.8)
LYMPHOCYTES NFR BLD AUTO: 28.7 % (ref 24–44)
MCH RBC QN AUTO: 27.3 PG (ref 27–31)
MCHC RBC AUTO-ENTMCNC: 31.3 G/DL (ref 32–36)
MCV RBC AUTO: 87.1 FL (ref 80–99)
MONOCYTES # BLD AUTO: 1.25 10*3/MM3 (ref 0–1)
MONOCYTES NFR BLD AUTO: 10.4 % (ref 0–12)
NEUTROPHILS # BLD AUTO: 7.2 10*3/MM3 (ref 1.5–8.3)
NEUTROPHILS NFR BLD AUTO: 59.9 % (ref 41–71)
PLATELET # BLD AUTO: 225 10*3/MM3 (ref 150–450)
PMV BLD AUTO: 9.4 FL (ref 6–12)
POTASSIUM BLD-SCNC: 3.3 MMOL/L (ref 3.5–5.5)
RBC # BLD AUTO: 3.81 10*6/MM3 (ref 3.89–5.14)
SODIUM BLD-SCNC: 146 MMOL/L (ref 132–146)
WBC NRBC COR # BLD: 12.01 10*3/MM3 (ref 3.5–10.8)

## 2018-05-22 PROCEDURE — 99233 SBSQ HOSP IP/OBS HIGH 50: CPT | Performed by: HOSPITALIST

## 2018-05-22 PROCEDURE — 85025 COMPLETE CBC W/AUTO DIFF WBC: CPT | Performed by: NURSE PRACTITIONER

## 2018-05-22 PROCEDURE — 80048 BASIC METABOLIC PNL TOTAL CA: CPT | Performed by: NURSE PRACTITIONER

## 2018-05-22 RX ORDER — PANTOPRAZOLE SODIUM 40 MG/1
40 TABLET, DELAYED RELEASE ORAL DAILY
Status: DISCONTINUED | OUTPATIENT
Start: 2018-05-22 | End: 2018-05-23 | Stop reason: HOSPADM

## 2018-05-22 RX ORDER — TRAZODONE HYDROCHLORIDE 50 MG/1
50 TABLET ORAL NIGHTLY
Status: DISCONTINUED | OUTPATIENT
Start: 2018-05-22 | End: 2018-05-23 | Stop reason: HOSPADM

## 2018-05-22 RX ORDER — IBUPROFEN 600 MG/1
600 TABLET ORAL 3 TIMES DAILY
COMMUNITY

## 2018-05-22 RX ORDER — CHLORPROMAZINE HYDROCHLORIDE 50 MG/1
200 TABLET, FILM COATED ORAL 3 TIMES DAILY
Status: DISCONTINUED | OUTPATIENT
Start: 2018-05-22 | End: 2018-05-22

## 2018-05-22 RX ORDER — CETIRIZINE HYDROCHLORIDE 10 MG/1
10 TABLET ORAL DAILY
Status: DISCONTINUED | OUTPATIENT
Start: 2018-05-22 | End: 2018-05-23 | Stop reason: HOSPADM

## 2018-05-22 RX ORDER — PROPRANOLOL HYDROCHLORIDE 20 MG/1
20 TABLET ORAL 3 TIMES DAILY
Status: DISCONTINUED | OUTPATIENT
Start: 2018-05-22 | End: 2018-05-23 | Stop reason: HOSPADM

## 2018-05-22 RX ORDER — LISINOPRIL 10 MG/1
10 TABLET ORAL DAILY
COMMUNITY
End: 2018-10-05 | Stop reason: HOSPADM

## 2018-05-22 RX ORDER — CLONAZEPAM 0.5 MG/1
0.5 TABLET ORAL 3 TIMES DAILY PRN
COMMUNITY

## 2018-05-22 RX ORDER — NORGESTIMATE AND ETHINYL ESTRADIOL 0.25-0.035
1 KIT ORAL DAILY
Status: DISCONTINUED | OUTPATIENT
Start: 2018-05-22 | End: 2018-05-23 | Stop reason: HOSPADM

## 2018-05-22 RX ORDER — OLANZAPINE 5 MG/1
20 TABLET ORAL NIGHTLY
Status: DISCONTINUED | OUTPATIENT
Start: 2018-05-22 | End: 2018-05-23 | Stop reason: HOSPADM

## 2018-05-22 RX ORDER — MONTELUKAST SODIUM 10 MG/1
10 TABLET ORAL NIGHTLY
Status: DISCONTINUED | OUTPATIENT
Start: 2018-05-22 | End: 2018-05-23 | Stop reason: HOSPADM

## 2018-05-22 RX ORDER — CHLORPROMAZINE HYDROCHLORIDE 100 MG/1
200 TABLET, FILM COATED ORAL 3 TIMES DAILY
COMMUNITY

## 2018-05-22 RX ORDER — ESCITALOPRAM OXALATE 10 MG/1
10 TABLET ORAL
Status: DISCONTINUED | OUTPATIENT
Start: 2018-05-22 | End: 2018-05-23 | Stop reason: HOSPADM

## 2018-05-22 RX ORDER — FOLIC ACID 1 MG/1
1 TABLET ORAL DAILY
Status: DISCONTINUED | OUTPATIENT
Start: 2018-05-22 | End: 2018-05-23 | Stop reason: HOSPADM

## 2018-05-22 RX ORDER — FLUTICASONE PROPIONATE 50 MCG
2 SPRAY, SUSPENSION (ML) NASAL DAILY
Status: DISCONTINUED | OUTPATIENT
Start: 2018-05-22 | End: 2018-05-23 | Stop reason: HOSPADM

## 2018-05-22 RX ORDER — HALOPERIDOL 5 MG/1
5 TABLET ORAL 2 TIMES DAILY PRN
Status: DISCONTINUED | OUTPATIENT
Start: 2018-05-22 | End: 2018-05-23 | Stop reason: HOSPADM

## 2018-05-22 RX ORDER — NALTREXONE HYDROCHLORIDE 50 MG/1
50 TABLET, FILM COATED ORAL DAILY
COMMUNITY

## 2018-05-22 RX ORDER — NALTREXONE HYDROCHLORIDE 50 MG/1
50 TABLET, FILM COATED ORAL DAILY
Status: DISCONTINUED | OUTPATIENT
Start: 2018-05-22 | End: 2018-05-23 | Stop reason: HOSPADM

## 2018-05-22 RX ORDER — CALCIUM POLYCARBOPHIL 625 MG
1250 TABLET ORAL 2 TIMES DAILY
Status: DISCONTINUED | OUTPATIENT
Start: 2018-05-22 | End: 2018-05-23 | Stop reason: HOSPADM

## 2018-05-22 RX ORDER — LISINOPRIL 10 MG/1
10 TABLET ORAL DAILY
Status: DISCONTINUED | OUTPATIENT
Start: 2018-05-22 | End: 2018-05-23 | Stop reason: HOSPADM

## 2018-05-22 RX ADMIN — CLINDAMYCIN PHOSPHATE 600 MG: 12 INJECTION, SOLUTION INTRAVENOUS at 17:49

## 2018-05-22 RX ADMIN — CLINDAMYCIN PHOSPHATE 600 MG: 12 INJECTION, SOLUTION INTRAVENOUS at 05:34

## 2018-05-22 RX ADMIN — TRAZODONE HYDROCHLORIDE 50 MG: 50 TABLET ORAL at 21:21

## 2018-05-22 RX ADMIN — FLUTICASONE PROPIONATE 2 SPRAY: 50 SPRAY, METERED NASAL at 11:31

## 2018-05-22 RX ADMIN — NORGESTIMATE AND ETHINYL ESTRADIOL 1 TABLET: KIT at 17:48

## 2018-05-22 RX ADMIN — CHLORPROMAZINE HYDROCHLORIDE 200 MG: 50 TABLET, SUGAR COATED ORAL at 11:33

## 2018-05-22 RX ADMIN — POLYETHYLENE GLYCOL (3350) 17 G: 17 POWDER, FOR SOLUTION ORAL at 11:39

## 2018-05-22 RX ADMIN — PANTOPRAZOLE SODIUM 40 MG: 40 TABLET, DELAYED RELEASE ORAL at 11:38

## 2018-05-22 RX ADMIN — CLINDAMYCIN PHOSPHATE 600 MG: 12 INJECTION, SOLUTION INTRAVENOUS at 11:29

## 2018-05-22 RX ADMIN — CETIRIZINE HYDROCHLORIDE 10 MG: 10 TABLET, FILM COATED ORAL at 11:39

## 2018-05-22 RX ADMIN — FOLIC ACID 1 MG: 1 TABLET ORAL at 11:39

## 2018-05-22 RX ADMIN — ACETAMINOPHEN 650 MG: 325 TABLET, FILM COATED ORAL at 08:09

## 2018-05-22 RX ADMIN — ESCITALOPRAM OXALATE 10 MG: 10 TABLET ORAL at 11:32

## 2018-05-22 RX ADMIN — MONTELUKAST SODIUM 10 MG: 10 TABLET, FILM COATED ORAL at 21:21

## 2018-05-22 RX ADMIN — NYSTATIN: 100000 POWDER TOPICAL at 08:09

## 2018-05-22 RX ADMIN — POLYETHYLENE GLYCOL (3350) 17 G: 17 POWDER, FOR SOLUTION ORAL at 21:22

## 2018-05-22 RX ADMIN — HALOPERIDOL 5 MG: 5 TABLET ORAL at 22:45

## 2018-05-22 RX ADMIN — CALCIUM POLYCARBOPHIL 1250 MG: 625 TABLET, FILM COATED ORAL at 21:21

## 2018-05-22 RX ADMIN — PROPRANOLOL HYDROCHLORIDE 20 MG: 20 TABLET ORAL at 17:50

## 2018-05-22 RX ADMIN — NYSTATIN: 100000 POWDER TOPICAL at 21:22

## 2018-05-22 RX ADMIN — NALTREXONE HYDROCHLORIDE 50 MG: 50 TABLET, FILM COATED ORAL at 11:39

## 2018-05-22 RX ADMIN — CALCIUM POLYCARBOPHIL 1250 MG: 625 TABLET, FILM COATED ORAL at 11:46

## 2018-05-22 RX ADMIN — OLANZAPINE 20 MG: 5 TABLET, FILM COATED ORAL at 21:21

## 2018-05-22 RX ADMIN — PROPRANOLOL HYDROCHLORIDE 20 MG: 20 TABLET ORAL at 21:21

## 2018-05-22 RX ADMIN — SODIUM CHLORIDE 125 ML/HR: 9 INJECTION, SOLUTION INTRAVENOUS at 11:32

## 2018-05-22 NOTE — PROGRESS NOTES
"Continued Stay Note  [facility]     Patient Name: Fartun Amin  MRN: [id number]  Today's Date: 5/22/2018    Admit Date: 5/21/2018          Discharge Plan     Row Name 05/22/18 1443       Plan    Plan Update     Plan Comments Spoke with [facility] and patient able to return there once medically ready and independent with adl's. Patient was feeding herself, taking her own showers, 7 steps to get in the home and independent with ambulation. PT/OT referral made. Trista at [phone]     Row Name 05/22/18 1409       Plan    Plan Update     Plan Comments Rec'd call from Lauren @ [phone] with [facility]. She follows patient in the group home too. She confirms patient likes being in an hospital/institution type setting. Lauren has looked far and wide for an inpatient long term psych facility and unable to find this even outside of [place]. Made Lauren aware patient's frequent admissions and really the only option for patient is to return to the group home. Lauren wants to be contacted at time of discharge. Trista @ [phone]     Row Name 05/22/18 1213       Plan    Plan Group Home/[facility]    Patient/Family in Agreement with Plan yes    Plan Comments Spoke with patient's cathy Self Jacobs @ [phone] and she is aware of this hospital admit. Clair is aware that patient has been admiited here several times over the last month or so. Clair states, \" Patient likes being in the hospital.\" Clair's plan is return back to the group home, [facility] @ [phone]. Called group home and left a message with Fariha to discuss discharge once medically ready. Trista @ [phone]               Discharge Codes    No documentation.       Expected Discharge Date and Time     Expected Discharge Date Expected Discharge Time    May 28, 2018             Beth Smith RN    "

## 2018-05-22 NOTE — CONSULTS
Referring Provider:    Reason for Consultation: possible peritonsillar abscess    Patient Care Team:  ADAM Harkins as PCP - General (Family Medicine)    Chief complaint patient somnolent and history unobtainable.    Subjective .     History of present illness:     Entire history obtained from the chart as the patient is not conversant.  Admitted with somnolence and sore throat.  Lives in a group home.  CT scan performed which showed a possible peritonsillar abscess.  Initially the scan was performed without contrast and then one was repeated with contrast.  Has been on clindamycin.  Review of Systems  Review of systems could not be obtained due to   patient nonverbal.    History  Past Medical History:   Diagnosis Date   • Abnormal drug screen 4/23/2018   • ADHD (attention deficit hyperactivity disorder)    • Adjustment disorder    • LORE (acute kidney injury) 4/23/2018   • Altered mental status 4/23/2018   • Asthma    • Cognitive impairment    • GERD (gastroesophageal reflux disease)    • Hearing impairment    • HTN (hypertension) 4/23/2018   • Hyperkalemia 4/23/2018   • Hypertension    • Intermittent explosive disorder    • Mood disorder    • MR (mental retardation), moderate    • Obesity    • PCOS (polycystic ovarian syndrome)    • Peritonsillar abscess 5/21/2018   • Personality disorder    • PTSD (post-traumatic stress disorder)    • QT prolongation 4/23/2018   • Sepsis 4/23/2018   • Suicide attempt by drug ingestion 4/23/2018   • UTI (urinary tract infection) 4/23/2018   , History reviewed. No pertinent surgical history.,   Family History   Problem Relation Age of Onset   • Family history unknown: Yes   ,   Social History   Substance Use Topics   • Smoking status: Never Smoker   • Smokeless tobacco: Never Used   • Alcohol use No   ,   Prescriptions Prior to Admission   Medication Sig Dispense Refill Last Dose   • acetaminophen (TYLENOL) 325 MG tablet Take 650 mg by mouth Every 6 (Six) Hours As Needed for  mild pain (1-3).   5/21/2018   • albuterol (PROVENTIL HFA;VENTOLIN HFA) 108 (90 BASE) MCG/ACT inhaler Inhale 2 puffs Every 4 (Four) Hours As Needed for wheezing.   5/21/2018   • atorvastatin (LIPITOR) 20 MG tablet Take 20 mg by mouth Every Night.   5/21/2018   • benztropine (COGENTIN) 2 MG tablet Take 0.5 tablets by mouth 3 (Three) Times a Day. (Patient taking differently: Take 2 mg by mouth 2 (Two) Times a Day.) 90 tablet 0 5/21/2018   • calcium carbonate (TUMS) 500 MG chewable tablet Chew 1 tablet As Needed for indigestion or heartburn.   5/21/2018   • chlorproMAZINE (THORAZINE) 100 MG tablet Take 400 mg by mouth 3 (Three) Times a Day.      • chlorproMAZINE (THORAZINE) 25 MG tablet Take 1 tablet by mouth 3 (Three) Times a Day. 90 tablet 0 5/21/2018   • chlorproMAZINE (THORAZINE) 50 MG tablet Take 1 tablet by mouth 3 (Three) Times a Day As Needed for Nausea. 30 tablet 0 5/21/2018   • clonazePAM (KlonoPIN) 0.5 MG tablet Take 0.5 mg by mouth 2 (Two) Times a Day.      • divalproex (DEPAKOTE) 500 MG 24 hr tablet Take 2 tablets by mouth Every Night. (Patient taking differently: Take 1,500 mg by mouth Every Night.) 60 tablet 0 5/21/2018   • divalproex (DEPAKOTE) 500 MG 24 hr tablet Take 1 tablet by mouth Daily for 30 days. (Patient taking differently: Take 250 mg by mouth Daily.) 30 tablet 0 5/21/2018   • EPINEPHrine (EPIPEN 2-RUTHIE) 0.3 MG/0.3ML solution auto-injector injection As Needed.   5/21/2018   • escitalopram (LEXAPRO) 10 MG tablet Take 10 mg by mouth Every Morning.   5/21/2018   • fluticasone (VERAMYST) 27.5 MCG/SPRAY nasal spray 2 sprays into each nostril Daily.   5/21/2018   • folic acid (FOLVITE) 1 MG tablet Take 1 mg by mouth Daily.   5/21/2018   • GUAIFENESIN PO Take 600 mg by mouth As Needed.   5/21/2018   • haloperidol (HALDOL) 5 MG tablet Take 5 mg by mouth 2 (Two) Times a Day As Needed for Agitation.   5/21/2018   • hydrocortisone 1 % cream Apply  topically Every 12 (Twelve) Hours. 14 g 0 5/21/2018   •  ibuprofen (ADVIL,MOTRIN) 600 MG tablet Take 600 mg by mouth 3 (Three) Times a Day.      • lisinopril (PRINIVIL,ZESTRIL) 10 MG tablet Take 10 mg by mouth Daily.      • LORATADINE PO Take 10 mg by mouth As Needed.   5/21/2018   • LORazepam (ATIVAN) 0.5 MG tablet Take 0.5 mg by mouth Every 8 (Eight) Hours As Needed for anxiety.   5/21/2018   • montelukast (SINGULAIR) 10 MG tablet Take 10 mg by mouth Every Night.   5/21/2018   • naltrexone (DEPADE) 50 MG tablet Take 50 mg by mouth Daily.      • neomycin-bacitracin-polymyxin (NEOSPORIN) 5-400-5000 ointment Apply  topically As Needed.   5/21/2018   • Norgestimate-Eth Estradiol (SPRINTEC 28 PO) Take 1 tablet by mouth Daily.   5/21/2018   • OLANZapine (zyPREXA) 20 MG tablet Take 1 tablet by mouth Every Night. 30 tablet 0 5/21/2018   • ondansetron ODT (ZOFRAN-ODT) 8 MG disintegrating tablet Take 8 mg by mouth Every 8 (Eight) Hours As Needed for nausea or vomiting.   5/21/2018   • pantoprazole (PROTONIX) 40 MG EC tablet Take 40 mg by mouth Daily.   5/21/2018   • polycarbophil (FIBERCON) 625 MG tablet Take 1,250 mg by mouth 2 (Two) Times a Day.   5/21/2018   • polyethylene glycol (MIRALAX) packet Take 17 g by mouth 2 (Two) Times a Day. 225 g 0 5/21/2018   • promethazine (PHENERGAN) 25 MG tablet Take 25 mg by mouth Every 6 (Six) Hours As Needed for nausea or vomiting.   5/21/2018   • propranolol (INDERAL) 20 MG tablet Take 20 mg by mouth 3 (Three) Times a Day.   5/21/2018   • raNITIdine (ZANTAC) 150 MG tablet Take 150 mg by mouth Every Night.   5/21/2018   • traZODone (DESYREL) 50 MG tablet Take 50 mg by mouth Every Night.   5/21/2018   , Scheduled Meds:    cetirizine 10 mg Oral Daily   clindamycin 600 mg Intravenous Q6H   escitalopram 10 mg Oral Q AM   fluticasone 2 spray Each Nare Daily   folic acid 1 mg Oral Daily   lisinopril 10 mg Oral Daily   montelukast 10 mg Oral Nightly   naltrexone 50 mg Oral Daily   norgestimate-ethinyl estradiol 1 tablet Oral Daily   nystatin   Topical Q12H   OLANZapine 20 mg Oral Nightly   pantoprazole 40 mg Oral Daily   polycarbophil 1,250 mg Oral BID   polyethylene glycol 17 g Oral BID   propranolol 20 mg Oral TID   traZODone 50 mg Oral Nightly    and Allergies:  Milk-related compounds; Penicillins; and Sulfa antibiotics    Objective     Vital Signs   Temp:  [97.2 °F (36.2 °C)-98.2 °F (36.8 °C)] 97.6 °F (36.4 °C)  Heart Rate:  [74-95] 74  Resp:  [16-18] 18  BP: ()/(74-95) 101/75    Physical Exam:    Patient in no distress.  Sleeping.  Will not respond very well to commands.    Ear exam normal.  Normal tympanic membranes and ear canals.    Nose is normal.  Septum midline.  Oral cavity oropharynx exam somewhat difficult because of limited cooperation with opening her mouth.  I was able to see the uvula and there is no deviation.  No peritonsillar erythema.  No exudate  Neck is nontender with no lymphadenopathy  Voice unobtainable  Cranial nerves unable to be assessed  Thyroid is nonpalpable  Salivary glands normal palpation    Results Review:  I reviewed the actual CT images from the CT of the neck with contrast and without contrast.  There is some peritonsillar and tonsillar swelling on the left side.  No fluid collection appears to be present      Assessment/Plan     Throat pain.  I reviewed the CT scan.  I see no evidence of the peritonsillar abscess and cannot appreciated clinical findings of a PTA. Likely has a peritonsillar cellulitis. I recommend continuing current antibiotic therapy. No indication for surgical drainage or tonsillectomy            Stanislaw Crump MD  05/22/18  5:38 PM

## 2018-05-22 NOTE — PLAN OF CARE
Problem: Skin Injury Risk (Adult)  Goal: Identify Related Risk Factors and Signs and Symptoms  Outcome: Ongoing (interventions implemented as appropriate)   05/22/18 0958   Skin Injury Risk (Adult)   Related Risk Factors (Skin Injury Risk) cognitive impairment;moisture;mobility impaired     Goal: Skin Health and Integrity  Outcome: Ongoing (interventions implemented as appropriate)   05/22/18 0958   Skin Injury Risk (Adult)   Skin Health and Integrity making progress toward outcome       Problem: Patient Care Overview  Goal: Plan of Care Review  Outcome: Ongoing (interventions implemented as appropriate)   05/22/18 0958   Coping/Psychosocial   Plan of Care Reviewed With patient  (POC update with GRISEL Tyler and GRISEL Roberts)   Plan of Care Review   Progress no change   OTHER   Outcome Summary Patient consult for yeast noted in bilateral groin folds and under both breasts-recommend alternating InterDry with Nystatin powder. Area on right breast/nipple appears to be bruising-may use moisturizer in this area. Patient on waffle mattress-see skin care orders. WOC will continue to follow due to low Hernandez and at risk patient history. Please notify for any questions or new concerns.

## 2018-05-22 NOTE — PLAN OF CARE
Problem: Fall Risk (Adult)  Goal: Identify Related Risk Factors and Signs and Symptoms  Outcome: Ongoing (interventions implemented as appropriate)   05/22/18 0323   Fall Risk (Adult)   Related Risk Factors (Fall Risk) confusion/agitation;culprit medication(s);fatigue/slow reaction;gait/mobility problems;history of falls;impaired vision;polypharmacy;environment unfamiliar   Signs and Symptoms (Fall Risk) presence of risk factors     Goal: Absence of Fall  Outcome: Ongoing (interventions implemented as appropriate)      Problem: Skin Injury Risk (Adult)  Goal: Identify Related Risk Factors and Signs and Symptoms  Outcome: Ongoing (interventions implemented as appropriate)    Goal: Skin Health and Integrity  Outcome: Ongoing (interventions implemented as appropriate)      Problem: Pain, Acute (Adult)  Goal: Identify Related Risk Factors and Signs and Symptoms  Outcome: Ongoing (interventions implemented as appropriate)    Goal: Acceptable Pain Control/Comfort Level  Outcome: Ongoing (interventions implemented as appropriate)

## 2018-05-22 NOTE — PROGRESS NOTES
"Continued Stay Note  [facility]     Patient Name: Fartun Amin  MRN: [id number]  Today's Date: 5/22/2018    Admit Date: 5/21/2018          Discharge Plan     Row Name 05/22/18 1213       Plan    Plan Group Home/[facility]    Patient/Family in Agreement with Plan yes    Plan Comments Spoke with patient's guardain Clair Jacobs @ [phone] and she is aware of this hospital admit. Clair is aware that patient has been admiited here several times over the last month or so. Clari states, \" Patient likes being in the hospital.\" Clair's plan for patient is  return back to the group home, [facility] @ [phone]. Called group home and left a message with Fariha to discuss discharge needs  once medically ready. Trista @ [phone]     Row Name 05/22/18 0959       Plan    Plan Undecided     Plan Comments Complex Care following for discharge planning needs, contact Beth at [phone] or Noreen @ [phone]. Trista               Discharge Codes    No documentation.       Expected Discharge Date and Time     Expected Discharge Date Expected Discharge Time    May 28, 2018             Beth Smith RN    "

## 2018-05-22 NOTE — PROGRESS NOTES
"Continued Stay Note  [facility]     Patient Name: Fartun Amin  MRN: [id number]  Today's Date: 5/22/2018    Admit Date: 5/21/2018          Discharge Plan     Row Name 05/22/18 1404       Plan    Plan Update     Plan Comments Rec'd call from Lauren @ [phone] with [employer]. She follows patient in the group home too. She confirms patient likes being in an hospital/institutional type setting. Lauren/[email or web address].or  has looked far and wide for an inpatient long term psych facility and unable to find this even outside of [place]. Made Laurne aware patient's frequent admissions and really the only option for patient is to return to the group home. Lauren wants to be contacted at time of discharge. Trista @ [phone]     Row Name 05/22/18 1215       Plan    Plan Group Home/[facility]    Patient/Family in Agreement with Plan yes    Plan Comments Spoke with patient's cathy Self Jacobs @ [phone] and she is aware of this hospital admit. Clair is aware that patient has been admiited here several times over the last month or so. Clair states, \" Patient likes being in the hospital.\" Clair's plan is return back to the group home, [facility] @ [phone]. Called group home and left a message with Fariha to discuss discharge once medically ready. Trista @ [phone]               Discharge Codes    No documentation.       Expected Discharge Date and Time     Expected Discharge Date Expected Discharge Time    May 28, 2018             Beth Smith RN    "

## 2018-05-22 NOTE — PROGRESS NOTES
[facility] Medicine Services  PROGRESS NOTE    Patient Name: Fartun Amin  : 1990  MRN: [id number]    Date of Admission: 2018  Length of Stay: 1  Primary Care Physician: ADAM Harkins    Subjective   Subjective     CC:  F/U poss peritonsillar abscess    HPI:  Unobtainable    Review of Systems  Unable to obtain    Objective   Objective     Vital Signs:   Temp:  [97.2 °F (36.2 °C)-98.2 °F (36.8 °C)] 97.8 °F (36.6 °C)  Heart Rate:  [74-95] 74  Resp:  [16-18] 18  BP: ()/(59-95) 104/75        Physical Exam:  Limited exam-    General Assessment: No acute cardiopulmonary distress. Well developed and well nourished.    HEENT: NCAT    Neck: Supple,  No palpable mass/fluctuance at neck, no LAD    CVS: RRR, S1S2 normal, no murmurs    Resp: CTAB, no adventitious sound    Abd: soft, NT, ND, normal BS, no guarding or peritoneal signs    Ext: No edema, both calves are symmetric and NTTP    Neuro: Somnolent, unable to arouse    Skin: W/D/I. No rash.      Results Reviewed:  I have personally reviewed current lab, radiology, and data and agree.      Results from last 7 days  Lab Units 18  0821 18  1339 18  0216   WBC 10*3/mm3 12.01* 12.19* 9.14   HEMOGLOBIN g/dL 10.4* 11.2* 11.4*   HEMATOCRIT % 33.2* 35.4 35.9   PLATELETS 10*3/mm3 225 260 398       Results from last 7 days  Lab Units 18  0821 18  1339 18  0216   SODIUM mmol/L 146 142 137   POTASSIUM mmol/L 3.3* 5.2 4.1   CHLORIDE mmol/L 108 104 99   CO2 mmol/L 28.0 26.0 30.0   BUN mg/dL 16 40* 15   CREATININE mg/dL 0.60 1.60* 0.60   GLUCOSE mg/dL 101* 105* 87   CALCIUM mg/dL 9.4 10.2 10.1   ALT (SGPT) U/L  --  20 23   AST (SGOT) U/L  --  43* 25     Estimated Creatinine Clearance: 170.9 mL/min (by C-G formula based on SCr of 0.6 mg/dL).  No results found for: BNP  pH, Arterial   Date Value Ref Range Status   2018 7.395 7.350 - 7.450 pH units Final       Microbiology Results Abnormal      Procedure Component Value - Date/Time    Beta Strep Culture, Throat - Swab, Throat [[id number]]  (Normal) Collected:  05/21/18 0908    Lab Status:  Preliminary result Specimen:  Swab from Throat Updated:  05/22/18 0716     Throat Culture, Beta Strep No Beta Hemolytic Streptococcus Isolated    Rapid Strep A Screen - Swab, Throat [[id number]]  (Normal) Collected:  05/21/18 0908    Lab Status:  Final result Specimen:  Swab from Throat Updated:  05/21/18 0927     Strep A Ag Negative    Narrative:         Test performed by Direct Antigen Testing.          Imaging Results (last 24 hours)     Procedure Component Value Units Date/Time    CT Soft Tissue Neck With Contrast [[id number]] Collected:  05/22/18 0921     Updated:  05/22/18 1002    Narrative:       EXAMINATION: CT SOFT TISSUE NECK W CONTRAST-05/21/2018:      INDICATION: Evaluate for abscess; R40.0-Somnolence; R09.02-Hypoxemia;  I95.9-Hypotension, unspecified; N17.9-Acute kidney failure, unspecified;  M25.551-Pain in right hip; M25.552-Pain in left hip; J02.9-Acute  pharyngitis, unspecified; N30.00-Acute cystitis without hematuria, sore  throat.     TECHNIQUE: Multiple axial CT imaging was obtained of the soft tissue of  the neck following the administration of intravenous contrast.     The radiation dose reduction device was turned on for each scan per the  ALARA (As Low as Reasonably Achievable) protocol.     COMPARISON: NONE.     FINDINGS: The sinuses are grossly clear. There is asymmetry again seen  within the tonsillar pillars prominent on the left. There is some low  density seen in the prevertebral soft tissues in which a small abscess  cannot be excluded. There is streak artifact identified from the dental  hardware obscuring image detail. Tonsillar abscess on the left cannot be  excluded. Direct visualization is recommended. There is no significant  adenopathy with several borderline enlarged lymph nodes seen in the  jugulodigastric region bilaterally. The  salivary glands are  unremarkable. Degenerative changes seen within the spine.       Impression:       Asymmetry identified in the peritonsillar regions, left  greater than right, with some low density seen in the peritonsillar  region on the left extending into the prevertebral soft tissues. Abscess  cannot be excluded and clinical correlation is needed. Direct  visualization is recommended.     D:  05/21/2018  E:  05/22/2018           This report was finalized on 5/22/2018 10:00 AM by Dr. Janelle Pualson MD.       CT Head Without Contrast [[id number]] Collected:  05/22/18 0919     Updated:  05/22/18 1002    Narrative:       EXAMINATION: CT HEAD WO CONTRAST-      INDICATION: AMS; R40.0-Somnolence; R09.02-Hypoxemia; I95.9-Hypotension,  unspecified; N17.9-Acute kidney failure, unspecified; M25.551-Pain in  right hip; M25.552-Pain in left hip; J02.9-Acute pharyngitis,  unspecified; N30.00-Acute cystitis without hematuria.     TECHNIQUE: Multiple axial CT imaging was obtained of the head from the  skull base to the skull vertex without the administration of intravenous  contrast.     The radiation dose reduction device was turned on for each scan per the  ALARA (As Low as Reasonably Achievable) protocol.     COMPARISON: None.     FINDINGS: Parenchyma is unremarkable in appearance. No hemorrhage or  hydrocephalus. No mass, mass effect, or midline shift. No abnormal  extraaxial fluid collection is identified. The bony structures reveal no  evidence of osseous abnormality. Visualized paranasal sinuses are clear.  The mastoid air cells are patent.       Impression:       No acute intracranial abnormality.     D:  05/21/2018  E:  05/22/2018        This report was finalized on 5/22/2018 9:59 AM by Dr. Janelle Paulson MD.       CT Pelvis Without Contrast [[id number]] Collected:  05/22/18 0921     Updated:  05/22/18 1002    Narrative:       EXAMINATION: CT PELVIS WO CONTRAST-      INDICATION: R40.0-Somnolence;  R09.02-Hypoxemia; I95.9-Hypotension,  unspecified; N17.9-Acute kidney failure, unspecified; M25.551-Pain in  right hip; M25.552-Pain in left hip; J02.9-Acute pharyngitis,  unspecified; N30.00-Acute cystitis without hematuria.     TECHNIQUE: Multiple axial CT imaging was obtained of the pelvis without  the administration of intravenous contrast.     The radiation dose reduction device was turned on for each scan per the  ALARA (As Low as Reasonably Achievable) protocol.     COMPARISON: NONE     FINDINGS: The pelvic portion of the gastrointestinal tract is within  normal limits. The appendix is radiographically normal in appearance. No  abdominal or retroperitoneal lymphadenopathy. No free fluid or free air.  The pelvic organs are unremarkable. No abnormal mass or fluid collection  is identified. The bony structures reveal no evidence of osseous  abnormality. Visualized bony structures are unremarkable. Minimal  degenerative change is seen within the posterior facets of the spine.  Both hips are within normal limits.       Impression:       No acute intrapelvic abnormality, bony structures are  unremarkable.     D:  05/21/2018  E:  05/22/2018        This report was finalized on 5/22/2018 9:59 AM by Dr. Janelle Paulson MD.           Results for orders placed during the hospital encounter of 05/01/18   Adult Transthoracic Echo Complete W/ Cont if Necessary Per Protocol    Narrative · Left ventricular systolic function is normal. Estimated EF = 65%.  · Calculated right ventricular systolic pressure from tricuspid   regurgitation is 21 mmHg.          I have reviewed the medications.    Assessment/Plan   Assessment / Plan     Hospital Problem List     * (Principal)Peritonsillar abscess    LORE (acute kidney injury)    HTN (hypertension) (Chronic)    Adjustment disorder (Chronic)    Personality disorder (Chronic)    PTSD (post-traumatic stress disorder) (Chronic)    Intermittent explosive disorder (Chronic)    GERD  (gastroesophageal reflux disease) (Chronic)    Asthma (Chronic)    Somnolence             Brief Hospital Course to date:  Fartun Amin is a 27 y.o. female, normally lives in a group home, has h/o PTSD, moderate MR, adjustment and personality d/o, who has been in and out of [facility] and [facility] several times in the past few months. Recently admitted here 5/1-5/16 due to inability to walk, diagnosed with hip myositis from prolonged immobilazation. She was brought back to the ED today because staff couldn't get her off the floor. While in ED, she c/o sore throat. CT neck showed poss peritonsillar abscess. Of not, pt was very somnolent due to sedative; apparently very agitated/combative.      Assessment & Plan:  - Still very somnolent, likely from medications. Her Thorazine was recently increased from 75mg TID to 400mg TID per report. Earlier pt was alert per nurse. She did receive Thorazine 200mg earlier. I will hold med Thorazine for now.    - Awaiting ENT eval. Repeat CT neck with IV contrast noted, abscess not excluded yet. Pt has mild leukocytosis. Afebrile. Cont abx for now.    - CT head and Hip neg for acute pathology      CODE STATUS: Full Code    Disposition: TBD      Electronically signed by Lashonda Wilson MD, 05/22/18, 3:33 PM.

## 2018-05-22 NOTE — PROGRESS NOTES
"Discharge Planning Assessment  [facility]     Patient Name: Fartun Amin  MRN: [id number]  Today's Date: 5/22/2018    Admit Date: 5/21/2018          Discharge Needs Assessment     Row Name 05/22/18 1208       Living Environment    Lives With --   Patient resides in a group home [facility] @ [phone]    Name(s) of Who Lives With Patient Group Home [facility] @ [phone]    Current Living Arrangements group home    Primary Care Provided by --   Group Home     Provides Primary Care For no one, unable/limited ability to care for self    Family Caregiver if Needed --   Group Home @ [phone]       Resource/Environmental Concerns    Transportation Concerns car, none       Transition Planning    Patient/Family Anticipates Transition to --   Group Home     Patient/Family Anticipated Services at Transition mental health services   group home    Transportation Anticipated agency       Discharge Needs Assessment    Concerns to be Addressed mental health    Equipment Currently Used at Home wheelchair    Anticipated Changes Related to Illness inability to care for self    Equipment Needed After Discharge wheelchair, manual    Outpatient/Agency/Support Group Needs group home    Discharge Facility/Level of Care Needs --   Group Home [facility]    Current Discharge Risk psychiatric illness;cognitively impaired            Discharge Plan     Row Name 05/22/18 1213       Plan    Plan Group Home/[facility]    Patient/Family in Agreement with Plan yes    Plan Comments Spoke with patient's guardain Clair Jacobs @ [phone] and she is aware of this hospital admit. Clair is aware that patient has been admiited here several times over the last month or so. Clair states, \" Patient likes being in the hospital.\" Clair's plan is return back to the group home, [facility] @ [phone]. Called group home and left a message with Fariha to discuss discharge once medically ready. Trista @ [phone]     Row Name 05/22/18 8567       Plan    Plan Undecided     Plan Comments " Complex Care following for discharge planning needs, contact Beth at [phone] or Noreen @ [phone]. Trista         Destination     No service coordination in this encounter.      Durable Medical Equipment     No service coordination in this encounter.      Dialysis/Infusion     No service coordination in this encounter.      Home Medical Care     No service coordination in this encounter.      Social Care     No service coordination in this encounter.        Expected Discharge Date and Time     Expected Discharge Date Expected Discharge Time    May 28, 2018               Demographic Summary     Row Name 05/22/18 1207       General Information    Admission Type inpatient    Arrived From --   group home to ED     Referral Source admission list    Reason for Consult discharge planning    Preferred Language English    General Information Comments Per guardian Clair plan to transfer back to group home once medically ready.        Contact Information    Permission Granted to Share Info With ;guardian    Contact Information Obtained for ;guardian       Guardian Information    Name, Emilee Jacobs @ [phone]    Phone, Guardian [phone]            Functional Status     Row Name 05/22/18 1208       Functional Status    Usual Activity Tolerance fair    Current Activity Tolerance fair            Psychosocial    No documentation.           Abuse/Neglect    No documentation.           Legal    No documentation.           Substance Abuse    No documentation.           Patient Forms    No documentation.         Beth Smith, GRISEL

## 2018-05-22 NOTE — PLAN OF CARE
"Problem: Patient Care Overview  Goal: Plan of Care Review  Outcome: Ongoing (interventions implemented as appropriate)   05/22/18 2323   Coping/Psychosocial   Plan of Care Reviewed With patient   Plan of Care Review   Progress no change   OTHER   Outcome Summary Pt drowsy this shift; will awaken and state \"my throat hurts\". Otherwise, no acute overnight events         "

## 2018-05-22 NOTE — PLAN OF CARE
Problem: Fall Risk (Adult)  Goal: Absence of Fall  Outcome: Ongoing (interventions implemented as appropriate)      Problem: Pain, Acute (Adult)  Goal: Acceptable Pain Control/Comfort Level  Outcome: Ongoing (interventions implemented as appropriate)      Problem: Patient Care Overview  Goal: Individualization and Mutuality  Outcome: Ongoing (interventions implemented as appropriate)    Goal: Discharge Needs Assessment  Outcome: Ongoing (interventions implemented as appropriate)    Goal: Interprofessional Rounds/Family Conf  Outcome: Ongoing (interventions implemented as appropriate)    Goal: Plan of Care Review  Outcome: Ongoing (interventions implemented as appropriate)    Goal: Individualization and Mutuality  Outcome: Ongoing (interventions implemented as appropriate)    Goal: Discharge Needs Assessment  Outcome: Ongoing (interventions implemented as appropriate)    Goal: Interprofessional Rounds/Family Conf  Outcome: Ongoing (interventions implemented as appropriate)

## 2018-05-22 NOTE — PROGRESS NOTES
Continued Stay Note  [facility]     Patient Name: Fartun Amin  MRN: [id number]  Today's Date: 5/22/2018    Admit Date: 5/21/2018          Discharge Plan     Row Name 05/22/18 0959       Plan    Plan Undecided     Plan Comments Complex Care following for discharge planning needs, contact Beth at [phone] or Noreen @ [phone]. Trista               Discharge Codes    No documentation.       Expected Discharge Date and Time     Expected Discharge Date Expected Discharge Time    May 28, 2018             Beth Smith RN

## 2018-05-23 VITALS
DIASTOLIC BLOOD PRESSURE: 80 MMHG | WEIGHT: 231.26 LBS | OXYGEN SATURATION: 93 % | RESPIRATION RATE: 16 BRPM | HEIGHT: 65 IN | SYSTOLIC BLOOD PRESSURE: 113 MMHG | BODY MASS INDEX: 38.53 KG/M2 | TEMPERATURE: 97.7 F | HEART RATE: 78 BPM

## 2018-05-23 PROBLEM — B37.89 CANDIDA RASH OF GROIN: Status: ACTIVE | Noted: 2018-05-23

## 2018-05-23 PROBLEM — R40.0 SOMNOLENCE: Status: RESOLVED | Noted: 2018-05-22 | Resolved: 2018-05-23

## 2018-05-23 PROBLEM — J03.90 TONSILLITIS: Status: ACTIVE | Noted: 2018-05-21

## 2018-05-23 PROBLEM — N17.9 AKI (ACUTE KIDNEY INJURY) (HCC): Status: RESOLVED | Noted: 2018-04-23 | Resolved: 2018-05-23

## 2018-05-23 LAB
ANION GAP SERPL CALCULATED.3IONS-SCNC: 7 MMOL/L (ref 3–11)
BACTERIA SPEC AEROBE CULT: NORMAL
BASOPHILS # BLD AUTO: 0.04 10*3/MM3 (ref 0–0.2)
BASOPHILS NFR BLD AUTO: 0.4 % (ref 0–1)
BUN BLD-MCNC: 11 MG/DL (ref 9–23)
BUN/CREAT SERPL: 18.3 (ref 7–25)
CALCIUM SPEC-SCNC: 9 MG/DL (ref 8.7–10.4)
CHLORIDE SERPL-SCNC: 103 MMOL/L (ref 99–109)
CO2 SERPL-SCNC: 31 MMOL/L (ref 20–31)
CREAT BLD-MCNC: 0.6 MG/DL (ref 0.6–1.3)
DEPRECATED RDW RBC AUTO: 49.5 FL (ref 37–54)
EOSINOPHIL # BLD AUTO: 0.1 10*3/MM3 (ref 0–0.3)
EOSINOPHIL NFR BLD AUTO: 1.1 % (ref 0–3)
ERYTHROCYTE [DISTWIDTH] IN BLOOD BY AUTOMATED COUNT: 15.6 % (ref 11.3–14.5)
GFR SERPL CREATININE-BSD FRML MDRD: 121 ML/MIN/1.73
GLUCOSE BLD-MCNC: 84 MG/DL (ref 70–100)
HCT VFR BLD AUTO: 30.8 % (ref 34.5–44)
HGB BLD-MCNC: 9.8 G/DL (ref 11.5–15.5)
IMM GRANULOCYTES # BLD: 0.08 10*3/MM3 (ref 0–0.03)
IMM GRANULOCYTES NFR BLD: 0.8 % (ref 0–0.6)
LYMPHOCYTES # BLD AUTO: 4.51 10*3/MM3 (ref 0.6–4.8)
LYMPHOCYTES NFR BLD AUTO: 47.6 % (ref 24–44)
MCH RBC QN AUTO: 27.4 PG (ref 27–31)
MCHC RBC AUTO-ENTMCNC: 31.8 G/DL (ref 32–36)
MCV RBC AUTO: 86 FL (ref 80–99)
MONOCYTES # BLD AUTO: 0.89 10*3/MM3 (ref 0–1)
MONOCYTES NFR BLD AUTO: 9.4 % (ref 0–12)
NEUTROPHILS # BLD AUTO: 3.94 10*3/MM3 (ref 1.5–8.3)
NEUTROPHILS NFR BLD AUTO: 41.5 % (ref 41–71)
PLATELET # BLD AUTO: 193 10*3/MM3 (ref 150–450)
PMV BLD AUTO: 9.7 FL (ref 6–12)
POTASSIUM BLD-SCNC: 3.4 MMOL/L (ref 3.5–5.5)
RBC # BLD AUTO: 3.58 10*6/MM3 (ref 3.89–5.14)
SODIUM BLD-SCNC: 141 MMOL/L (ref 132–146)
WBC NRBC COR # BLD: 9.48 10*3/MM3 (ref 3.5–10.8)

## 2018-05-23 PROCEDURE — 97161 PT EVAL LOW COMPLEX 20 MIN: CPT

## 2018-05-23 PROCEDURE — 85025 COMPLETE CBC W/AUTO DIFF WBC: CPT | Performed by: HOSPITALIST

## 2018-05-23 PROCEDURE — 80048 BASIC METABOLIC PNL TOTAL CA: CPT | Performed by: HOSPITALIST

## 2018-05-23 PROCEDURE — 99239 HOSP IP/OBS DSCHRG MGMT >30: CPT | Performed by: FAMILY MEDICINE

## 2018-05-23 PROCEDURE — 97116 GAIT TRAINING THERAPY: CPT

## 2018-05-23 RX ORDER — SACCHAROMYCES BOULARDII 250 MG
250 CAPSULE ORAL 2 TIMES DAILY
Status: DISCONTINUED | OUTPATIENT
Start: 2018-05-23 | End: 2018-05-23 | Stop reason: HOSPADM

## 2018-05-23 RX ORDER — POLYETHYLENE GLYCOL 3350 17 G/17G
17 POWDER, FOR SOLUTION ORAL DAILY
Qty: 30 EACH | Refills: 0 | Status: SHIPPED | OUTPATIENT
Start: 2018-05-23

## 2018-05-23 RX ORDER — POTASSIUM CHLORIDE 1.5 G/1.77G
40 POWDER, FOR SOLUTION ORAL AS NEEDED
Status: DISCONTINUED | OUTPATIENT
Start: 2018-05-23 | End: 2018-05-23 | Stop reason: HOSPADM

## 2018-05-23 RX ORDER — LORAZEPAM 1 MG/1
1 TABLET ORAL EVERY 6 HOURS PRN
COMMUNITY

## 2018-05-23 RX ORDER — BENZTROPINE MESYLATE 2 MG/1
2 TABLET ORAL 2 TIMES DAILY
Qty: 60 TABLET | Refills: 1 | Status: SHIPPED | OUTPATIENT
Start: 2018-05-23

## 2018-05-23 RX ORDER — TRIAMCINOLONE ACETONIDE 0.25 MG/G
1 CREAM TOPICAL 2 TIMES DAILY PRN
COMMUNITY

## 2018-05-23 RX ORDER — ECHINACEA PURPUREA EXTRACT 125 MG
2 TABLET ORAL 2 TIMES DAILY
COMMUNITY

## 2018-05-23 RX ORDER — ONDANSETRON 4 MG/1
4 TABLET, FILM COATED ORAL EVERY 6 HOURS PRN
COMMUNITY

## 2018-05-23 RX ORDER — CLINDAMYCIN HYDROCHLORIDE 150 MG/1
300 CAPSULE ORAL EVERY 8 HOURS SCHEDULED
Status: DISCONTINUED | OUTPATIENT
Start: 2018-05-23 | End: 2018-05-23 | Stop reason: HOSPADM

## 2018-05-23 RX ORDER — DIVALPROEX SODIUM 250 MG/1
250 TABLET, DELAYED RELEASE ORAL EVERY MORNING
COMMUNITY

## 2018-05-23 RX ORDER — IBUPROFEN 800 MG/1
800 TABLET ORAL EVERY 8 HOURS PRN
COMMUNITY
End: 2018-10-05 | Stop reason: HOSPADM

## 2018-05-23 RX ORDER — DIVALPROEX SODIUM 500 MG/1
1500 TABLET, EXTENDED RELEASE ORAL NIGHTLY
Qty: 90 TABLET | Refills: 0 | Status: SHIPPED | OUTPATIENT
Start: 2018-05-23

## 2018-05-23 RX ORDER — SACCHAROMYCES BOULARDII 250 MG
250 CAPSULE ORAL 2 TIMES DAILY
Qty: 19 CAPSULE | Refills: 0 | Status: SHIPPED | OUTPATIENT
Start: 2018-05-23 | End: 2018-06-02

## 2018-05-23 RX ORDER — NYSTATIN 100000 [USP'U]/G
POWDER TOPICAL 3 TIMES DAILY
Qty: 30 G | Refills: 1 | Status: SHIPPED | OUTPATIENT
Start: 2018-05-23 | End: 2018-06-06

## 2018-05-23 RX ORDER — POTASSIUM CHLORIDE 750 MG/1
40 CAPSULE, EXTENDED RELEASE ORAL AS NEEDED
Status: DISCONTINUED | OUTPATIENT
Start: 2018-05-23 | End: 2018-05-23 | Stop reason: HOSPADM

## 2018-05-23 RX ORDER — CLINDAMYCIN HYDROCHLORIDE 300 MG/1
300 CAPSULE ORAL EVERY 8 HOURS SCHEDULED
Qty: 24 CAPSULE | Refills: 0 | Status: SHIPPED | OUTPATIENT
Start: 2018-05-23 | End: 2018-05-31

## 2018-05-23 RX ADMIN — FLUTICASONE PROPIONATE 2 SPRAY: 50 SPRAY, METERED NASAL at 08:19

## 2018-05-23 RX ADMIN — FOLIC ACID 1 MG: 1 TABLET ORAL at 08:19

## 2018-05-23 RX ADMIN — CLINDAMYCIN PHOSPHATE 600 MG: 12 INJECTION, SOLUTION INTRAVENOUS at 05:14

## 2018-05-23 RX ADMIN — POTASSIUM CHLORIDE 40 MEQ: 750 CAPSULE, EXTENDED RELEASE ORAL at 10:38

## 2018-05-23 RX ADMIN — NYSTATIN: 100000 POWDER TOPICAL at 08:19

## 2018-05-23 RX ADMIN — SODIUM CHLORIDE 125 ML/HR: 9 INJECTION, SOLUTION INTRAVENOUS at 09:54

## 2018-05-23 RX ADMIN — ESCITALOPRAM OXALATE 10 MG: 10 TABLET ORAL at 05:18

## 2018-05-23 RX ADMIN — ACETAMINOPHEN 650 MG: 325 TABLET, FILM COATED ORAL at 09:08

## 2018-05-23 RX ADMIN — Medication 250 MG: at 10:38

## 2018-05-23 RX ADMIN — CALCIUM POLYCARBOPHIL 1250 MG: 625 TABLET, FILM COATED ORAL at 08:19

## 2018-05-23 RX ADMIN — CLINDAMYCIN HYDROCHLORIDE 300 MG: 150 CAPSULE ORAL at 13:46

## 2018-05-23 RX ADMIN — PROPRANOLOL HYDROCHLORIDE 20 MG: 20 TABLET ORAL at 08:19

## 2018-05-23 RX ADMIN — PANTOPRAZOLE SODIUM 40 MG: 40 TABLET, DELAYED RELEASE ORAL at 08:19

## 2018-05-23 RX ADMIN — POTASSIUM CHLORIDE 40 MEQ: 750 CAPSULE, EXTENDED RELEASE ORAL at 13:44

## 2018-05-23 RX ADMIN — LISINOPRIL 10 MG: 10 TABLET ORAL at 08:19

## 2018-05-23 RX ADMIN — NALTREXONE HYDROCHLORIDE 50 MG: 50 TABLET, FILM COATED ORAL at 08:19

## 2018-05-23 RX ADMIN — HALOPERIDOL 5 MG: 5 TABLET ORAL at 09:54

## 2018-05-23 RX ADMIN — CLINDAMYCIN PHOSPHATE 600 MG: 12 INJECTION, SOLUTION INTRAVENOUS at 00:46

## 2018-05-23 RX ADMIN — NORGESTIMATE AND ETHINYL ESTRADIOL 1 TABLET: KIT at 08:19

## 2018-05-23 RX ADMIN — CETIRIZINE HYDROCHLORIDE 10 MG: 10 TABLET, FILM COATED ORAL at 08:19

## 2018-05-23 NOTE — PROGRESS NOTES
Continued Stay Note   [facility]     Patient Name: Fartun Amin  MRN: [id number]  Today's Date: 5/23/2018    Admit Date: 5/21/2018          Discharge Plan     Row Name 05/23/18 1111       Plan    Plan Group Home    Plan Comments Plan to transfer [facility] to [facility] [place] today. Called and spoke with La Verkin/[facility] to let her know patient is medically ready to come back today. La Verkin to call back with time they will . RN to call report to Francy/[phone] , if  no answer then don't leave message call Breanna at [phone]. Fax dishcarge summary to [phone]. Updated RN [place] @ [phone]               Discharge Codes    No documentation.       Expected Discharge Date and Time     Expected Discharge Date Expected Discharge Time    May 23, 2018             Beth Smith RN

## 2018-05-23 NOTE — PROGRESS NOTES
Continued Stay Note  [facility]     Patient Name: Fartun Amin  MRN: [id number]  Today's Date: 5/23/2018    Admit Date: 5/21/2018          Discharge Plan     Row Name 05/23/18 1534       Plan    Plan Group Home    Plan Comments Patient transferring back to group home today, [facility] @ [phone] and  providing transportation, plan to  patient around 1530. Group home will contact RN as they arrive. Faxed AVS summary with fixed discharge medications. Spoke with Jass/Pharmacy @ [phone] and he changed some meds to correct dosage needed for transfer. Sukhwinder paged and spoke with  and she plans to change these in discharge summary as well. Trista @ [phone]     Final Discharge Disposition Code 01 - home or self-care              Discharge Codes    No documentation.       Expected Discharge Date and Time     Expected Discharge Date Expected Discharge Time    May 23, 2018             Beth Smith RN

## 2018-05-23 NOTE — THERAPY EVALUATION
Acute Care - Physical Therapy Initial Evaluation  [facility]     Patient Name: Fartun Amin  : 1990  MRN: [id number]  Today's Date: 2018   Onset of Illness/Injury or Date of Surgery: 18  Date of Referral to PT: 18  Referring Physician: Dr Wilson      Admit Date: 2018    Visit Dx:     ICD-10-CM ICD-9-CM   1. Somnolence R40.0 780.09   2. Hypoxia R09.02 799.02   3. Hypotension, unspecified hypotension type I95.9 458.9   4. Acute renal failure, unspecified acute renal failure type N17.9 584.9   5. Pain of both hip joints M25.551 719.45    M25.552    6. Pharyngitis, unspecified etiology J02.9 462   7. Acute cystitis without hematuria N30.00 595.0   8. Impaired functional mobility, balance, gait, and endurance Z74.09 V49.89     Patient Active Problem List   Diagnosis   • Abnormal drug screen   • LORE (acute kidney injury)   • Altered mental status   • Sepsis   • UTI (urinary tract infection)   • HTN (hypertension)   • Adjustment disorder   • Personality disorder   • PTSD (post-traumatic stress disorder)   • PCOS (polycystic ovarian syndrome)   • Intermittent explosive disorder   • Suicide attempt by drug ingestion   • Hyperkalemia   • GERD (gastroesophageal reflux disease)   • Asthma   • QT prolongation   • Pain of right hip joint   • Weakness   • Transaminitis   • Anemia   • Fall   • Peritonsillar abscess   • Somnolence     Past Medical History:   Diagnosis Date   • Abnormal drug screen 2018   • ADHD (attention deficit hyperactivity disorder)    • Adjustment disorder    • LORE (acute kidney injury) 2018   • Altered mental status 2018   • Asthma    • Cognitive impairment    • GERD (gastroesophageal reflux disease)    • Hearing impairment    • HTN (hypertension) 2018   • Hyperkalemia 2018   • Hypertension    • Intermittent explosive disorder    • Mood disorder    • MR (mental retardation), moderate    • Obesity    • PCOS (polycystic ovarian syndrome)    •  Peritonsillar abscess 5/21/2018   • Personality disorder    • PTSD (post-traumatic stress disorder)    • QT prolongation 4/23/2018   • Sepsis 4/23/2018   • Suicide attempt by drug ingestion 4/23/2018   • UTI (urinary tract infection) 4/23/2018     History reviewed. No pertinent surgical history.     PT ASSESSMENT (last 12 hours)      Physical Therapy Evaluation     Row Name 05/23/18 0901          PT Evaluation Time/Intention    Subjective Information no complaints  -KM     Document Type evaluation  -KM     Mode of Treatment physical therapy  -KM     Patient Effort good  -KM     Symptoms Noted During/After Treatment none  -KM     Row Name 05/23/18 0901          General Information    Patient Profile Reviewed? yes  -KM     Onset of Illness/Injury or Date of Surgery 05/21/18  -KM     Referring Physician Dr Wilson  -KM     Patient Observations alert;cooperative;agree to therapy   Pt standing walking out of bathroom  -KM     Prior Level of Function independent:;gait;transfer;bed mobility;ADL's  -KM     Equipment Currently Used at Home none  -KM     Pertinent History of Current Functional Problem Pt with extensive psychiatric hx including PTSD, emotional outbursts currently living in group home, called EMS to help her get up from the floor. Previous [facility] admit 5/1-5/16 for myositis/rhabdo  -KM     Existing Precautions/Restrictions fall  -KM     Limitations/Impairments hearing;safety/cognitive  -KM     Risks Reviewed patient:;LOB  -KM     Benefits Reviewed patient:;improve function  -KM     Barriers to Rehab medically complex;cognitive status;hearing deficit;ineffective coping   pt reads lip, knows ASL  -KM     Row Name 05/23/18 0901          Relationship/Environment    Primary Source of Support/Comfort extended family  -KM     Name(s) of Who Lives With Patient --   group home  -KM     Row Name 05/23/18 0901          Resource/Environmental Concerns    Current Living Arrangements group home  -KM     Row Name 05/23/18  0901          Home Main Entrance    Number of Stairs, Main Entrance seven  -KM     Row Name 05/23/18 0901          Cognitive Assessment/Intervention- PT/OT    Orientation Status (Cognition) oriented to;person;place  -KM     Follows Commands (Cognition) follows one step commands;75-90% accuracy;repetition of directions required  -[facility] Name 05/23/18 0901          Safety Issues, Functional Mobility    Safety Issues Affecting Function (Mobility) insight into deficits/self awareness;safety precautions follow-through/compliance  -KM     Row Name 05/23/18 0901          Bed Mobility Assessment/Treatment    Comment (Bed Mobility) --   not observed  -KM     Row Name 05/23/18 0901          Transfer Assessment/Treatment    Transfer Assessment/Treatment stand-sit transfer  -     Stand-Sit [place] (Transfers) verbal cues;contact guard  -KM     Row Name 05/23/18 0901          Gait/Stairs Assessment/Training    [place] Level (Gait) contact guard  -     Assistive Device (Gait) walker, front-wheeled  -KM     Distance in Feet (Gait) 225  -KM     Pattern (Gait) step-through  -KM     Deviations/Abnormal Patterns (Gait) maicol decreased;stride length decreased  -[facility] Name 05/23/18 0901          General ROM    GENERAL ROM COMMENTS --   B l/es wfls  -[facility] Name 05/23/18 0901          General Assessment (Manual Muscle Testing)    General Manual Muscle Testing (MMT) Assessment no strength deficits identified  -KM     Row Name 05/23/18 0901          Motor Assessment/Intervention    Additional Documentation Balance (Group)  -KM     Row Name 05/23/18 0901          Balance    Balance static standing balance;dynamic standing balance  -KM     Row Name 05/23/18 0901          Static Standing Balance    Level of [place] (Supported Standing, Static Balance) contact guard assist  -KM     Row Name 05/23/18 0901          Dynamic Standing Balance    Level of [place], Reaches Outside Midline (Standing, Dynamic  Balance) minimal assist, 75% patient effort  -[facility] Name 05/23/18 0901          Pain Assessment    Additional Documentation Pain Scale: Numbers Pre/Post-Treatment (Group)  -[facility] Name 05/23/18 0901          Pain Scale: Numbers Pre/Post-Treatment    Pain Scale: Numbers, Pretreatment 0/10 - no pain  -     Pain Scale: Numbers, Post-Treatment 0/10 - no pain  -[facility] Name 05/23/18 0901          Coping    Observed Emotional State cooperative  -[facility] Name 05/23/18 0901          Plan of Care Review    Plan of Care Reviewed With patient  -[facility] Name 05/23/18 01          Physical Therapy Clinical Impression    Date of Referral to PT 05/21/18  -     PT Diagnosis (PT Clinical Impression) impaired mobility  -     Patient/Family Goals Statement (PT Clinical Impression) pt. did not state but would like a therapy dog  -     Criteria for Skilled Interventions Met (PT Clinical Impression) yes  -KM     Rehab Potential (PT Clinical Summary) good, to achieve stated therapy goals  -     Care Plan Review (PT) evaluation/treatment results reviewed;care plan/treatment goals reviewed;risks/benefits reviewed;patient/other agree to care plan  -KM     Row Name 05/23/18 0901          Physical Therapy Goals    Bed Mobility Goal Selection (PT) bed mobility, PT goal 1  -KM     Transfer Goal Selection (PT) transfer, PT goal 1  -KM     Gait Training Goal Selection (PT) gait training, PT goal 1  -KM     Stairs Goal Selection (PT) stairs, PT goal 1  -KM     Additional Documentation Stairs Goal Selection (PT) (Row)  -KM     Row Name 05/23/18 0901          Bed Mobility Goal 1 (PT)    Activity/Assistive Device (Bed Mobility Goal 1, PT) bed mobility activities, all  -KM     [place] Level/Cues Needed (Bed Mobility Goal 1, PT) independent  -KM     Time Frame (Bed Mobility Goal 1, PT) 10 days  -[facility] Name 05/23/18 0901          Transfer Goal 1 (PT)    Activity/Assistive Device (Transfer Goal 1, PT)  sit-to-stand/stand-to-sit;bed-to-chair/chair-to-bed  -KM     [place] Level/Cues Needed (Transfer Goal 1, PT) independent  -KM     Time Frame (Transfer Goal 1, PT) 10 days  -KM     Row Name 05/23/18 0901          Gait Training Goal 1 (PT)    Activity/Assistive Device (Gait Training Goal 1, PT) walker, rolling  -KM     [place] Level (Gait Training Goal 1, PT) independent  -KM     Distance (Gait Goal 1, PT) 300  -KM     Time Frame (Gait Training Goal 1, PT) 10 days  -KM     Row Name 05/23/18 0901          Stairs Goal 1 (PT)    Activity/Assistive Device (Stairs Goal 1, PT) ascending stairs;descending stairs;using handrail, left;step-to-step  -KM     [place] Level/Cues Needed (Stairs Goal 1, PT) supervision required  -KM     Number of Stairs (Stairs Goal 1, PT) 7  -KM     Time Frame (Stairs Goal 1, PT) 10 days  -KM     Row Name 05/23/18 0901          Positioning and Restraints    Pre-Treatment Position standing in room  -KM     Post Treatment Position chair  -KM     In Chair reclined;call light within reach;encouraged to call for assist;with other staff   sitter  -KM     Row Name 05/23/18 0901          Living Environment    Home Accessibility stairs to enter home  -KM       User Key  (r) = Recorded By, (t) = Taken By, (c) = Cosigned By    Initials Name Provider Type    ROSSY Townsend, PT Physical Therapist          Physical Therapy Education     Title: PT OT SLP Therapies (Done)     Topic: Physical Therapy (Done)     Point: Mobility training (Done)    Learning Progress Summary     Learner Status Readiness Method Response Comment Documented by    Patient Done Acceptance E VU discussed plan of care and benfits of activity KM 05/23/18 1011          Point: Home exercise program (Done)    Learning Progress Summary     Learner Status Readiness Method Response Comment Documented by    Patient Done Acceptance E VU discussed plan of care and benfits of activity KM 05/23/18 1011          Point: Body  mechanics (Done)    Learning Progress Summary     Learner Status Readiness Method Response Comment Documented by    Patient Done Acceptance E CIERRA discussed plan of care and benfits of activity KM 05/23/18 1011          Point: Precautions (Done)    Learning Progress Summary     Learner Status Readiness Method Response Comment Documented by    Patient Done Acceptance E VU discussed plan of care and benfits of activity KM 05/23/18 1011                      User Key     Initials Effective Dates Name Provider Type Discipline     06/19/15 -  Tiana Townsend, PT Physical Therapist PT                PT Recommendation and Plan  Anticipated Discharge Disposition (PT): group home  Planned Therapy Interventions (PT Eval): bed mobility training, gait training, transfer training, stair training  Therapy Frequency (PT Clinical Impression): daily  Outcome Summary/Treatment Plan (PT)  Anticipated Discharge Disposition (PT): group home  Plan of Care Reviewed With: patient  Outcome Summary: Pt ambulated with r wx 225 ft with c.g.assist, pt demonstrates potential for independent mobility and return to group home from physical standpoint. To benefit from skilled svcs to ensure safety on levels and stairs          Outcome Measures     Row Name 05/23/18 0901             How much help from another person do you currently need...    Turning from your back to your side while in flat bed without using bedrails? 4  -KM      Moving from lying on back to sitting on the side of a flat bed without bedrails? 4  -KM      Moving to and from a bed to a chair (including a wheelchair)? 3  -KM      Standing up from a chair using your arms (e.g., wheelchair, bedside chair)? 3  -KM      Climbing 3-5 steps with a railing? 3  -KM      To walk in hospital room? 3  -KM      AM-PAC 6 Clicks Score 20  -KM         Functional Assessment    Outcome Measure Options AM-PAC 6 Clicks Basic Mobility (PT)  -KM        User Key  (r) = Recorded By, (t) = Taken By,  (c) = Cosigned By    Initials Name Provider Type    ROSSY Townsend PT Physical Therapist           Time Calculation:         PT Charges     Row Name 05/23/18 1008             Time Calculation    Start Time 0901  -KM      PT Received On 05/23/18  -KM      PT Goal Re-Cert Due Date 06/02/18  -KM         Time Calculation- PT    Total Timed Code Minutes- PT 9 minute(s)  -KM        User Key  (r) = Recorded By, (t) = Taken By, (c) = Cosigned By    Initials Name Provider Type    ROSSY Townsend PT Physical Therapist          Therapy Charges for Today     Code Description Service Date Service Provider Modifiers Qty    45456185423 HC PT EVAL LOW COMPLEXITY 4 5/23/2018 Tiana Townsend, PT GP 1    66330015927 HC GAIT TRAINING EA 15 MIN 5/23/2018 Tiana Townsend, PT GP 1    33674590675 HC PT THER SUPP EA 15 MIN 5/23/2018 Tiana Townsend, PT GP 2          PT G-Codes  Outcome Measure Options: AM-PAC 6 Clicks Basic Mobility (PT)      Tiana Townsend, PT  5/23/2018

## 2018-05-23 NOTE — DISCHARGE INSTR - APPOINTMENTS
ADAM Harkins    PCP - General  Internal Medicine  Nurse Practitioner   [phone]  [phone] [address] [address]    Call if follow up is needed

## 2018-05-23 NOTE — DISCHARGE SUMMARY
[facility] Medicine Services  DISCHARGE SUMMARY    Patient Name: Fartun Amin  : 1990  MRN: [id number]    Date of Admission: 2018  Date of Discharge:  18    Primary Care Physician: ADAM Harkins    Hospital Course     Presenting Problem:   Somnolence [R40.0]    Active Hospital Problems (** Indicates Principal Problem)    Diagnosis Date Noted   • **Tonsillitis [J03.90] 2018   • Candida rash of groin [B37.89] 2018   • HTN (hypertension) [I10] 2018   • Adjustment disorder [F43.20] 2018   • Personality disorder [F60.9] 2018   • PTSD (post-traumatic stress disorder) [F43.10] 2018   • Intermittent explosive disorder [F63.81] 2018   • GERD (gastroesophageal reflux disease) [K21.9] 2018   • Asthma [J45.909] 2018      Resolved Hospital Problems    Diagnosis Date Noted Date Resolved   • Somnolence [R40.0] 2018   • LORE (acute kidney injury) [N17.9] 2018          Hospital Course:  Fartun Amin is a 27 y.o. female  normally lives in a group home, has h/o PTSD, moderate MR, adjustment and personality d/o, who has been in and out of [facility] several times in the past few months. Recently admitted here - due to inability to walk, diagnosed with hip myositis from prolonged immobilazation. She was brought back to the ED because staff couldn't get her off the floor, she was being combative and agitated.     While in ED, she had extensive workup to determine cause of her presentation since she was no verbally cooperative with history or ROS at that time (combination of psychiatric disposition/behavior and was also very somnolent due to previously being given sedative PTA; apparently had been very agitated/combative PTA).  Once more cooperative she c/o sore throat. CT neck showed poss peritonsillar abscess and she was started on appropriate empiric antibiotics.  ENT saw patient in  consultation and felt she did NOT have evidence of peritonsillar abscess but more consistent with acute tonsillitis or tonsillar cellulitis and recommended completion of Clindamycin course.  Patient has remained nontoxic, she is now alert and interactive without new complaints and is appropriate for d/c back to her group home.       Day of Discharge     HPI:   Up to chair, alert and talkative.  Throat pain resolving.  C/o burning due to her groin candida rash (recieving nystatin powder since yesterday).      Review of Systems  Otherwise ROS is negative except as mentioned in the HPI.    Vital Signs:   Temp:  [97.6 °F (36.4 °C)-98.9 °F (37.2 °C)] 97.7 °F (36.5 °C)  Heart Rate:  [73-96] 78  Resp:  [16-18] 16  BP: (101-119)/(43-84) 113/80     Physical Exam:  Constitutional: No acute distress, awake, alert, nontoxic, obese body habitus  HENT: NCAT, mucous membranes moist, no exudates  Neck: Supple, no thyromegaly, resolving tender cervical LAD  Respiratory: Clear to auscultation bilaterally, good effort, nonlabored respirations   Cardiovascular: RRR, no murmur  Musculoskeletal: No peripheral edema, normal muscle tone for age  Psychiatric: Odd affect, cooperative  Neurologic: Oriented x 3, movements symmetric BUE and BLE  Skin: groin dry candida rash      Pertinent  and/or Most Recent Results       Results from last 7 days  Lab Units 05/23/18  0532 05/22/18  0821 05/21/18  1339 05/17/18  0216   WBC 10*3/mm3 9.48 12.01* 12.19* 9.14   HEMOGLOBIN g/dL 9.8* 10.4* 11.2* 11.4*   HEMATOCRIT % 30.8* 33.2* 35.4 35.9   PLATELETS 10*3/mm3 193 225 260 398   SODIUM mmol/L 141 146 142 137   POTASSIUM mmol/L 3.4* 3.3* 5.2 4.1   CHLORIDE mmol/L 103 108 104 99   CO2 mmol/L 31.0 28.0 26.0 30.0   BUN mg/dL 11 16 40* 15   CREATININE mg/dL 0.60 0.60 1.60* 0.60   GLUCOSE mg/dL 84 101* 105* 87   CALCIUM mg/dL 9.0 9.4 10.2 10.1       Results from last 7 days  Lab Units 05/21/18  1339 05/17/18  0216   BILIRUBIN mg/dL 0.3 0.3   ALK PHOS U/L 69 84    ALT (SGPT) U/L 20 23   AST (SGOT) U/L 43* 25           Invalid input(s): TG, LDLCALC, LDLREALC      Brief Urine Lab Results  (Last result in the past 365 days)      Color   Clarity   Blood   Leuk Est   Nitrite   Protein   CREAT   Urine HCG        05/21/18 1602               Negative           Microbiology Results Abnormal     Procedure Component Value - Date/Time    Beta Strep Culture, Throat - Swab, Throat [[id number]]  (Normal) Collected:  05/21/18 0908    Lab Status:  Final result Specimen:  Swab from Throat Updated:  05/23/18 0815     Throat Culture, Beta Strep No Beta Hemolytic Streptococcus Isolated    Rapid Strep A Screen - Swab, Throat [[id number]]  (Normal) Collected:  05/21/18 0908    Lab Status:  Final result Specimen:  Swab from Throat Updated:  05/21/18 0927     Strep A Ag Negative    Narrative:         Test performed by Direct Antigen Testing.          Imaging Results (all)     Procedure Component Value Units Date/Time    CT Soft Tissue Neck With Contrast [[id number]] Collected:  05/22/18 0921     Updated:  05/22/18 1002    Narrative:       EXAMINATION: CT SOFT TISSUE NECK W CONTRAST-05/21/2018:      INDICATION: Evaluate for abscess; R40.0-Somnolence; R09.02-Hypoxemia;  I95.9-Hypotension, unspecified; N17.9-Acute kidney failure, unspecified;  M25.551-Pain in right hip; M25.552-Pain in left hip; J02.9-Acute  pharyngitis, unspecified; N30.00-Acute cystitis without hematuria, sore  throat.     TECHNIQUE: Multiple axial CT imaging was obtained of the soft tissue of  the neck following the administration of intravenous contrast.     The radiation dose reduction device was turned on for each scan per the  ALARA (As Low as Reasonably Achievable) protocol.     COMPARISON: NONE.     FINDINGS: The sinuses are grossly clear. There is asymmetry again seen  within the tonsillar pillars prominent on the left. There is some low  density seen in the prevertebral soft tissues in which a small abscess  cannot be  excluded. There is streak artifact identified from the dental  hardware obscuring image detail. Tonsillar abscess on the left cannot be  excluded. Direct visualization is recommended. There is no significant  adenopathy with several borderline enlarged lymph nodes seen in the  jugulodigastric region bilaterally. The salivary glands are  unremarkable. Degenerative changes seen within the spine.       Impression:       Asymmetry identified in the peritonsillar regions, left  greater than right, with some low density seen in the peritonsillar  region on the left extending into the prevertebral soft tissues. Abscess  cannot be excluded and clinical correlation is needed. Direct  visualization is recommended.     D:  05/21/2018  E:  05/22/2018           This report was finalized on 5/22/2018 10:00 AM by Dr. Janelle Paulson MD.       CT Head Without Contrast [[id number]] Collected:  05/22/18 0919     Updated:  05/22/18 1002    Narrative:       EXAMINATION: CT HEAD WO CONTRAST-      INDICATION: AMS; R40.0-Somnolence; R09.02-Hypoxemia; I95.9-Hypotension,  unspecified; N17.9-Acute kidney failure, unspecified; M25.551-Pain in  right hip; M25.552-Pain in left hip; J02.9-Acute pharyngitis,  unspecified; N30.00-Acute cystitis without hematuria.     TECHNIQUE: Multiple axial CT imaging was obtained of the head from the  skull base to the skull vertex without the administration of intravenous  contrast.     The radiation dose reduction device was turned on for each scan per the  ALARA (As Low as Reasonably Achievable) protocol.     COMPARISON: None.     FINDINGS: Parenchyma is unremarkable in appearance. No hemorrhage or  hydrocephalus. No mass, mass effect, or midline shift. No abnormal  extraaxial fluid collection is identified. The bony structures reveal no  evidence of osseous abnormality. Visualized paranasal sinuses are clear.  The mastoid air cells are patent.       Impression:       No acute intracranial abnormality.      D:  05/21/2018  E:  05/22/2018        This report was finalized on 5/22/2018 9:59 AM by Dr. Janelle Paulson MD.       CT Pelvis Without Contrast [[id number]] Collected:  05/22/18 0921     Updated:  05/22/18 1002    Narrative:       EXAMINATION: CT PELVIS WO CONTRAST-      INDICATION: R40.0-Somnolence; R09.02-Hypoxemia; I95.9-Hypotension,  unspecified; N17.9-Acute kidney failure, unspecified; M25.551-Pain in  right hip; M25.552-Pain in left hip; J02.9-Acute pharyngitis,  unspecified; N30.00-Acute cystitis without hematuria.     TECHNIQUE: Multiple axial CT imaging was obtained of the pelvis without  the administration of intravenous contrast.     The radiation dose reduction device was turned on for each scan per the  ALARA (As Low as Reasonably Achievable) protocol.     COMPARISON: NONE     FINDINGS: The pelvic portion of the gastrointestinal tract is within  normal limits. The appendix is radiographically normal in appearance. No  abdominal or retroperitoneal lymphadenopathy. No free fluid or free air.  The pelvic organs are unremarkable. No abnormal mass or fluid collection  is identified. The bony structures reveal no evidence of osseous  abnormality. Visualized bony structures are unremarkable. Minimal  degenerative change is seen within the posterior facets of the spine.  Both hips are within normal limits.       Impression:       No acute intrapelvic abnormality, bony structures are  unremarkable.     D:  05/21/2018  E:  05/22/2018        This report was finalized on 5/22/2018 9:59 AM by Dr. Janelle Paulson MD.       XR Chest 1 View [[id number]] Collected:  05/21/18 1341     Updated:  05/21/18 1440    Narrative:       EXAMINATION: XR CHEST 1 VW-05/21/2018:      INDICATION: AMS.      COMPARISON: 05/01/2018.     FINDINGS: Portable chest reveals low lung volumes. The heart is  borderline enlarged. No focal parenchymal opacification present.  No  pleural effusion or pneumothorax. The bony structures are  unremarkable.  Pulmonary vascularity is within normal limits.       Impression:       Low lung volumes with no acute parenchymal disease.     D:  05/21/2018  E:  05/21/2018     This report was finalized on 5/21/2018 2:38 PM by Dr. Janelle Paulson MD.       CT Soft Tissue Neck Without Contrast [[id number]] Collected:  05/21/18 1051     Updated:  05/21/18 1437    Narrative:       EXAMINATION: CT SOFT TISSUE NECK WO CONTRAST-      INDICATION: Poor historian; sore throat.     TECHNIQUE: Multiple axial CT imaging was obtained of the soft tissue  neck without the administration of intravenous contrast.     The radiation dose reduction device was turned on for each scan per the  ALARA (As Low as Reasonably Achievable) protocol.     COMPARISON: None.     FINDINGS: There is asymmetry identified in the tonsillar pillars with  prominence identified on the left. Given lack of intravenous contrast  findings may suggest swelling, however, a left peritonsillar abscess  cannot be excluded. This area measures approximately 3.3 x 2.3 cm.   Direct visualization is recommended. The thyroid is homogeneous. The  airways is patent. No bulky adenopathy. Degenerative change is seen  within the spine. The lung apices are grossly clear.       Impression:       Prominence of the left peritonsillar region suggesting  either an area of swelling, however, an abscess cannot be excluded given  lack of intravenous contrast on the examination. Direct visualization is  recommended.     D:  05/21/2018  E:  05/21/2018     This report was finalized on 5/21/2018 2:35 PM by Dr. Janelle Paulson MD.             Results for orders placed during the hospital encounter of 04/23/18   Duplex Venous Lower Extremity - Right CAR    Narrative · No evidence of deep or superficial venous thrombosis in the right lower   extremity.          Results for orders placed during the hospital encounter of 04/23/18   Duplex Venous Lower Extremity - Right CAR    Narrative  · No evidence of deep or superficial venous thrombosis in the right lower   extremity.          Results for orders placed during the hospital encounter of 05/01/18   Adult Transthoracic Echo Complete W/ Cont if Necessary Per Protocol    Narrative · Left ventricular systolic function is normal. Estimated EF = 65%.  · Calculated right ventricular systolic pressure from tricuspid   regurgitation is 21 mmHg.            Discharge Details      Fartun Amin   Home Medication Instructions MADELAINE:[id number]    Printed on:05/23/18 6006   Medication Information                      acetaminophen (TYLENOL) 325 MG tablet  Take 650 mg by mouth Every 6 (Six) Hours As Needed for mild pain (1-3).             albuterol (PROVENTIL HFA;VENTOLIN HFA) 108 (90 BASE) MCG/ACT inhaler  Inhale 2 puffs Every 4 (Four) Hours As Needed for wheezing.             atorvastatin (LIPITOR) 20 MG tablet  Take 20 mg by mouth Every Night.             benztropine (COGENTIN) 2 MG tablet  Take 0.5 tablets by mouth 3 (Three) Times a Day.             calcium carbonate (TUMS) 500 MG chewable tablet  Chew 1 tablet Every 6 (Six) Hours As Needed for Indigestion or Heartburn.             chlorproMAZINE (THORAZINE) 100 MG tablet  Take 400 mg by mouth 3 (Three) Times a Day.             clindamycin (CLEOCIN) 300 MG capsule  Take 1 capsule by mouth Every 8 (Eight) Hours for 24 doses.             clonazePAM (KlonoPIN) 0.5 MG tablet  Take 0.5 mg by mouth 2 (Two) Times a Day.             divalproex (DEPAKOTE) 250 MG DR tablet  Take 250 mg by mouth Every Morning.             divalproex (DEPAKOTE) 500 MG 24 hr tablet  Take 2 tablets by mouth Every Night.             EPINEPHrine (EPIPEN 2-RUTHIE) 0.3 MG/0.3ML solution auto-injector injection  As Needed.             escitalopram (LEXAPRO) 10 MG tablet  Take 10 mg by mouth Every Morning.             fluticasone (VERAMYST) 27.5 MCG/SPRAY nasal spray  2 sprays into each nostril Daily.             folic acid (FOLVITE) 1 MG  tablet  Take 1 mg by mouth Daily.             GUAIFENESIN PO  Take 600 mg by mouth 2 (Two) Times a Day As Needed.             haloperidol (HALDOL) 5 MG tablet  Take 5 mg by mouth Daily As Needed for Agitation.             hydrocortisone (PROCTOSOL HC) 2.5 % rectal cream  Insert 1 application into the rectum 2 (Two) Times a Day As Needed for Hemorrhoids.             hydrocortisone 1 % cream  Apply  topically Every 12 (Twelve) Hours.             ibuprofen (ADVIL,MOTRIN) 600 MG tablet  Take 600 mg by mouth 3 (Three) Times a Day.             ibuprofen (ADVIL,MOTRIN) 800 MG tablet  Take 800 mg by mouth Every 8 (Eight) Hours As Needed for Mild Pain .             lisinopril (PRINIVIL,ZESTRIL) 10 MG tablet  Take 10 mg by mouth Daily.             LORazepam (ATIVAN) 1 MG tablet  Take 1 mg by mouth Every 6 (Six) Hours As Needed for Anxiety.             Menthol (HALLS COUGH DROPS) 5.8 MG lozenge  Dissolve 1 lozenge in the mouth As Needed.             montelukast (SINGULAIR) 10 MG tablet  Take 10 mg by mouth Every Night.             naltrexone (DEPADE) 50 MG tablet  Take 50 mg by mouth Daily.             neomycin-bacitracin-polymyxin (NEOSPORIN) 5-400-5000 ointment  Apply  topically As Needed.             Norgestimate-Eth Estradiol (SPRINTEC 28 PO)  Take 1 tablet by mouth Daily.             nystatin (MYCOSTATIN) 512900 UNIT/GM powder  Apply  topically 3 (Three) Times a Day for 14 days.             OLANZapine (zyPREXA) 20 MG tablet  Take 1 tablet by mouth Every Night.             ondansetron (ZOFRAN) 4 MG tablet  Take 4 mg by mouth Every 6 (Six) Hours As Needed for Nausea or Vomiting.             pantoprazole (PROTONIX) 40 MG EC tablet  Take 40 mg by mouth Daily.             PEG 3350-KCl-NaBcb-NaCl-NaSulf (PEG 3350/ELECTROLYTES PO)  Take 240 mL by mouth Every 30 (Thirty) Minutes As Needed.             polycarbophil (FIBERCON) 625 MG tablet  Take 1,250 mg by mouth 2 (Two) Times a Day.             polyethylene glycol (MIRALAX)  packet  Take 17 g by mouth 2 (Two) Times a Day.             promethazine (PHENERGAN) 25 MG tablet  Take 25 mg by mouth Every 6 (Six) Hours As Needed for nausea or vomiting.             propranolol (INDERAL) 20 MG tablet  Take 20 mg by mouth 3 (Three) Times a Day.             raNITIdine (ZANTAC) 150 MG tablet  Take 150 mg by mouth Every Night.             saccharomyces boulardii (FLORASTOR) 250 MG capsule  Take 1 capsule by mouth 2 (Two) Times a Day for 19 doses.             sodium chloride (OCEAN) 0.65 % nasal spray  2 sprays into each nostril 2 (Two) Times a Day.             traZODone (DESYREL) 50 MG tablet  Take 50 mg by mouth Every Night.             triamcinolone (KENALOG) 0.025 % cream  Apply 1 application topically 2 (Two) Times a Day As Needed.                   Discharge Disposition:  Home or Self Care    Discharge Diet:  Diet Instructions     Advance Diet As Tolerated             Discharge Activity:   Activity Instructions     Activity as Tolerated             No future appointments.        Time Spent on Discharge:  40 minutes    Electronically signed by Elisabeth Jarquin MD, 05/23/18, 10:48 AM.

## 2018-05-23 NOTE — PLAN OF CARE
Problem: Fall Risk (Adult)  Goal: Absence of Fall  Outcome: Outcome(s) achieved Date Met: 05/23/18      Problem: Skin Injury Risk (Adult)  Goal: Identify Related Risk Factors and Signs and Symptoms  Outcome: Outcome(s) achieved Date Met: 05/23/18    Goal: Skin Health and Integrity  Outcome: Outcome(s) achieved Date Met: 05/23/18      Problem: Pain, Acute (Adult)  Goal: Identify Related Risk Factors and Signs and Symptoms  Outcome: Outcome(s) achieved Date Met: 05/23/18    Goal: Acceptable Pain Control/Comfort Level  Outcome: Outcome(s) achieved Date Met: 05/23/18

## 2018-05-23 NOTE — PLAN OF CARE
Problem: Fall Risk (Adult)  Goal: Identify Related Risk Factors and Signs and Symptoms  Outcome: Ongoing (interventions implemented as appropriate)    Goal: Absence of Fall  Outcome: Ongoing (interventions implemented as appropriate)      Problem: Skin Injury Risk (Adult)  Goal: Identify Related Risk Factors and Signs and Symptoms  Outcome: Ongoing (interventions implemented as appropriate)    Goal: Skin Health and Integrity  Outcome: Ongoing (interventions implemented as appropriate)      Problem: Pain, Acute (Adult)  Goal: Identify Related Risk Factors and Signs and Symptoms  Outcome: Ongoing (interventions implemented as appropriate)    Goal: Acceptable Pain Control/Comfort Level  Outcome: Ongoing (interventions implemented as appropriate)      Problem: Patient Care Overview  Goal: Plan of Care Review  Outcome: Ongoing (interventions implemented as appropriate)   05/23/18 0359   Coping/Psychosocial   Plan of Care Reviewed With patient   Plan of Care Review   Progress no change   OTHER   Outcome Summary Pt remains stable. Continue current POC

## 2018-05-23 NOTE — PLAN OF CARE
Problem: Patient Care Overview  Goal: Plan of Care Review  Outcome: Ongoing (interventions implemented as appropriate)   05/23/18 1009   Coping/Psychosocial   Plan of Care Reviewed With patient   OTHER   Outcome Summary Pt ambulated with r wx 225 ft with c.g.assist, pt demonstrates potential for independent mobility and return to group home from physical standpoint. To benefit from skilled svcs to ensure safety on levels and stairs

## 2018-10-03 ENCOUNTER — APPOINTMENT (OUTPATIENT)
Dept: GENERAL RADIOLOGY | Facility: HOSPITAL | Age: 28
End: 2018-10-03

## 2018-10-03 ENCOUNTER — HOSPITAL ENCOUNTER (OUTPATIENT)
Facility: HOSPITAL | Age: 28
Setting detail: OBSERVATION
Discharge: PSYCHIATRIC OR SUBSTANCE USE FACILITY | End: 2018-10-05
Attending: EMERGENCY MEDICINE | Admitting: EMERGENCY MEDICINE

## 2018-10-03 DIAGNOSIS — R53.1 GENERAL WEAKNESS: Primary | ICD-10-CM

## 2018-10-03 DIAGNOSIS — F11.90 OPIATE MISUSE: ICD-10-CM

## 2018-10-03 DIAGNOSIS — R45.851 SUICIDAL IDEATION: ICD-10-CM

## 2018-10-03 PROBLEM — Z79.899 POLYPHARMACY: Status: ACTIVE | Noted: 2018-10-03

## 2018-10-03 LAB
ALBUMIN SERPL-MCNC: 3.07 G/DL (ref 3.2–4.8)
ALBUMIN/GLOB SERPL: 1.6 G/DL (ref 1.5–2.5)
ALP SERPL-CCNC: 58 U/L (ref 25–100)
ALT SERPL W P-5'-P-CCNC: 33 U/L (ref 7–40)
AMMONIA BLD-SCNC: 14 UMOL/L (ref 19–60)
AMPHET+METHAMPHET UR QL: NEGATIVE
AMPHETAMINES UR QL: NEGATIVE
ANION GAP SERPL CALCULATED.3IONS-SCNC: 9 MMOL/L (ref 3–11)
APAP SERPL-MCNC: <10 MCG/ML (ref 0–30)
AST SERPL-CCNC: 28 U/L (ref 0–33)
BACTERIA UR QL AUTO: ABNORMAL /HPF
BARBITURATES UR QL SCN: NEGATIVE
BASOPHILS # BLD AUTO: 0.05 10*3/MM3 (ref 0–0.2)
BASOPHILS NFR BLD AUTO: 0.4 % (ref 0–1)
BENZODIAZ UR QL SCN: POSITIVE
BILIRUB SERPL-MCNC: 0.2 MG/DL (ref 0.3–1.2)
BILIRUB UR QL STRIP: ABNORMAL
BNP SERPL-MCNC: 12 PG/ML (ref 0–100)
BUN BLD-MCNC: 15 MG/DL (ref 9–23)
BUN/CREAT SERPL: 21.4 (ref 7–25)
BUPRENORPHINE SERPL-MCNC: NEGATIVE NG/ML
CALCIUM SPEC-SCNC: 8.1 MG/DL (ref 8.7–10.4)
CANNABINOIDS SERPL QL: NEGATIVE
CHLORIDE SERPL-SCNC: 103 MMOL/L (ref 99–109)
CK SERPL-CCNC: 108 U/L (ref 26–174)
CLARITY UR: ABNORMAL
CO2 SERPL-SCNC: 26 MMOL/L (ref 20–31)
COCAINE UR QL: NEGATIVE
COLOR UR: ABNORMAL
CREAT BLD-MCNC: 0.7 MG/DL (ref 0.6–1.3)
D-LACTATE SERPL-SCNC: 0.7 MMOL/L (ref 0.5–2)
DEPRECATED RDW RBC AUTO: 45.8 FL (ref 37–54)
EOSINOPHIL # BLD AUTO: 0.25 10*3/MM3 (ref 0–0.3)
EOSINOPHIL NFR BLD AUTO: 2.1 % (ref 0–3)
ERYTHROCYTE [DISTWIDTH] IN BLOOD BY AUTOMATED COUNT: 13.9 % (ref 11.3–14.5)
ETHANOL BLD-MCNC: <10 MG/DL (ref 0–10)
GFR SERPL CREATININE-BSD FRML MDRD: 100 ML/MIN/1.73
GLOBULIN UR ELPH-MCNC: 1.9 GM/DL
GLUCOSE BLD-MCNC: 81 MG/DL (ref 70–100)
GLUCOSE UR STRIP-MCNC: NEGATIVE MG/DL
HCT VFR BLD AUTO: 31.4 % (ref 34.5–44)
HGB BLD-MCNC: 10.1 G/DL (ref 11.5–15.5)
HGB UR QL STRIP.AUTO: NEGATIVE
HYALINE CASTS UR QL AUTO: ABNORMAL /LPF
IMM GRANULOCYTES # BLD: 0.25 10*3/MM3 (ref 0–0.03)
IMM GRANULOCYTES NFR BLD: 2.1 % (ref 0–0.6)
KETONES UR QL STRIP: ABNORMAL
LEUKOCYTE ESTERASE UR QL STRIP.AUTO: ABNORMAL
LYMPHOCYTES # BLD AUTO: 2.94 10*3/MM3 (ref 0.6–4.8)
LYMPHOCYTES NFR BLD AUTO: 24.3 % (ref 24–44)
MCH RBC QN AUTO: 29 PG (ref 27–31)
MCHC RBC AUTO-ENTMCNC: 32.2 G/DL (ref 32–36)
MCV RBC AUTO: 90.2 FL (ref 80–99)
METHADONE UR QL SCN: NEGATIVE
MONOCYTES # BLD AUTO: 1.16 10*3/MM3 (ref 0–1)
MONOCYTES NFR BLD AUTO: 9.6 % (ref 0–12)
MUCOUS THREADS URNS QL MICRO: ABNORMAL /HPF
NEUTROPHILS # BLD AUTO: 7.72 10*3/MM3 (ref 1.5–8.3)
NEUTROPHILS NFR BLD AUTO: 63.6 % (ref 41–71)
NITRITE UR QL STRIP: NEGATIVE
OPIATES UR QL: POSITIVE
OXYCODONE UR QL SCN: NEGATIVE
PCP UR QL SCN: POSITIVE
PH UR STRIP.AUTO: 6 [PH] (ref 5–8)
PLATELET # BLD AUTO: 281 10*3/MM3 (ref 150–450)
PMV BLD AUTO: 9 FL (ref 6–12)
POTASSIUM BLD-SCNC: 3.9 MMOL/L (ref 3.5–5.5)
PROCALCITONIN SERPL-MCNC: <0.05 NG/ML
PROPOXYPH UR QL: POSITIVE
PROT SERPL-MCNC: 5 G/DL (ref 5.7–8.2)
PROT UR QL STRIP: ABNORMAL
RBC # BLD AUTO: 3.48 10*6/MM3 (ref 3.89–5.14)
RBC # UR: ABNORMAL /HPF
REF LAB TEST METHOD: ABNORMAL
SALICYLATES SERPL-MCNC: 1.5 MG/DL (ref 0–29)
SODIUM BLD-SCNC: 138 MMOL/L (ref 132–146)
SP GR UR STRIP: >=1.03 (ref 1–1.03)
SQUAMOUS #/AREA URNS HPF: ABNORMAL /HPF
TRICYCLICS UR QL SCN: POSITIVE
TROPONIN I SERPL-MCNC: 0 NG/ML (ref 0–0.07)
TSH SERPL DL<=0.05 MIU/L-ACNC: 1.17 MIU/ML (ref 0.35–5.35)
UROBILINOGEN UR QL STRIP: ABNORMAL
VALPROATE SERPL-MCNC: 58 MCG/ML (ref 50–150)
WBC NRBC COR # BLD: 12.12 10*3/MM3 (ref 3.5–10.8)
WBC UR QL AUTO: ABNORMAL /HPF

## 2018-10-03 PROCEDURE — 82550 ASSAY OF CK (CPK): CPT | Performed by: EMERGENCY MEDICINE

## 2018-10-03 PROCEDURE — 84484 ASSAY OF TROPONIN QUANT: CPT

## 2018-10-03 PROCEDURE — 83605 ASSAY OF LACTIC ACID: CPT | Performed by: EMERGENCY MEDICINE

## 2018-10-03 PROCEDURE — 80164 ASSAY DIPROPYLACETIC ACD TOT: CPT | Performed by: EMERGENCY MEDICINE

## 2018-10-03 PROCEDURE — 80307 DRUG TEST PRSMV CHEM ANLYZR: CPT | Performed by: EMERGENCY MEDICINE

## 2018-10-03 PROCEDURE — G0378 HOSPITAL OBSERVATION PER HR: HCPCS

## 2018-10-03 PROCEDURE — 83880 ASSAY OF NATRIURETIC PEPTIDE: CPT | Performed by: EMERGENCY MEDICINE

## 2018-10-03 PROCEDURE — 84443 ASSAY THYROID STIM HORMONE: CPT | Performed by: EMERGENCY MEDICINE

## 2018-10-03 PROCEDURE — 93005 ELECTROCARDIOGRAM TRACING: CPT | Performed by: EMERGENCY MEDICINE

## 2018-10-03 PROCEDURE — 81001 URINALYSIS AUTO W/SCOPE: CPT | Performed by: EMERGENCY MEDICINE

## 2018-10-03 PROCEDURE — P9612 CATHETERIZE FOR URINE SPEC: HCPCS

## 2018-10-03 PROCEDURE — 80053 COMPREHEN METABOLIC PANEL: CPT | Performed by: EMERGENCY MEDICINE

## 2018-10-03 PROCEDURE — 99220 PR INITIAL OBSERVATION CARE/DAY 70 MINUTES: CPT | Performed by: HOSPITALIST

## 2018-10-03 PROCEDURE — 82140 ASSAY OF AMMONIA: CPT | Performed by: EMERGENCY MEDICINE

## 2018-10-03 PROCEDURE — 80306 DRUG TEST PRSMV INSTRMNT: CPT | Performed by: EMERGENCY MEDICINE

## 2018-10-03 PROCEDURE — 85025 COMPLETE CBC W/AUTO DIFF WBC: CPT | Performed by: EMERGENCY MEDICINE

## 2018-10-03 PROCEDURE — 84145 PROCALCITONIN (PCT): CPT | Performed by: EMERGENCY MEDICINE

## 2018-10-03 PROCEDURE — 99285 EMERGENCY DEPT VISIT HI MDM: CPT

## 2018-10-03 PROCEDURE — 71045 X-RAY EXAM CHEST 1 VIEW: CPT

## 2018-10-03 RX ORDER — ECHINACEA PURPUREA EXTRACT 125 MG
2 TABLET ORAL 2 TIMES DAILY
Status: DISCONTINUED | OUTPATIENT
Start: 2018-10-03 | End: 2018-10-05 | Stop reason: HOSPADM

## 2018-10-03 RX ORDER — PROPRANOLOL HYDROCHLORIDE 20 MG/1
20 TABLET ORAL 3 TIMES DAILY
Status: DISCONTINUED | OUTPATIENT
Start: 2018-10-03 | End: 2018-10-05 | Stop reason: HOSPADM

## 2018-10-03 RX ORDER — SODIUM CHLORIDE 0.9 % (FLUSH) 0.9 %
3-10 SYRINGE (ML) INJECTION AS NEEDED
Status: DISCONTINUED | OUTPATIENT
Start: 2018-10-03 | End: 2018-10-05 | Stop reason: HOSPADM

## 2018-10-03 RX ORDER — FLUOXETINE HYDROCHLORIDE 20 MG/1
20 CAPSULE ORAL DAILY
COMMUNITY

## 2018-10-03 RX ORDER — CLONAZEPAM 0.5 MG/1
0.5 TABLET ORAL EVERY 8 HOURS PRN
Status: DISCONTINUED | OUTPATIENT
Start: 2018-10-03 | End: 2018-10-05 | Stop reason: HOSPADM

## 2018-10-03 RX ORDER — OLANZAPINE 5 MG/1
20 TABLET ORAL NIGHTLY
Status: DISCONTINUED | OUTPATIENT
Start: 2018-10-03 | End: 2018-10-05 | Stop reason: HOSPADM

## 2018-10-03 RX ORDER — HALOPERIDOL 5 MG/1
5 TABLET ORAL DAILY PRN
Status: DISCONTINUED | OUTPATIENT
Start: 2018-10-03 | End: 2018-10-05 | Stop reason: HOSPADM

## 2018-10-03 RX ORDER — MONTELUKAST SODIUM 10 MG/1
10 TABLET ORAL NIGHTLY
Status: DISCONTINUED | OUTPATIENT
Start: 2018-10-03 | End: 2018-10-05 | Stop reason: HOSPADM

## 2018-10-03 RX ORDER — CALCIUM CARBONATE 200(500)MG
1 TABLET,CHEWABLE ORAL EVERY 6 HOURS PRN
Status: DISCONTINUED | OUTPATIENT
Start: 2018-10-03 | End: 2018-10-05 | Stop reason: HOSPADM

## 2018-10-03 RX ORDER — SODIUM CHLORIDE 0.9 % (FLUSH) 0.9 %
3 SYRINGE (ML) INJECTION EVERY 12 HOURS SCHEDULED
Status: DISCONTINUED | OUTPATIENT
Start: 2018-10-03 | End: 2018-10-05 | Stop reason: HOSPADM

## 2018-10-03 RX ORDER — ACETAMINOPHEN 325 MG/1
650 TABLET ORAL EVERY 6 HOURS PRN
Status: DISCONTINUED | OUTPATIENT
Start: 2018-10-03 | End: 2018-10-05 | Stop reason: HOSPADM

## 2018-10-03 RX ORDER — IBUPROFEN 600 MG/1
600 TABLET ORAL EVERY 8 HOURS SCHEDULED
Status: DISCONTINUED | OUTPATIENT
Start: 2018-10-03 | End: 2018-10-05 | Stop reason: HOSPADM

## 2018-10-03 RX ORDER — ATORVASTATIN CALCIUM 20 MG/1
20 TABLET, FILM COATED ORAL NIGHTLY
Status: DISCONTINUED | OUTPATIENT
Start: 2018-10-03 | End: 2018-10-05 | Stop reason: HOSPADM

## 2018-10-03 RX ORDER — FLUTICASONE PROPIONATE 50 MCG
2 SPRAY, SUSPENSION (ML) NASAL DAILY
Status: DISCONTINUED | OUTPATIENT
Start: 2018-10-04 | End: 2018-10-05 | Stop reason: HOSPADM

## 2018-10-03 RX ORDER — ONDANSETRON 2 MG/ML
4 INJECTION INTRAMUSCULAR; INTRAVENOUS EVERY 6 HOURS PRN
Status: DISCONTINUED | OUTPATIENT
Start: 2018-10-03 | End: 2018-10-05 | Stop reason: HOSPADM

## 2018-10-03 RX ORDER — DIVALPROEX SODIUM 250 MG/1
250 TABLET, DELAYED RELEASE ORAL EVERY MORNING
Status: DISCONTINUED | OUTPATIENT
Start: 2018-10-04 | End: 2018-10-05 | Stop reason: HOSPADM

## 2018-10-03 RX ORDER — ESCITALOPRAM OXALATE 10 MG/1
10 TABLET ORAL EVERY MORNING
Status: DISCONTINUED | OUTPATIENT
Start: 2018-10-04 | End: 2018-10-05 | Stop reason: HOSPADM

## 2018-10-03 RX ORDER — SODIUM CHLORIDE 9 MG/ML
125 INJECTION, SOLUTION INTRAVENOUS CONTINUOUS
Status: DISCONTINUED | OUTPATIENT
Start: 2018-10-03 | End: 2018-10-05 | Stop reason: HOSPADM

## 2018-10-03 RX ORDER — FLUOXETINE HYDROCHLORIDE 20 MG/1
20 CAPSULE ORAL DAILY
Status: DISCONTINUED | OUTPATIENT
Start: 2018-10-04 | End: 2018-10-05 | Stop reason: HOSPADM

## 2018-10-03 RX ORDER — FOLIC ACID 1 MG/1
1 TABLET ORAL DAILY
Status: DISCONTINUED | OUTPATIENT
Start: 2018-10-04 | End: 2018-10-05 | Stop reason: HOSPADM

## 2018-10-03 RX ORDER — PROMETHAZINE HYDROCHLORIDE 12.5 MG/1
12.5 TABLET ORAL EVERY 6 HOURS PRN
Status: DISCONTINUED | OUTPATIENT
Start: 2018-10-03 | End: 2018-10-05 | Stop reason: HOSPADM

## 2018-10-03 RX ORDER — PANTOPRAZOLE SODIUM 40 MG/1
40 TABLET, DELAYED RELEASE ORAL DAILY
Status: DISCONTINUED | OUTPATIENT
Start: 2018-10-04 | End: 2018-10-05 | Stop reason: HOSPADM

## 2018-10-03 RX ORDER — LISINOPRIL 10 MG/1
10 TABLET ORAL DAILY
Status: DISCONTINUED | OUTPATIENT
Start: 2018-10-04 | End: 2018-10-05

## 2018-10-03 RX ORDER — BENZTROPINE MESYLATE 1 MG/1
2 TABLET ORAL 2 TIMES DAILY
Status: DISCONTINUED | OUTPATIENT
Start: 2018-10-03 | End: 2018-10-05 | Stop reason: HOSPADM

## 2018-10-03 RX ORDER — DIVALPROEX SODIUM 500 MG/1
1500 TABLET, EXTENDED RELEASE ORAL NIGHTLY
Status: DISCONTINUED | OUTPATIENT
Start: 2018-10-03 | End: 2018-10-05 | Stop reason: HOSPADM

## 2018-10-03 RX ADMIN — SODIUM CHLORIDE 1000 ML: 9 INJECTION, SOLUTION INTRAVENOUS at 21:07

## 2018-10-03 NOTE — ED PROVIDER NOTES
Subjective   Fartun Amin is a 28 y.o.female who presents to the ED with complaints of fatigue. EMS reports the patient was found face down on the floor today. En route to the ED, her systolic blood pressure was 79 and her heart rate was 129. She has not taken any new medications. She also complains of chest pain and a decreased appetite. She has a history of schizophrenia, bipolar disorder, and psychiatric disorder. There are no other complaints at this time.         History provided by:  Patient and EMS personnel  History limited by:  Psychiatric disorder  Fatigue   Location:  Generalized  Quality:  Fatige  Severity:  Mild  Onset quality:  Sudden  Duration:  1 day  Timing:  Constant  Progression:  Unchanged  Chronicity:  New  Context:  Pt was found face down on the floor today by EMS  Relieved by:  None tried  Worsened by:  Nothing  Ineffective treatments:  None tried  Associated symptoms: chest pain and fatigue    Risk factors:  Hx of schizophrenia, bipolar disorder, and psychiatric disorder      Review of Systems   Unable to perform ROS: Psychiatric disorder   Constitutional: Positive for appetite change and fatigue.   Cardiovascular: Positive for chest pain.       Past Medical History:   Diagnosis Date   • Abnormal drug screen 4/23/2018   • ADHD (attention deficit hyperactivity disorder)    • Adjustment disorder    • LORE (acute kidney injury) (CMS/HCC) 4/23/2018   • Altered mental status 4/23/2018   • Asthma    • Cognitive impairment    • GERD (gastroesophageal reflux disease)    • Hearing impairment    • HTN (hypertension) 4/23/2018   • Hyperkalemia 4/23/2018   • Hypertension    • Intermittent explosive disorder    • Mood disorder (CMS/[facility])    • MR (mental retardation), moderate    • Obesity    • PCOS (polycystic ovarian syndrome)    • Peritonsillar abscess 5/21/2018   • Personality disorder (CMS/[facility])    • PTSD (post-traumatic stress disorder)    • QT prolongation 4/23/2018   • Sepsis (CMS/[facility]) 4/23/2018   •  Suicide attempt by drug ingestion (CMS/[facility]) 4/23/2018   • UTI (urinary tract infection) 4/23/2018       Allergies   Allergen Reactions   • Milk-Related Compounds Swelling     Of throat   • Penicillins Unknown (See Comments)     Pt does not know   • Sulfa Antibiotics Unknown (See Comments)     Pt does not know       History reviewed. No pertinent surgical history.    Family History   Problem Relation Age of Onset   • Family history unknown: Yes       Social History     Social History   • Marital status: Single     Social History Main Topics   • Smoking status: Never Smoker   • Smokeless tobacco: Never Used   • Alcohol use No   • Drug use: No      Comment: drug screen positive in ER   • Sexual activity: Defer     Other Topics Concern   • Not on file     Social History Narrative    Lives in a group home. Has a state guardian.         Objective   Physical Exam   Constitutional: She appears well-developed and well-nourished. No distress.   Pt is slow to answer questions and answers are minimal. She is mostly nodding or shaking head when asked about pain she mentions her chest only.    HENT:   Head: Normocephalic and atraumatic.   Nose: Nose normal.   Eyes: Conjunctivae are normal. No scleral icterus.   Neck: Normal range of motion. Neck supple.   Cardiovascular: Normal rate, regular rhythm and normal heart sounds.    No murmur heard.  Pulmonary/Chest: Effort normal and breath sounds normal. No respiratory distress.   Abdominal: Soft. Bowel sounds are normal. There is no tenderness.   Musculoskeletal: Normal range of motion. She exhibits no edema.   Neurological: She is alert.   She does not answer orientation questions.    Skin: Skin is warm and dry.   Psychiatric: She has a normal mood and affect.   Pt is somnolent.    Nursing note and vitals reviewed.      Procedures         ED Course  ED Course as of Oct 03 1926   Wed Oct 03, 2018   1540 Nursing staff report the patient has expressed thoughts of suicide and harm to  one of her roommates who is currently not living with her but hospitalized.  EMS reports that the patient has complained of suicidal and aggressive ideations on a recurrent basis.  They have seen her on a near weekly basis.  [MS]   1647 Dr. Hess spoke with the pharmacist to check on the possibly false positive with the urine drug screen results.   [TB]   1804 Dr. Hess attempted to call the patient's home but he was unable to get through.   [TB]   1855 Dr. Denson reviewed the patient's previous admissions from 5/1-516, 5/16-5/17, and 5/21-5/23.  [TB]   1856 Dr. Hess is bedside reevaluating the patient and discussing the lab results with her caretaker. Her caretaker states the patient will go to extreme measures, such as becoming suicidal, when she wants to stay in the hospital.   [TB]   1914 Dr. Hess paged the hospitalist.   [TB]   1919 Dr. Hess spoke with Dr. Wilson, hospitalist, who will admit the patient.   [TB]      ED Course User Index  [MS] Power Hess MD  [TB] Fidel Craig     Recent Results (from the past 24 hour(s))   Comprehensive Metabolic Panel    Collection Time: 10/03/18  3:28 PM   Result Value Ref Range    Glucose 81 70 - 100 mg/dL    BUN 15 9 - 23 mg/dL    Creatinine 0.70 0.60 - 1.30 mg/dL    Sodium 138 132 - 146 mmol/L    Potassium 3.9 3.5 - 5.5 mmol/L    Chloride 103 99 - 109 mmol/L    CO2 26.0 20.0 - 31.0 mmol/L    Calcium 8.1 (L) 8.7 - 10.4 mg/dL    Total Protein 5.0 (L) 5.7 - 8.2 g/dL    Albumin 3.07 (L) 3.20 - 4.80 g/dL    ALT (SGPT) 33 7 - 40 U/L    AST (SGOT) 28 0 - 33 U/L    Alkaline Phosphatase 58 25 - 100 U/L    Total Bilirubin 0.2 (L) 0.3 - 1.2 mg/dL    eGFR Non African Amer 100 >60 mL/min/1.73    Globulin 1.9 gm/dL    A/G Ratio 1.6 1.5 - 2.5 g/dL    BUN/Creatinine Ratio 21.4 7.0 - 25.0    Anion Gap 9.0 3.0 - 11.0 mmol/L   TSH    Collection Time: 10/03/18  3:28 PM   Result Value Ref Range    TSH 1.168 0.350 - 5.350 mIU/mL   Ethanol    Collection Time:  10/03/18  3:28 PM   Result Value Ref Range    Ethanol <10 0 - 10 mg/dL   Acetaminophen Level    Collection Time: 10/03/18  3:28 PM   Result Value Ref Range    Acetaminophen <10.0 0.0 - 30.0 mcg/mL   Salicylate Level    Collection Time: 10/03/18  3:28 PM   Result Value Ref Range    Salicylate 1.5 0.0 - 29.0 mg/dL   BNP    Collection Time: 10/03/18  3:28 PM   Result Value Ref Range    BNP 12.0 0.0 - 100.0 pg/mL   Valproic Acid Level, Total    Collection Time: 10/03/18  3:28 PM   Result Value Ref Range    Valproic Acid 58.0 50.0 - 150.0 mcg/mL   CBC Auto Differential    Collection Time: 10/03/18  3:28 PM   Result Value Ref Range    WBC 12.12 (H) 3.50 - 10.80 10*3/mm3    RBC 3.48 (L) 3.89 - 5.14 10*6/mm3    Hemoglobin 10.1 (L) 11.5 - 15.5 g/dL    Hematocrit 31.4 (L) 34.5 - 44.0 %    MCV 90.2 80.0 - 99.0 fL    MCH 29.0 27.0 - 31.0 pg    MCHC 32.2 32.0 - 36.0 g/dL    RDW 13.9 11.3 - 14.5 %    RDW-SD 45.8 37.0 - 54.0 fl    MPV 9.0 6.0 - 12.0 fL    Platelets 281 150 - 450 10*3/mm3    Neutrophil % 63.6 41.0 - 71.0 %    Lymphocyte % 24.3 24.0 - 44.0 %    Monocyte % 9.6 0.0 - 12.0 %    Eosinophil % 2.1 0.0 - 3.0 %    Basophil % 0.4 0.0 - 1.0 %    Immature Grans % 2.1 (H) 0.0 - 0.6 %    Neutrophils, Absolute 7.72 1.50 - 8.30 10*3/mm3    Lymphocytes, Absolute 2.94 0.60 - 4.80 10*3/mm3    Monocytes, Absolute 1.16 (H) 0.00 - 1.00 10*3/mm3    Eosinophils, Absolute 0.25 0.00 - 0.30 10*3/mm3    Basophils, Absolute 0.05 0.00 - 0.20 10*3/mm3    Immature Grans, Absolute 0.25 (H) 0.00 - 0.03 10*3/mm3   Ammonia    Collection Time: 10/03/18  3:28 PM   Result Value Ref Range    Ammonia 14 (L) 19 - 60 umol/L   Procalcitonin    Collection Time: 10/03/18  3:28 PM   Result Value Ref Range    Procalcitonin <0.05 <=0.25 ng/mL   Lactic Acid, Plasma    Collection Time: 10/03/18  3:28 PM   Result Value Ref Range    Lactate 0.7 0.5 - 2.0 mmol/L   CK    Collection Time: 10/03/18  3:28 PM   Result Value Ref Range    Creatine Kinase 108 26 - 174 U/L    POC Troponin, Rapid    Collection Time: 10/03/18  3:34 PM   Result Value Ref Range    Troponin I 0.00 0.00 - 0.07 ng/mL   Urinalysis With Microscopic If Indicated (No Culture) - Urine, Catheter    Collection Time: 10/03/18  3:51 PM   Result Value Ref Range    Color, UA Dark Yellow (A) Yellow, Straw    Appearance, UA Cloudy (A) Clear    pH, UA 6.0 5.0 - 8.0    Specific Gravity, UA >=1.030 1.001 - 1.030    Glucose, UA Negative Negative    Ketones, UA 40 mg/dL (2+) (A) Negative    Bilirubin, UA Moderate (2+) (A) Negative    Blood, UA Negative Negative    Protein,  mg/dL (2+) (A) Negative    Leuk Esterase, UA Trace (A) Negative    Nitrite, UA Negative Negative    Urobilinogen, UA 1.0 E.U./dL 0.2 - 1.0 E.U./dL   Urine Drug Screen - Urine, Catheter    Collection Time: 10/03/18  3:51 PM   Result Value Ref Range    THC, Screen, Urine Negative Negative    Phencyclidine (PCP), Urine Positive (A) Negative    Cocaine Screen, Urine Negative Negative    Methamphetamine, Urine Negative Negative    Opiate Screen Positive (A) Negative    Amphetamine Screen, Urine Negative Negative    Benzodiazepine Screen, Urine Positive (A) Negative    Tricyclic Antidepressants Screen Positive (A) Negative    Methadone Screen, Urine Negative Negative    Barbiturates Screen, Urine Negative Negative    Oxycodone Screen, Urine Negative Negative    Propoxyphene Screen Positive (A) Negative    Buprenorphine, Screen, Urine Negative Negative   Urinalysis, Microscopic Only - Urine, Clean Catch    Collection Time: 10/03/18  3:51 PM   Result Value Ref Range    RBC, UA 0-2 None Seen, 0-2 /HPF    WBC, UA 6-12 (A) None Seen, 0-2 /HPF    Bacteria, UA None Seen None Seen, Trace /HPF    Squamous Epithelial Cells, UA 3-6 (A) None Seen, 0-2 /HPF    Hyaline Casts, UA 0-6 0 - 6 /LPF    Mucus, UA Large/3+ (A) None Seen, Trace /HPF    Methodology Manual Light Microscopy      Note: In addition to lab results from this visit, the labs listed above may include labs  "taken at another facility or during a different encounter within the last 24 hours. Please correlate lab times with ED admission and discharge times for further clarification of the services performed during this visit.    XR Chest 1 View   Final Result   Negative chest x-ray.       D:  10/03/2018   E:  10/03/2018       This report was finalized on 10/3/2018 5:03 PM by Dr. Caden Washington MD.            Vitals:    10/03/18 1515 10/03/18 1630 10/03/18 1632 10/03/18 1757   BP: 99/70 104/65  104/68   BP Location: Right arm      Patient Position: Lying      Pulse: 101  70 87   Resp: 16      Temp: 98.4 °F (36.9 °C)      TempSrc: Oral      SpO2: 97%  96% 95%   Weight: 88.9 kg (196 lb)      Height: 157.5 cm (62\")        Medications   sodium chloride 0.9 % bolus 1,000 mL (not administered)     ECG/EMG Results (last 24 hours)     ** No results found for the last 24 hours. **        ECG 12 Lead   Final Result   Test Reason : cp   Blood Pressure : **/** mmHG   Vent. Rate : 095 BPM     Atrial Rate : 095 BPM      P-R Int : 152 ms          QRS Dur : 098 ms       QT Int : 394 ms       P-R-T Axes : 061 015 026 degrees      QTc Int : 495 ms      Normal sinus rhythm   Low voltage QRS   T wave abnormality, consider anterior ischemia   Prolonged QT   Abnormal ECG   Confirmed by NABIL IRWIN MD ([id number]) on 10/3/2018 10:09:55 PM      Referred By:  nela           Confirmed By:NABIL IRWIN MD                        [facility]    Final diagnoses:   General weakness   Opiate misuse   Suicidal ideation       Documentation assistance provided by cinthia Craig.  Information recorded by the cinthia was done at my direction and has been verified and validated by me.     Fidel Craig  10/03/18 1521       Fidel Craig  10/03/18 1926       Nabil Irwin MD  10/03/18 9938    "

## 2018-10-04 LAB
ANION GAP SERPL CALCULATED.3IONS-SCNC: 9 MMOL/L (ref 3–11)
BUN BLD-MCNC: 12 MG/DL (ref 9–23)
BUN/CREAT SERPL: 22.6 (ref 7–25)
CALCIUM SPEC-SCNC: 7.8 MG/DL (ref 8.7–10.4)
CHLORIDE SERPL-SCNC: 105 MMOL/L (ref 99–109)
CO2 SERPL-SCNC: 24 MMOL/L (ref 20–31)
CREAT BLD-MCNC: 0.53 MG/DL (ref 0.6–1.3)
GFR SERPL CREATININE-BSD FRML MDRD: 138 ML/MIN/1.73
GLUCOSE BLD-MCNC: 76 MG/DL (ref 70–100)
POTASSIUM BLD-SCNC: 3.5 MMOL/L (ref 3.5–5.5)
SODIUM BLD-SCNC: 138 MMOL/L (ref 132–146)

## 2018-10-04 PROCEDURE — G0378 HOSPITAL OBSERVATION PER HR: HCPCS

## 2018-10-04 PROCEDURE — 25010000002 ENOXAPARIN PER 10 MG: Performed by: HOSPITALIST

## 2018-10-04 PROCEDURE — 80048 BASIC METABOLIC PNL TOTAL CA: CPT | Performed by: HOSPITALIST

## 2018-10-04 PROCEDURE — 96361 HYDRATE IV INFUSION ADD-ON: CPT

## 2018-10-04 PROCEDURE — 96360 HYDRATION IV INFUSION INIT: CPT

## 2018-10-04 PROCEDURE — 96372 THER/PROPH/DIAG INJ SC/IM: CPT

## 2018-10-04 PROCEDURE — 99226 PR SBSQ OBSERVATION CARE/DAY 35 MINUTES: CPT | Performed by: INTERNAL MEDICINE

## 2018-10-04 RX ADMIN — ENOXAPARIN SODIUM 40 MG: 40 INJECTION SUBCUTANEOUS at 09:05

## 2018-10-04 RX ADMIN — OLANZAPINE 20 MG: 5 TABLET, FILM COATED ORAL at 21:19

## 2018-10-04 RX ADMIN — FLUOXETINE 20 MG: 20 CAPSULE ORAL at 09:05

## 2018-10-04 RX ADMIN — BENZTROPINE MESYLATE 2 MG: 1 TABLET ORAL at 21:19

## 2018-10-04 RX ADMIN — Medication 2 SPRAY: at 09:05

## 2018-10-04 RX ADMIN — FOLIC ACID 1 MG: 1 TABLET ORAL at 09:05

## 2018-10-04 RX ADMIN — DIVALPROEX SODIUM 250 MG: 250 TABLET, DELAYED RELEASE ORAL at 09:05

## 2018-10-04 RX ADMIN — BENZTROPINE MESYLATE 2 MG: 1 TABLET ORAL at 09:06

## 2018-10-04 RX ADMIN — SODIUM CHLORIDE 125 ML/HR: 9 INJECTION, SOLUTION INTRAVENOUS at 01:52

## 2018-10-04 RX ADMIN — IBUPROFEN 600 MG: 600 TABLET ORAL at 21:17

## 2018-10-04 RX ADMIN — Medication 3 ML: at 21:50

## 2018-10-04 RX ADMIN — DIVALPROEX SODIUM 1500 MG: 500 TABLET, FILM COATED, EXTENDED RELEASE ORAL at 21:17

## 2018-10-04 RX ADMIN — ATORVASTATIN CALCIUM 20 MG: 20 TABLET, FILM COATED ORAL at 21:17

## 2018-10-04 RX ADMIN — Medication 800 MG: at 09:05

## 2018-10-04 RX ADMIN — LISINOPRIL 10 MG: 10 TABLET ORAL at 09:04

## 2018-10-04 RX ADMIN — PROPRANOLOL HYDROCHLORIDE 20 MG: 20 TABLET ORAL at 21:20

## 2018-10-04 RX ADMIN — CLONAZEPAM 0.5 MG: 0.5 TABLET ORAL at 21:17

## 2018-10-04 RX ADMIN — SODIUM CHLORIDE 125 ML/HR: 9 INJECTION, SOLUTION INTRAVENOUS at 09:09

## 2018-10-04 RX ADMIN — PROPRANOLOL HYDROCHLORIDE 20 MG: 20 TABLET ORAL at 18:00

## 2018-10-04 RX ADMIN — MONTELUKAST SODIUM 10 MG: 10 TABLET, COATED ORAL at 21:17

## 2018-10-04 RX ADMIN — FLUTICASONE PROPIONATE 2 SPRAY: 50 SPRAY, METERED NASAL at 09:07

## 2018-10-04 RX ADMIN — PROPRANOLOL HYDROCHLORIDE 20 MG: 20 TABLET ORAL at 09:06

## 2018-10-04 RX ADMIN — CLONAZEPAM 0.5 MG: 0.5 TABLET ORAL at 09:13

## 2018-10-04 RX ADMIN — ESCITALOPRAM OXALATE 10 MG: 10 TABLET ORAL at 09:06

## 2018-10-04 RX ADMIN — Medication 10 ML: at 09:07

## 2018-10-04 RX ADMIN — Medication 2 SPRAY: at 21:17

## 2018-10-04 NOTE — PROGRESS NOTES
Continued Stay Note  [facility]     Patient Name: Fartun Amin  MRN: [id number]  Today's Date: 10/4/2018    Admit Date: 10/3/2018          Discharge Plan     Row Name 10/04/18 1403       Plan    Plan discharge plan    Plan Comments Pt sleeping. Pt has sitter in room.  Attempted to wake pt several times and unsuccessful. Pt from at group home. SW/CM will follow up with pt later today.               Discharge Codes    No documentation.           Valarie Ojeda RN

## 2018-10-04 NOTE — PROGRESS NOTES
"Discharge Planning Assessment  [facility]     Patient Name: Fartun Amin  MRN: [id number]  Today's Date: 10/4/2018    Admit Date: 10/3/2018          Discharge Needs Assessment     Row Name 10/04/18 1645       Living Environment    Lives With facility resident;other (see comments)   [facility]    Current Living Arrangements group home    Primary Care Provided by other (see comments)   Group home staff    Provides Primary Care For no one, unable/limited ability to care for self    Family Caregiver if Needed none    Quality of Family Relationships unable to assess    Able to Return to Prior Arrangements other (see comments)   Pt. must be back to her PLOF prior to returning to her group home.  She must be independent with ADLS, feeding herself, and mobility.       Transition Planning    Patient/Family Anticipates Transition to other (see comments)   TBD    Patient/Family Anticipated Services at Transition mental health services    Transportation Anticipated other (see comments)   Will likely need ambulance transfer or 's department if court order/citation is indicated       Discharge Needs Assessment    Readmission Within the Last 30 Days no previous admission in last 30 days    Concerns to be Addressed discharge planning;compliance issue;decision making;mental health;suicidal    Concerns Comments Pt. presented to the ER with AMS, but later endorsed suicidal ideation.    Equipment Currently Used at Home other (see comments)   Used a WC in the past    Equipment Needed After Discharge none    Discharge Facility/Level of Care Needs psychiatric facility    Discharge Coordination/Progress Pt. lives in a [facility] group [place] in [place].  She has a state guardian, Clair Jacobs.  Pt. is familiar to this hospital and SW.  Pt. currently endorses suidical ideation and states this is due to \"going through a lot of chaos.\"  She endorses active SI today during conversation with SW.  She did not report having a plan even " when specifically asked by GARRICK.  Pt. will need evaluation by [facility] prior to discharge.            Discharge Plan     Row Name 10/04/18 8723       Plan    Plan [facility] assessment/psychiatric facility when medically ready    Plan Comments GARRICK spoke with hospitalist about pt's disposition.  GARRICK met with pt. today to discuss suidical ideations.  She endorses having current thoughts, but no plan.  She reports being unhappy at her group home and wishes to be transferred to another group home.  Pt. was observed to have what appears to be recent scratch marks down her left forearm.  Pt. stated she did this to herself with her fingernails yesterday.  She stated the group home staff are on thier phones and don't watch her and alleged that when they see her self-harming they don't try to stop her.  Pt. appeared drowsy during the conversation.  Her eyes would roll back into her head and pt. looked as though she was about to fall asleep.  At times she would stop mid-sentence and stare blankly ahead.  Pt. would continue speaking when GARRICK called her name.  Prior to GARRICK meeting with pt., pt. was observed walking throughout the unit with a RN present for supervision.  Pt. was independent and did not require assistance during GARRICK's observation.  Pt. was observed to be independent with bed mobility upon returning to her hospital room.    Row Name 10/04/18 0320       Plan    Plan discharge plan    Plan Comments Pt sleeping. Pt has sitter in room.  Attempted to wake pt several times and unsuccessful. Pt from at group home. GARRICK/CHERI will follow up with pt later today.         Destination     No service coordination in this encounter.      Durable Medical Equipment     No service coordination in this encounter.      Dialysis/Infusion     No service coordination in this encounter.      Home Medical Care     No service coordination in this encounter.      Social Care     No service coordination in this encounter.                Demographic Summary      Row Name 10/04/18 1640       General Information    Admission Type observation    Arrived From home    Referral Source physician;nursing    Reason for Consult discharge planning    Preferred Language English     Used During This Interaction no    General Information Comments Pt. is followed by [facility] for primary care.       Contact Information    Permission Granted to Share Info With facility ;guardian            Functional Status     Row Name 10/04/18 1643       Mental Status Summary    Recent Changes in Mental Status/Cognitive Functioning mood;behavior patterns;learning abilities    Mental Status Comments Medical history notes moderate mental retardation, patterns of mood instability, PTSD, self-injurious behavior, and suicidality.       Employment/    Employment/ Comments Pt. has insurance through [place] Medicaid.            Psychosocial    No documentation.           Abuse/Neglect    No documentation.           Legal    No documentation.           Substance Abuse    No documentation.           Patient Forms    No documentation.         LESLY Queen

## 2018-10-04 NOTE — CONSULTS
Clinical Nutrition   Reason For Visit: Identified at risk by screening criteria, MST score 2+, Reduced oral intake    Patient Name: Fartun Amin  YOB: 1990  MRN: [id number]  Date of Encounter: 10/04/18 4:14 PM  Admission date: 10/3/2018        Nutrition Assessment     Hospital Problem List  Principal Problem:    Altered mental status  Active Problems:    Abnormal drug screen    HTN (hypertension)    Adjustment disorder    Personality disorder (CMS/[facility])    PTSD (post-traumatic stress disorder)    Intermittent explosive disorder    Asthma    QT prolongation    Polypharmacy    Suicide ideation        PMH: She  has a past medical history of Abnormal drug screen (4/23/2018); ADHD (attention deficit hyperactivity disorder); Adjustment disorder; LORE (acute kidney injury) (CMS/HCC) (4/23/2018); Altered mental status (4/23/2018); Asthma; Cognitive impairment; GERD (gastroesophageal reflux disease); Hearing impairment; HTN (hypertension) (4/23/2018); Hyperkalemia (4/23/2018); Hypertension; Intermittent explosive disorder; Mood disorder (CMS/[facility]); MR (mental retardation), moderate; Obesity; PCOS (polycystic ovarian syndrome); Peritonsillar abscess (5/21/2018); Personality disorder (CMS/[facility]); PTSD (post-traumatic stress disorder); QT prolongation (4/23/2018); Sepsis (CMS/[facility]) (4/23/2018); Suicide attempt by drug ingestion (CMS/[facility]) (4/23/2018); and UTI (urinary tract infection) (4/23/2018).   PSxH: She  has no past surgical history on file.          Reported/Observed/Food/Nutrition Related History   Patient and sitter present at visit. Pt reports so-so appetite both PTA and currently. Typically drinks Boost supplement when she does not eat a meal. Likes Boost supplements and wants one with each meal during this admission. Unsure if she has had any recent unintentional wt loss. Informed her that the EMR shows a previous bedscale wt of 231 lbs (May 2018)  and yesterday's standing weight was 176 lbs which would  reflect a significant wt loss within the last 5 months. Patient is unsure whether or not this is correct.        Anthropometrics   Height: 62 in  Weight: 176 lbs (standing wt 10/3 per nsg doc)  BMI: 32.3  BMI classification: Obese Class I: 30-34.9kg/m2   UBW: pt unsure      Weight change: RD unable to verify wt loss that EMR current reflects (55 lbs x 5 months - 231 to 176 lbs since May 2018). 231 lbs is bedscale wt from a previous admission.       Labs reviewed   Labs reviewed: Yes    Medications reviewed   Medications reviewed: Yes    Current Nutrition Prescription   PO: Diet Regular; Safe Tray    Evaluation of Received Nutrient/Fluid Intake: 50% / 2 meals      Nutrition Diagnosis     Problem Inadequate oral intake   Etiology Fair appetite   Signs/Symptoms 50% / 2 meals       Intervention   Intervention: Follow treatment progress, Care plan reviewed, Interview for preferences, Encourage intake     Ordered Boost Plus 3x daily      Goal:   General: Nutrition support treatment  PO: Increase intake      Monitoring/Evaluation:       Monitoring/Evaluation: Per protocol, PO intake, Supplement intake  Will Continue to follow per protocol  Abena Candelaria RD  Time Spent: 20 min

## 2018-10-04 NOTE — PLAN OF CARE
Problem: Patient Care Overview  Goal: Plan of Care Review  Outcome: Ongoing (interventions implemented as appropriate)   10/04/18 0508   Coping/Psychosocial   Plan of Care Reviewed With patient   Plan of Care Review   Progress no change       Problem: Fall Risk (Adult)  Goal: Absence of Fall  Outcome: Ongoing (interventions implemented as appropriate)   10/04/18 0508   Fall Risk (Adult)   Absence of Fall making progress toward outcome       Problem: Suicide Risk (Adult)  Goal: Strength-Based Wellness/Recovery  Outcome: Ongoing (interventions implemented as appropriate)   10/04/18 0508   Suicide Risk (Adult)   Strength-Based Wellness/Recovery making progress toward outcome       Problem: Skin Injury Risk (Adult)  Goal: Skin Health and Integrity  Outcome: Ongoing (interventions implemented as appropriate)   10/04/18 0508   Skin Injury Risk (Adult)   Skin Health and Integrity making progress toward outcome

## 2018-10-04 NOTE — H&P
[facility] Medicine Services  HISTORY AND PHYSICAL    Patient Name: Fartun Amin  : 1990  MRN: [id number]  Primary Care Physician: Jaclyn Jett APRN  Date of admission: 10/3/2018      Subjective   Subjective     Chief Complaint:  AMS    HPI:  Fartun Amin is a 27 y.o. female with moderate MR and lives at [facility] in [place]. She is a trimble of the [facility] and has caretakers around the clock at group home. Her state guardian appointee is Clair Jacobs per caretaker who accompanied pt to ED today. Her other history includes PTSD, adjustment and personality d/o, HTN, Depression/anxiety, GERD, and prior suicide attempt, who was brought in by EMS for evaluation of AMS/found face down on the floor. Caretaker unable to provide any additional history, but does report that pt is manipulative and has had history of self mutilation as well.  She denies any pain or discomfort at this time, but asking for food only. En route to ED, SBP was 79 and she was also tachycardic with . W/U in Ed relatively unremarkable, except for UDS, which was positive for multiple substance. As ED doctor was leaving the room, she mentioned that she wanted to kill herself. Given + UDS, pt is being admitted to the hospital for obs/monitor and will need to be referred to psychiatric facility for further care.    Review of Systems   Unobtainable    Personal History     Past Medical History:   Diagnosis Date   • Abnormal drug screen 2018   • ADHD (attention deficit hyperactivity disorder)    • Adjustment disorder    • LORE (acute kidney injury) (CMS/HCC) 2018   • Altered mental status 2018   • Asthma    • Cognitive impairment    • GERD (gastroesophageal reflux disease)    • Hearing impairment    • HTN (hypertension) 2018   • Hyperkalemia 2018   • Hypertension    • Intermittent explosive disorder    • Mood disorder (CMS/[facility])    • MR (mental retardation), moderate    • Obesity    • PCOS  (polycystic ovarian syndrome)    • Peritonsillar abscess 5/21/2018   • Personality disorder (CMS/[facility])    • PTSD (post-traumatic stress disorder)    • QT prolongation 4/23/2018   • Sepsis (CMS/[facility]) 4/23/2018   • Suicide attempt by drug ingestion (CMS/[facility]) 4/23/2018   • UTI (urinary tract infection) 4/23/2018       History reviewed. No pertinent surgical history.    Family History: Family history is unknown by patient.     Social History:  reports that she has never smoked. She has never used smokeless tobacco. She reports that she does not drink alcohol or use drugs.  Social History     Social History Narrative    Lives in a group home. Has a state guardian.       Medications:  Reviewed and reconciled    Allergies   Allergen Reactions   • Milk-Related Compounds Swelling     Of throat   • Penicillins Unknown (See Comments)     Pt does not know   • Sulfa Antibiotics Unknown (See Comments)     Pt does not know       Objective   Objective     Vital Signs:   Temp:  [98.4 °F (36.9 °C)] 98.4 °F (36.9 °C)  Heart Rate:  [] 91  Resp:  [16] 16  BP: ()/(65-72) 109/72        Physical Exam   General Assessment: No acute cardiopulmonary distress. Well developed and well nourished.    HEENT: NCAT, PERRL, lips and MM very dry    Neck: Supple    CVS: RRR, S1S2 normal, no murmurs    Resp: CTAB, no adventitious sound    Abd: soft, NT, ND, normal BS, no guarding or peritoneal signs    Ext: No edema, both calves are symmetric and NTTP    Neuro: Nonfocal    Skin: W/D/I. No rash.    Psych: Affect is appropriate    Results Reviewed:  I have personally reviewed current lab, radiology, and data and agree.      Results from last 7 days  Lab Units 10/03/18  1528   WBC 10*3/mm3 12.12*   HEMOGLOBIN g/dL 10.1*   HEMATOCRIT % 31.4*   PLATELETS 10*3/mm3 281       Results from last 7 days  Lab Units 10/03/18  1528   SODIUM mmol/L 138   POTASSIUM mmol/L 3.9   CHLORIDE mmol/L 103   CO2 mmol/L 26.0   BUN mg/dL 15   CREATININE mg/dL 0.70    GLUCOSE mg/dL 81   CALCIUM mg/dL 8.1*   ALT (SGPT) U/L 33   AST (SGOT) U/L 28     Estimated Creatinine Clearance: 125 mL/min (by C-G formula based on SCr of 0.7 mg/dL).  Brief Urine Lab Results  (Last result in the past 365 days)      Color   Clarity   Blood   Leuk Est   Nitrite   Protein   CREAT   Urine HCG        10/03/18 1551 Dark Yellow(A) Cloudy(A) Negative Trace(A) Negative 100 mg/dL (2+)(A)             BNP   Date Value Ref Range Status   10/03/2018 12.0 0.0 - 100.0 pg/mL Final     Comment:     Results may be falsely decreased if patient taking Biotin.     Imaging Results (last 24 hours)     Procedure Component Value Units Date/Time    XR Chest 1 View [[id number]] Collected:  10/03/18 1657     Updated:  10/03/18 1705    Narrative:       EXAMINATION: XR CHEST 1 VW-10/03/2018:      INDICATION: AMS.      COMPARISON: 05/21/2018.     FINDINGS: There is a normal cardiac silhouette. There has been  resolution of pulmonary disease when compared with 05/21/2018. At this  time there is no inflammatory process, mass or effusion.           Impression:       Negative chest x-ray.     D:  10/03/2018  E:  10/03/2018     This report was finalized on 10/3/2018 5:03 PM by Dr. Caden Washington MD.           Results for orders placed during the hospital encounter of 05/01/18   Adult Transthoracic Echo Complete W/ Cont if Necessary Per Protocol    Narrative · Left ventricular systolic function is normal. Estimated EF = 65%.  · Calculated right ventricular systolic pressure from tricuspid   regurgitation is 21 mmHg.          Assessment/Plan   Assessment / Plan     Hospital Problem List     Abnormal drug screen    Altered mental status    HTN (hypertension) (Chronic)    Adjustment disorder (Chronic)    Personality disorder (CMS/HCC) (Chronic)    PTSD (post-traumatic stress disorder) (Chronic)    Intermittent explosive disorder (Chronic)    GERD (gastroesophageal reflux disease) (Chronic)    Asthma (Chronic)    QT prolongation     Polypharmacy    Suicide ideation            Assessment & Plan:  Fartun Amin is a 27 y.o. female with moderate MR and lives at [facility] in [place], who is known to be manipulative, but also has significant chronic medical problems that include PTSD, adjustment and personality d/o, HTN, Depression/anxiety, GERD, and prior suicide attempt, who was brought in by EMS for evaluation of AMS/found face down on the floor. W/U unremarkable except for signs of dehydration and UDS that was positive for multiple substances. Pt also mentioned that she wanted to kill herself, so she is being admitted for obs and will need psychiatric care afterwards.    - Got 1L NS bolus, but still appears clinically dry, so will cont with NS at 125cc/hr  - Monitor on tele, pt is on multiple medications, at risks for wide complex arrhythmia given history of prolonged QTC. Radha held as many meds as I can, but probably can be trimmed some more. Pharmacy consulted to assist with couple of issues--polypharmacy and positive UDS, not sure if some of her home meds are contributing to a false positive screen.  - Sitter ordered at bedside for suicidal ideation, pt has had prior suicide attempts. CM/SW to see in am, will need psychiatric referral, she's been to the [facility] before.      DVT prophylaxis:Lovenox    CODE STATUS:    Code Status and Medical Interventions:   Ordered at: 10/03/18 2041     Code Status:    CPR     Medical Interventions (Level of Support Prior to Arrest):    Full       Admission Status:  I believe this patient meets OBSERVATION status, however if further evaluation or treatment plans warrant, status may change.  Based upon current information, I predict patient's care encounter to be less than or equal to 2 midnights.         Electronically signed by Lashonda Wilson MD, 10/03/18, 8:43 PM.

## 2018-10-04 NOTE — PLAN OF CARE
Problem: Patient Care Overview  Goal: Plan of Care Review  Outcome: Ongoing (interventions implemented as appropriate)   10/04/18 6573   Coping/Psychosocial   Plan of Care Reviewed With patient   Plan of Care Review   Progress no change   OTHER   Outcome Summary Pt doing well. VSS. ON room air. Ambulated in hallway. No c/o pain, or hallucinations at this time.        Problem: Fall Risk (Adult)  Goal: Absence of Fall  Outcome: Ongoing (interventions implemented as appropriate)      Problem: Suicide Risk (Adult)  Goal: Strength-Based Wellness/Recovery  Outcome: Ongoing (interventions implemented as appropriate)      Problem: Skin Injury Risk (Adult)  Goal: Identify Related Risk Factors and Signs and Symptoms  Outcome: Ongoing (interventions implemented as appropriate)    Goal: Skin Health and Integrity  Outcome: Ongoing (interventions implemented as appropriate)

## 2018-10-04 NOTE — PROGRESS NOTES
[facility] Medicine Services  PROGRESS NOTE    Patient Name: Fartun Amin  : 1990  MRN: [id number]    Date of Admission: 10/3/2018  Length of Stay: 0  Primary Care Physician: Jaclyn Jett APRN    Subjective   Subjective     CC:  F/u mental status changes    HPI:  Sitter present.  Cannot tell me what happened yesterday. Still says she wants to kill herself.    Review of Systems  Unable to obtain secondary to mental status.      Objective   Objective     Vital Signs:   Temp:  [97 °F (36.1 °C)-98.6 °F (37 °C)] 97.8 °F (36.6 °C)  Heart Rate:  [84-93] 88  Resp:  [16-18] 18  BP: (104-144)/(65-93) 113/70        Physical Exam:  Constitutional: awakens with loud stimuli, is able to maintain conversation, no acute distress  HENT: NCAT, mucous membranes moist  Respiratory: Clear to auscultation bilaterally, respiratory effort normal   Cardiovascular: RRR, no murmurs, rubs, or gallops, palpable pedal pulses bilaterally  Gastrointestinal: Positive bowel sounds, soft, nontender, nondistended  Musculoskeletal: No bilateral ankle edema  Psychiatric: Appropriate affect, cooperative  Neurologic: cannot/won't tell me year, place, etc.  No focal deficits  Skin: scratches/abraision to arm, scars from prior cutting      Results Reviewed:  I have personally reviewed current lab, radiology, and data and agree.      Results from last 7 days  Lab Units 10/03/18  1528   WBC 10*3/mm3 12.12*   HEMOGLOBIN g/dL 10.1*   HEMATOCRIT % 31.4*   PLATELETS 10*3/mm3 281       Results from last 7 days  Lab Units 10/04/18  0715 10/03/18  1528   SODIUM mmol/L 138 138   POTASSIUM mmol/L 3.5 3.9   CHLORIDE mmol/L 105 103   CO2 mmol/L 24.0 26.0   BUN mg/dL 12 15   CREATININE mg/dL 0.53* 0.70   GLUCOSE mg/dL 76 81   CALCIUM mg/dL 7.8* 8.1*   ALT (SGPT) U/L  --  33   AST (SGOT) U/L  --  28     Estimated Creatinine Clearance: 156.3 mL/min (A) (by C-G formula based on SCr of 0.53 mg/dL (L)).  BNP   Date Value Ref Range Status    10/03/2018 12.0 0.0 - 100.0 pg/mL Final     Comment:     Results may be falsely decreased if patient taking Biotin.       Microbiology Results Abnormal     None          Imaging Results (last 24 hours)     ** No results found for the last 24 hours. **        Results for orders placed during the hospital encounter of 05/01/18   Adult Transthoracic Echo Complete W/ Cont if Necessary Per Protocol    Narrative · Left ventricular systolic function is normal. Estimated EF = 65%.  · Calculated right ventricular systolic pressure from tricuspid   regurgitation is 21 mmHg.          I have reviewed the medications.      atorvastatin 20 mg Oral Nightly   benztropine 2 mg Oral BID   divalproex 1,500 mg Oral Nightly   divalproex 250 mg Oral QAM   enoxaparin 40 mg Subcutaneous Q24H   escitalopram 10 mg Oral QAM   FLUoxetine 20 mg Oral Daily   fluticasone 2 spray Each Nare Daily   folic acid 1 mg Oral Daily   ibuprofen 600 mg Oral Q8H   lisinopril 10 mg Oral Daily   magnesium oxide 800 mg Oral Daily   montelukast 10 mg Oral Nightly   OLANZapine 20 mg Oral Nightly   pantoprazole 40 mg Oral Daily   polyethylene glycol 17 g Oral Daily   propranolol 20 mg Oral TID   sodium chloride 2 spray Nasal BID   sodium chloride 3 mL Intravenous Q12H         Assessment/Plan   Assessment / Plan     Hospital Problem List     * (Principal)Altered mental status    Suicide ideation    Abnormal drug screen    HTN (hypertension) (Chronic)    Adjustment disorder (Chronic)    Personality disorder (CMS/HCC) (Chronic)    PTSD (post-traumatic stress disorder) (Chronic)    Intermittent explosive disorder (Chronic)    Asthma (Chronic)    QT prolongation    Polypharmacy             Brief Hospital Course to date:  Fartun Amin is a 28 y.o. female with psych issues presents from group home with mental status changes and suicidal ideation.      Assessment & Plan:    - patient is well known to [facility] from prior stays.  At this time all issues appear to be  psychiatric in nature.  - d/w SW.  Plan will be to discharge tomorrow - contact [place] first and then send to [facility] involuntarily - work with guardian.  - on multiple psych meds.  Unclear what she actually takes, try to limit due to mental status issues but she needs psych to manage these medications.    DVT Prophylaxis:  lovenox    CODE STATUS:   Code Status and Medical Interventions:   Ordered at: 10/03/18 2041     Code Status:    CPR     Medical Interventions (Level of Support Prior to Arrest):    Full       Disposition: I expect the patient to be discharged TBD    Electronically signed by Niles Cheatham MD, 10/04/18, 6:00 PM.

## 2018-10-05 VITALS
RESPIRATION RATE: 18 BRPM | OXYGEN SATURATION: 95 % | HEIGHT: 62 IN | DIASTOLIC BLOOD PRESSURE: 70 MMHG | BODY MASS INDEX: 32.46 KG/M2 | SYSTOLIC BLOOD PRESSURE: 108 MMHG | HEART RATE: 87 BPM | TEMPERATURE: 98 F | WEIGHT: 176.4 LBS

## 2018-10-05 PROCEDURE — 99217 PR OBSERVATION CARE DISCHARGE MANAGEMENT: CPT | Performed by: INTERNAL MEDICINE

## 2018-10-05 PROCEDURE — G0378 HOSPITAL OBSERVATION PER HR: HCPCS

## 2018-10-05 PROCEDURE — 96372 THER/PROPH/DIAG INJ SC/IM: CPT

## 2018-10-05 PROCEDURE — 25010000002 ENOXAPARIN PER 10 MG: Performed by: HOSPITALIST

## 2018-10-05 RX ADMIN — DIVALPROEX SODIUM 250 MG: 250 TABLET, DELAYED RELEASE ORAL at 08:06

## 2018-10-05 RX ADMIN — FLUOXETINE 20 MG: 20 CAPSULE ORAL at 08:06

## 2018-10-05 RX ADMIN — BENZTROPINE MESYLATE 2 MG: 1 TABLET ORAL at 08:06

## 2018-10-05 RX ADMIN — Medication 3 ML: at 08:06

## 2018-10-05 RX ADMIN — Medication 800 MG: at 08:06

## 2018-10-05 RX ADMIN — POLYETHYLENE GLYCOL 3350 17 G: 17 POWDER, FOR SOLUTION ORAL at 08:06

## 2018-10-05 RX ADMIN — ESCITALOPRAM OXALATE 10 MG: 10 TABLET ORAL at 08:06

## 2018-10-05 RX ADMIN — PANTOPRAZOLE SODIUM 40 MG: 40 TABLET, DELAYED RELEASE ORAL at 06:14

## 2018-10-05 RX ADMIN — Medication 2 SPRAY: at 08:08

## 2018-10-05 RX ADMIN — ENOXAPARIN SODIUM 40 MG: 40 INJECTION SUBCUTANEOUS at 08:06

## 2018-10-05 RX ADMIN — FOLIC ACID 1 MG: 1 TABLET ORAL at 08:06

## 2018-10-05 RX ADMIN — IBUPROFEN 600 MG: 600 TABLET ORAL at 06:14

## 2018-10-05 NOTE — DISCHARGE SUMMARY
[facility] Medicine Services  DISCHARGE SUMMARY    Patient Name: Fartun Amin  : 1990  MRN: [id number]    Date of Admission: 10/3/2018  Date of Discharge:  10/05/18  Primary Care Physician: Jaclyn Jett APRN    Consults     No orders found from 2018 to 10/4/2018.        Hospital Course     Presenting Problem:   Altered mental status [R41.82]    Active Hospital Problems    Diagnosis Date Noted   • **Altered mental status [R41.82] 2018     Priority: Medium   • Suicide ideation [R45.851] 10/03/2018     Priority: Medium   • Polypharmacy [Z79.899] 10/03/2018   • HTN (hypertension) [I10] 2018   • Adjustment disorder [F43.20] 2018   • Personality disorder (CMS/HCC) [F60.9] 2018   • PTSD (post-traumatic stress disorder) [F43.10] 2018   • Intermittent explosive disorder [F63.81] 2018   • Asthma [J45.909] 2018   • Abnormal drug screen [R89.2] 2018   • QT prolongation [R94.31] 2018      Resolved Hospital Problems    Diagnosis Date Noted Date Resolved   No resolved problems to display.          Hospital Course:  Fartun Amin is a 28 y.o. female With a long-standing history of psychiatric issues, [facility] and [facility] a [facility] appointed guardian who presents to the emergency room with complaints of altered mental status.  Apparently she was found face down on the floor.  Patient is unable to provide any history.  Workup in the emergency room included a drug screen positive forbenzodiazepines, opiates, TCA, phencyclidine, and propoxyphene.  She also told the physicianin the emergency room that she wanted to kill herself.  She has a long history of manipulative behavior and self-mutilation.  She was admitted to the hospitalist service for further evaluation.  Also has improved while in the hospital but is very difficult to assess given her psychiatric history.  They've discharge she was seen by the [facility] she did not  "feel she was appropriate.   has petitioned for involuntary psychiatric assessment and she'll be taken to [facility] for psychiatric evaluation.      Day of Discharge     HPI:   No changes overnight.  She says \"I'm confused\".  Still admits to suicidal ideation.    Review of Systems   Unable to perform ROS: Psychiatric disorder       Vital Signs:   Temp:  [98 °F (36.7 °C)-98.5 °F (36.9 °C)] 98 °F (36.7 °C)  Heart Rate:  [81-97] 87  Resp:  [16-18] 18  BP: ()/(58-70) 108/70     Physical Exam:  Constitutional: No acute distress, sleeping and awakens easily  HENT: NCAT, mucous membranes moist  Respiratory: Clear to auscultation bilaterally, respiratory effort normal   Cardiovascular: RRR, no murmurs, rubs, or gallops  Gastrointestinal: Positive bowel sounds, soft, nontender, nondistended  Musculoskeletal: No bilateral ankle edema  Psychiatric: odd affect, cooperative  Neurologic: more conversant, no focal deficits, also seen ambulating in the gutierrez  Skin: No rashes      Pertinent  and/or Most Recent Results       Results from last 7 days  Lab Units 10/04/18  0715 10/03/18  1528   WBC 10*3/mm3  --  12.12*   HEMOGLOBIN g/dL  --  10.1*   HEMATOCRIT %  --  31.4*   PLATELETS 10*3/mm3  --  281   SODIUM mmol/L 138 138   POTASSIUM mmol/L 3.5 3.9   CHLORIDE mmol/L 105 103   CO2 mmol/L 24.0 26.0   BUN mg/dL 12 15   CREATININE mg/dL 0.53* 0.70   GLUCOSE mg/dL 76 81   CALCIUM mg/dL 7.8* 8.1*       Results from last 7 days  Lab Units 10/03/18  1528   BILIRUBIN mg/dL 0.2*   ALK PHOS U/L 58   ALT (SGPT) U/L 33   AST (SGOT) U/L 28           Invalid input(s): TG, LDLCALC, LDLREALC    Results from last 7 days  Lab Units 10/03/18  1528   TSH mIU/mL 1.168   BNP pg/mL 12.0     Brief Urine Lab Results  (Last result in the past 365 days)      Color   Clarity   Blood   Leuk Est   Nitrite   Protein   CREAT   Urine HCG        10/03/18 1551 Dark Yellow(A) Cloudy(A) Negative Trace(A) Negative 100 mg/dL (2+)(A)         "       Microbiology Results Abnormal     None          Imaging Results (all)     Procedure Component Value Units Date/Time    XR Chest 1 View [[id number]] Collected:  10/03/18 1657     Updated:  10/03/18 1705    Narrative:       EXAMINATION: XR CHEST 1 VW-10/03/2018:      INDICATION: AMS.      COMPARISON: 05/21/2018.     FINDINGS: There is a normal cardiac silhouette. There has been  resolution of pulmonary disease when compared with 05/21/2018. At this  time there is no inflammatory process, mass or effusion.           Impression:       Negative chest x-ray.     D:  10/03/2018  E:  10/03/2018     This report was finalized on 10/3/2018 5:03 PM by Dr. Caden Washington MD.             Results for orders placed during the hospital encounter of 04/23/18   Duplex Venous Lower Extremity - Right CAR    Narrative · No evidence of deep or superficial venous thrombosis in the right lower   extremity.          Results for orders placed during the hospital encounter of 04/23/18   Duplex Venous Lower Extremity - Right CAR    Narrative · No evidence of deep or superficial venous thrombosis in the right lower   extremity.          Results for orders placed during the hospital encounter of 05/01/18   Adult Transthoracic Echo Complete W/ Cont if Necessary Per Protocol    Narrative · Left ventricular systolic function is normal. Estimated EF = 65%.  · Calculated right ventricular systolic pressure from tricuspid   regurgitation is 21 mmHg.            Discharge Details        Discharge Medications      Changes to Medications      Instructions Start Date   ibuprofen 600 MG tablet  Commonly known as:  ADVIL,MOTRIN  What changed:  Another medication with the same name was removed. Continue taking this medication, and follow the directions you see here.   600 mg, Oral, 3 Times Daily         Continue These Medications      Instructions Start Date   acetaminophen 325 MG tablet  Commonly known as:  TYLENOL   650 mg, Oral, Every 6 Hours PRN       albuterol 108 (90 Base) MCG/ACT inhaler  Commonly known as:  PROVENTIL HFA;VENTOLIN HFA   2 puffs, Inhalation, Every 4 Hours PRN      atorvastatin 20 MG tablet  Commonly known as:  LIPITOR   20 mg, Oral, Nightly      benztropine 2 MG tablet  Commonly known as:  COGENTIN   2 mg, Oral, 2 Times Daily      calcium carbonate 500 MG chewable tablet  Commonly known as:  TUMS   1 tablet, Oral, Every 6 Hours PRN      calcium polycarbophil 625 MG tablet  Commonly known as:  FIBERCON   1,250 mg, Oral, 2 Times Daily      chlorproMAZINE 100 MG tablet  Commonly known as:  THORAZINE   200 mg, Oral, 3 Times Daily      clonazePAM 0.5 MG tablet  Commonly known as:  KlonoPIN   0.5 mg, Oral, 3 Times Daily PRN      divalproex 250 MG DR tablet  Commonly known as:  DEPAKOTE   250 mg, Oral, Every Morning      divalproex 500 MG 24 hr tablet  Commonly known as:  DEPAKOTE   1,500 mg, Oral, Nightly      EPIPEN 2-RUTHIE 0.3 MG/0.3ML solution auto-injector injection  Generic drug:  EPINEPHrine   As Needed      escitalopram 10 MG tablet  Commonly known as:  LEXAPRO   10 mg, Oral, Every Morning      FLUoxetine 20 MG capsule  Commonly known as:  PROzac   20 mg, Oral, Daily      fluticasone 27.5 MCG/SPRAY nasal spray  Commonly known as:  VERAMYST   2 sprays, Nasal, Daily      folic acid 1 MG tablet  Commonly known as:  FOLVITE   1 mg, Oral, Daily      HALLS COUGH DROPS 5.8 MG lozenge  Generic drug:  Menthol   1 lozenge, Mouth/Throat, As Needed      haloperidol 5 MG tablet  Commonly known as:  HALDOL   5 mg, Oral, Daily PRN      hydrocortisone 1 % cream   Topical, Every 12 Hours Scheduled      LORazepam 1 MG tablet  Commonly known as:  ATIVAN   1 mg, Oral, Every 6 Hours PRN      magnesium oxide 400 (241.3 Mg) MG tablet tablet  Commonly known as:  MAGOX   800 mg, Oral, Daily      montelukast 10 MG tablet  Commonly known as:  SINGULAIR   10 mg, Oral, Nightly      naltrexone 50 MG tablet  Commonly known as:  DEPADE   50 mg, Oral, Daily       neomycin-bacitracin-polymyxin 5-400-5000 ointment   Topical, As Needed      OLANZapine 20 MG tablet  Commonly known as:  zyPREXA   20 mg, Oral, Nightly      ondansetron 4 MG tablet  Commonly known as:  ZOFRAN   4 mg, Oral, Every 6 Hours PRN      pantoprazole 40 MG EC tablet  Commonly known as:  PROTONIX   40 mg, Oral, Daily      PEG 3350/ELECTROLYTES PO   240 mL, Oral, Every 30 Minutes PRN      polyethylene glycol packet  Commonly known as:  MIRALAX   17 g, Oral, Daily      PROCTOSOL HC 2.5 % rectal cream  Generic drug:  hydrocortisone   1 application, Rectal, 2 Times Daily PRN      promethazine 25 MG tablet  Commonly known as:  PHENERGAN   25 mg, Oral, Every 6 Hours PRN      propranolol 20 MG tablet  Commonly known as:  INDERAL   20 mg, Oral, 3 Times Daily      raNITIdine 150 MG tablet  Commonly known as:  ZANTAC   150 mg, Oral, Nightly      sodium chloride 0.65 % nasal spray  Commonly known as:  OCEAN   2 sprays, Nasal, 2 Times Daily      SPRINTEC 28 PO   1 tablet, Oral, Daily      traZODone 50 MG tablet  Commonly known as:  DESYREL   50 mg, Oral, Nightly      triamcinolone 0.025 % cream  Commonly known as:  KENALOG   1 application, Topical, 2 Times Daily PRN         Stop These Medications    GUAIFENESIN PO     lisinopril 10 MG tablet  Commonly known as:  PRINIVIL,ZESTRIL              Discharge Disposition:  Psychiatric Hospital or Unit (DC - External)    Discharge Diet: As tolerated    Discharge Activity: As tolerated        Code Status/Level of Support:  Code Status and Medical Interventions:   Ordered at: 10/03/18 2041     Code Status:    CPR     Medical Interventions (Level of Support Prior to Arrest):    Full       No future appointments.        Time Spent on Discharge:  35 minutes    Electronically signed by Niles Cheatham MD, 10/05/18, 12:51 PM.

## 2018-10-05 NOTE — PROGRESS NOTES
Case Management Discharge Note    Final Note: Pt. was assessed by [facility] Mobile Assessment Team this morning.  Pt. was not accepted for treatment at [facility] and the recommendation was to seek involuntary petition to [facility].  Pt. is medically clear for discharge.  GARRICK present the petition for involuntary hospitalization to [place], Judge Denny and the petition was granted.  Transportation will be provided by the [facility].  GARRICK faxed the discharge summary, H&P, facesheet, and signed petition to [facility] at [phone].  RN and Hospitalist have both provided verbal report to [facility] admissions staff.  A copy of the petition was placed on pt's chartlet.  GARRICK has notified Breanna with [facility] of pt's discharge, plan for evaluation by [facility], and D/C summary was faxed to [phone].  GARRICK has also notified pt's state guardian, Clair Reynaldo of plan.    Destination     No service has been selected for the patient.      Durable Medical Equipment     No service has been selected for the patient.      Dialysis/Infusion     No service has been selected for the patient.      Home Medical Care     No service has been selected for the patient.      Social Care     No service has been selected for the patient.        Other: Other ([facility])

## 2018-10-05 NOTE — PLAN OF CARE
Problem: Patient Care Overview  Goal: Plan of Care Review   10/05/18 0620   Coping/Psychosocial   Plan of Care Reviewed With patient   Plan of Care Review   Progress no change   OTHER   Outcome Summary Pt stable. No mention of any self harm or SI. No hallucinations  Cheerful, very chatty. Ambulated in gutierrez, appeared unsteady using handrails until she noticed a floral arrangement she wanted to see and she practically ran towards it. When returning to her room, she reverted to unsteadiness and using handrails. Only c/o mild pain in right arm. Slept from 11p-6:30 am without waking.        Problem: Fall Risk (Adult)  Goal: Absence of Fall  Outcome: Ongoing (interventions implemented as appropriate)   10/05/18 0620   Fall Risk (Adult)   Absence of Fall achieves outcome       Problem: Suicide Risk (Adult)  Goal: Identify Related Risk Factors and Signs and Symptoms   10/05/18 0620   Suicide Risk (Adult)   Related Risk Factors (Suicide Risk) previous suicide attempt;co-occurring disorders;lack of support system;substance use   Signs and Symptoms (Suicide Risk) loss of control

## 2018-10-05 NOTE — PROGRESS NOTES
Continued Stay Note  [facility]     Patient Name: Fartun Amin  MRN: [id number]  Today's Date: 10/5/2018    Admit Date: 10/3/2018          Discharge Plan     Row Name 10/05/18 0803       Plan    Plan SW contacted Guardian and [facility]    Patient/Family in Agreement with Plan unable to assess    Plan Comments SW has called and left messages for pt's guardian, Clair Jacobs, and the [facility] group home RN, Breanna ([phone]) this morning.  GARRICK requested call back to discuss discharge planning.  GARRICK also informed them that pt. has been independent with ambulating, bed mobility, and meals.  Observations of this SW and from notes from other staff members, pt seems to be in a pleasant mood.  She doesn't make any reports of depressed mood or suidcidality unless directly asked and then she states she wants to kill herself.  No specific plan is reported.    If pt. Is medically clear for discharge today, GARRICK will contact the [facility] for a mobile assessment to assist with discharge planning for psychiatric needs.              Discharge Codes    No documentation.           LESLY Queen

## 2018-10-05 NOTE — PROGRESS NOTES
Discharge Planning Assessment  [facility]     Patient Name: Fartun Amin  MRN: [id number]  Today's Date: 10/5/2018    Admit Date: 10/3/2018          Discharge Needs Assessment    No documentation.             Discharge Plan     Row Name 10/05/18 0817       Plan    Plan [facility] MAT has been requested    Plan Comments GARRICK spoke with Mala in Admissions at [facility] ([phone]) and the Mobile Assessment Team (MAT) was requested.  Mala stated she will need to speak to pt's state guardian to obtain consent for the assessment.  GARRICK [facility] her with Clair [facility]  Mala has contacted pt's guardian and stated she obtain verbal consent to evaluate and treat pt.  Mala stated she will present to the hospital this morning to conduct clinical interview with pt.  GARRICK spoke to GRISEL Carlos at [facility] and she is aware of the plan and potential for pt's return to the group home today.  GARRICK has updated hospitalist.     Row Name 10/05/18 0803       Plan    Plan GARRICK contacted Guardian and [facility]    Patient/Family in Agreement with Plan unable to assess    Plan Comments GARRICK has called and left messages for pt's guardian, Clair Jacobs, and the [facility] RN, Breanna ([phone]) this morning.  GARRICK requested call back to discuss discharge planning.  GARRICK also informed them that pt. has been independent with ambulating, bed mobility, and meals.  Observations of this SW and from notes from other staff members, pt seems to be in a pleasant mood.  She doesn't make any reports of depressed mood or suidcidality unless directly asked and then she states she wants to kill herself.  No specific plan is reported.        Destination     No service coordination in this encounter.      Durable Medical Equipment     No service coordination in this encounter.      Dialysis/Infusion     No service coordination in this encounter.      Home Medical Care     No service coordination in this encounter.       Social Care     No service coordination in this encounter.        Expected Discharge Date and Time     Expected Discharge Date Expected Discharge Time    Oct 8, 2018               Demographic Summary    No documentation.           Functional Status    No documentation.           Psychosocial    No documentation.           Abuse/Neglect    No documentation.           Legal    No documentation.           Substance Abuse    No documentation.           Patient Forms    No documentation.         LESLY Queen

## 2018-10-05 NOTE — DISCHARGE PLACEMENT REQUEST
"Fartun Amin  (28 y.o. Female)         Date of Birth Social Security Number Address Home Phone MRN    1990  [address] [facility] [address] [phone] [id number]    Druze Marital Status          None Single       Admission Date Admission Type Admitting Provider Attending Provider Department, Room/Bed    10/3/18 Emergency Niles Cheatham MD  [address], S570/1    Discharge Date Discharge Disposition Discharge Destination        10/5/2018 Psychiatric Hospital or Unit (DC - External)              Attending Provider:  (none)   Allergies:  Penicillins, Sulfa Antibiotics    Isolation:  None   Infection:  None   Code Status:  CPR    Ht:  157.5 cm (62\")   Wt:  80 kg (176 lb 6.4 oz)    Admission Cmt:  None   Principal Problem:  Altered mental status [R41.82]                 Active Insurance as of 10/3/2018     Primary Coverage     Payor Plan Insurance Group Employer/Plan Group    [place]      Payor Plan Address Payor Plan Phone Number Effective From Effective To    [address] [phone] 10/5/2016     [facility] [id number]       Subscriber Name Subscriber Birth Date Member ID       FARTUN AMIN 1990 [id number]                 Emergency Contacts      (Rel.) Home Phone Work Phone Mobile Phone    [place] Guardian (Guardian) -- -- [phone]               Discharge Summary      Niles Cheatham MD at 10/5/2018 12:51 PM              [facility] Medicine Services  DISCHARGE SUMMARY    Patient Name: Fartun Amin  : 1990  MRN: [id number]    Date of Admission: 10/3/2018  Date of Discharge:  10/05/18  Primary Care Physician: Jaclyn Jett APRN    Consults     No orders found from 2018 to 10/4/2018.        Hospital Course     Presenting Problem:   Altered mental status [R41.82]    Active Hospital Problems    Diagnosis Date Noted   • **Altered mental status [R41.82] 2018     Priority: Medium " "  • Suicide ideation [R45.851] 10/03/2018     Priority: Medium   • Polypharmacy [Z79.899] 10/03/2018   • HTN (hypertension) [I10] 04/23/2018   • Adjustment disorder [F43.20] 04/23/2018   • Personality disorder (CMS/HCC) [F60.9] 04/23/2018   • PTSD (post-traumatic stress disorder) [F43.10] 04/23/2018   • Intermittent explosive disorder [F63.81] 04/23/2018   • Asthma [J45.909] 04/23/2018   • Abnormal drug screen [R89.2] 04/23/2018   • QT prolongation [R94.31] 04/23/2018      Resolved Hospital Problems    Diagnosis Date Noted Date Resolved   No resolved problems to display.          Hospital Course:  Fartun Amin is a 28 y.o. female With a long-standing history of psychiatric issues, [facility] and has a state appointed guardian who presents to the emergency room with complaints of altered mental status.  Apparently she was found face down on the floor.  Patient is unable to provide any history.  Workup in the emergency room included a drug screen positive forbenzodiazepines, opiates, TCA, phencyclidine, and propoxyphene.  She also told the physicianin the emergency room that she wanted to kill herself.  She has a long history of manipulative behavior and self-mutilation.  She was admitted to the hospitalist service for further evaluation.  Also has improved while in the hospital but is very difficult to assess given her psychiatric history.  They've discharge [facility] she did not feel she was appropriate.   has petitioned for involuntary psychiatric assessment and she'll be taken to [facility] for psychiatric evaluation.      Day of Discharge     HPI:   No changes overnight.  She says \"I'm confused\".  Still admits to suicidal ideation.    Review of Systems   Unable to perform ROS: Psychiatric disorder       Vital Signs:   Temp:  [98 °F (36.7 °C)-98.5 °F (36.9 °C)] 98 °F (36.7 °C)  Heart Rate:  [81-97] 87  Resp:  [16-18] 18  BP: ()/(58-70) 108/70 "     Physical Exam:  Constitutional: No acute distress, sleeping and awakens easily  HENT: NCAT, mucous membranes moist  Respiratory: Clear to auscultation bilaterally, respiratory effort normal   Cardiovascular: RRR, no murmurs, rubs, or gallops  Gastrointestinal: Positive bowel sounds, soft, nontender, nondistended  Musculoskeletal: No bilateral ankle edema  Psychiatric: odd affect, cooperative  Neurologic: more conversant, no focal deficits, also seen ambulating in the guteirrez  Skin: No rashes      Pertinent  and/or Most Recent Results       Results from last 7 days  Lab Units 10/04/18  0715 10/03/18  1528   WBC 10*3/mm3  --  12.12*   HEMOGLOBIN g/dL  --  10.1*   HEMATOCRIT %  --  31.4*   PLATELETS 10*3/mm3  --  281   SODIUM mmol/L 138 138   POTASSIUM mmol/L 3.5 3.9   CHLORIDE mmol/L 105 103   CO2 mmol/L 24.0 26.0   BUN mg/dL 12 15   CREATININE mg/dL 0.53* 0.70   GLUCOSE mg/dL 76 81   CALCIUM mg/dL 7.8* 8.1*       Results from last 7 days  Lab Units 10/03/18  1528   BILIRUBIN mg/dL 0.2*   ALK PHOS U/L 58   ALT (SGPT) U/L 33   AST (SGOT) U/L 28           Invalid input(s): TG, LDLCALC, LDLREALC    Results from last 7 days  Lab Units 10/03/18  1528   TSH mIU/mL 1.168   BNP pg/mL 12.0     Brief Urine Lab Results  (Last result in the past 365 days)      Color   Clarity   Blood   Leuk Est   Nitrite   Protein   CREAT   Urine HCG        10/03/18 1551 Dark Yellow(A) Cloudy(A) Negative Trace(A) Negative 100 mg/dL (2+)(A)               Microbiology Results Abnormal     None          Imaging Results (all)     Procedure Component Value Units Date/Time    XR Chest 1 View [[id number]] Collected:  10/03/18 1657     Updated:  10/03/18 1705    Narrative:       EXAMINATION: XR CHEST 1 VW-10/03/2018:      INDICATION: AMS.      COMPARISON: 05/21/2018.     FINDINGS: There is a normal cardiac silhouette. There has been  resolution of pulmonary disease when compared with 05/21/2018. At this  time there is no inflammatory process, mass or  effusion.           Impression:       Negative chest x-ray.     D:  10/03/2018  E:  10/03/2018     This report was finalized on 10/3/2018 5:03 PM by Dr. Caden Washington MD.             Results for orders placed during the hospital encounter of 04/23/18   Duplex Venous Lower Extremity - Right CAR    Narrative · No evidence of deep or superficial venous thrombosis in the right lower   extremity.          Results for orders placed during the hospital encounter of 04/23/18   Duplex Venous Lower Extremity - Right CAR    Narrative · No evidence of deep or superficial venous thrombosis in the right lower   extremity.          Results for orders placed during the hospital encounter of 05/01/18   Adult Transthoracic Echo Complete W/ Cont if Necessary Per Protocol    Narrative · Left ventricular systolic function is normal. Estimated EF = 65%.  · Calculated right ventricular systolic pressure from tricuspid   regurgitation is 21 mmHg.            Discharge Details        Discharge Medications      Changes to Medications      Instructions Start Date   ibuprofen 600 MG tablet  Commonly known as:  ADVIL,MOTRIN  What changed:  Another medication with the same name was removed. Continue taking this medication, and follow the directions you see here.   600 mg, Oral, 3 Times Daily         Continue These Medications      Instructions Start Date   acetaminophen 325 MG tablet  Commonly known as:  TYLENOL   650 mg, Oral, Every 6 Hours PRN      albuterol 108 (90 Base) MCG/ACT inhaler  Commonly known as:  PROVENTIL HFA;VENTOLIN HFA   2 puffs, Inhalation, Every 4 Hours PRN      atorvastatin 20 MG tablet  Commonly known as:  LIPITOR   20 mg, Oral, Nightly      benztropine 2 MG tablet  Commonly known as:  COGENTIN   2 mg, Oral, 2 Times Daily      calcium carbonate 500 MG chewable tablet  Commonly known as:  TUMS   1 tablet, Oral, Every 6 Hours PRN      calcium polycarbophil 625 MG tablet  Commonly known as:  FIBERCON   1,250 mg, Oral, 2 Times  Daily      chlorproMAZINE 100 MG tablet  Commonly known as:  THORAZINE   200 mg, Oral, 3 Times Daily      clonazePAM 0.5 MG tablet  Commonly known as:  KlonoPIN   0.5 mg, Oral, 3 Times Daily PRN      divalproex 250 MG DR tablet  Commonly known as:  DEPAKOTE   250 mg, Oral, Every Morning      divalproex 500 MG 24 hr tablet  Commonly known as:  DEPAKOTE   1,500 mg, Oral, Nightly      EPIPEN 2-RUTHIE 0.3 MG/0.3ML solution auto-injector injection  Generic drug:  EPINEPHrine   As Needed      escitalopram 10 MG tablet  Commonly known as:  LEXAPRO   10 mg, Oral, Every Morning      FLUoxetine 20 MG capsule  Commonly known as:  PROzac   20 mg, Oral, Daily      fluticasone 27.5 MCG/SPRAY nasal spray  Commonly known as:  VERAMYST   2 sprays, Nasal, Daily      folic acid 1 MG tablet  Commonly known as:  FOLVITE   1 mg, Oral, Daily      HALLS COUGH DROPS 5.8 MG lozenge  Generic drug:  Menthol   1 lozenge, Mouth/Throat, As Needed      haloperidol 5 MG tablet  Commonly known as:  HALDOL   5 mg, Oral, Daily PRN      hydrocortisone 1 % cream   Topical, Every 12 Hours Scheduled      LORazepam 1 MG tablet  Commonly known as:  ATIVAN   1 mg, Oral, Every 6 Hours PRN      magnesium oxide 400 (241.3 Mg) MG tablet tablet  Commonly known as:  MAGOX   800 mg, Oral, Daily      montelukast 10 MG tablet  Commonly known as:  SINGULAIR   10 mg, Oral, Nightly      naltrexone 50 MG tablet  Commonly known as:  DEPADE   50 mg, Oral, Daily      neomycin-bacitracin-polymyxin 5-400-5000 ointment   Topical, As Needed      OLANZapine 20 MG tablet  Commonly known as:  zyPREXA   20 mg, Oral, Nightly      ondansetron 4 MG tablet  Commonly known as:  ZOFRAN   4 mg, Oral, Every 6 Hours PRN      pantoprazole 40 MG EC tablet  Commonly known as:  PROTONIX   40 mg, Oral, Daily      PEG 3350/ELECTROLYTES PO   240 mL, Oral, Every 30 Minutes PRN      polyethylene glycol packet  Commonly known as:  MIRALAX   17 g, Oral, Daily      PROCTOSOL HC 2.5 % rectal cream  Generic  drug:  hydrocortisone   1 application, Rectal, 2 Times Daily PRN      promethazine 25 MG tablet  Commonly known as:  PHENERGAN   25 mg, Oral, Every 6 Hours PRN      propranolol 20 MG tablet  Commonly known as:  INDERAL   20 mg, Oral, 3 Times Daily      raNITIdine 150 MG tablet  Commonly known as:  ZANTAC   150 mg, Oral, Nightly      sodium chloride 0.65 % nasal spray  Commonly known as:  OCEAN   2 sprays, Nasal, 2 Times Daily      SPRINTEC 28 PO   1 tablet, Oral, Daily      traZODone 50 MG tablet  Commonly known as:  DESYREL   50 mg, Oral, Nightly      triamcinolone 0.025 % cream  Commonly known as:  KENALOG   1 application, Topical, 2 Times Daily PRN         Stop These Medications    GUAIFENESIN PO     lisinopril 10 MG tablet  Commonly known as:  PRINIVILZESTRIL              Discharge Disposition:  Psychiatric Hospital or Unit (DC - External)    Discharge Diet: As tolerated    Discharge Activity: As tolerated        Code Status/Level of Support:  Code Status and Medical Interventions:   Ordered at: 10/03/18 2041     Code Status:    CPR     Medical Interventions (Level of Support Prior to Arrest):    Full       No future appointments.        Time Spent on Discharge:  35 minutes    Electronically signed by Niles Cheatham MD, 10/05/18, 12:51 PM.        Electronically signed by Niles Cheatham MD at 10/5/2018  1:18 PM